# Patient Record
Sex: FEMALE | Race: BLACK OR AFRICAN AMERICAN | Employment: FULL TIME | URBAN - METROPOLITAN AREA
[De-identification: names, ages, dates, MRNs, and addresses within clinical notes are randomized per-mention and may not be internally consistent; named-entity substitution may affect disease eponyms.]

---

## 2017-01-31 ENCOUNTER — HOSPITAL ENCOUNTER (EMERGENCY)
Facility: CLINIC | Age: 27
Discharge: HOME | End: 2017-01-31
Attending: EMERGENCY MEDICINE | Admitting: EMERGENCY MEDICINE
Payer: COMMERCIAL

## 2017-01-31 ENCOUNTER — TELEPHONE (OUTPATIENT)
Dept: FAMILY MEDICINE | Facility: CLINIC | Age: 27
End: 2017-01-31

## 2017-01-31 ENCOUNTER — APPOINTMENT (OUTPATIENT)
Dept: CT IMAGING | Facility: CLINIC | Age: 27
End: 2017-01-31
Attending: EMERGENCY MEDICINE
Payer: COMMERCIAL

## 2017-01-31 VITALS
RESPIRATION RATE: 20 BRPM | TEMPERATURE: 98.6 F | OXYGEN SATURATION: 100 % | SYSTOLIC BLOOD PRESSURE: 131 MMHG | HEART RATE: 87 BPM | BODY MASS INDEX: 36.75 KG/M2 | DIASTOLIC BLOOD PRESSURE: 83 MMHG | WEIGHT: 201 LBS

## 2017-01-31 DIAGNOSIS — R10.84 ABDOMINAL PAIN, GENERALIZED: ICD-10-CM

## 2017-01-31 LAB
ALBUMIN SERPL-MCNC: 3.6 G/DL (ref 3.4–5)
ALBUMIN UR-MCNC: NEGATIVE MG/DL
ALP SERPL-CCNC: 91 U/L (ref 40–150)
ALT SERPL W P-5'-P-CCNC: 58 U/L (ref 0–50)
ANION GAP SERPL CALCULATED.3IONS-SCNC: 6 MMOL/L (ref 3–14)
APPEARANCE UR: CLEAR
AST SERPL W P-5'-P-CCNC: 20 U/L (ref 0–45)
BASOPHILS # BLD AUTO: 0 10E9/L (ref 0–0.2)
BASOPHILS NFR BLD AUTO: 0.3 %
BILIRUB SERPL-MCNC: 0.4 MG/DL (ref 0.2–1.3)
BILIRUB UR QL STRIP: NEGATIVE
BUN SERPL-MCNC: 9 MG/DL (ref 7–30)
CALCIUM SERPL-MCNC: 8.6 MG/DL (ref 8.5–10.1)
CHLORIDE SERPL-SCNC: 107 MMOL/L (ref 94–109)
CO2 SERPL-SCNC: 26 MMOL/L (ref 20–32)
COLOR UR AUTO: ABNORMAL
CREAT BLD-MCNC: 0.6 MG/DL (ref 0.52–1.04)
CREAT SERPL-MCNC: 0.66 MG/DL (ref 0.52–1.04)
DIFFERENTIAL METHOD BLD: NORMAL
EOSINOPHIL # BLD AUTO: 0.3 10E9/L (ref 0–0.7)
EOSINOPHIL NFR BLD AUTO: 2.8 %
ERYTHROCYTE [DISTWIDTH] IN BLOOD BY AUTOMATED COUNT: 13.1 % (ref 10–15)
GFR SERPL CREATININE-BSD FRML MDRD: >90 ML/MIN/1.7M2
GFR SERPL CREATININE-BSD FRML MDRD: ABNORMAL ML/MIN/1.7M2
GLUCOSE SERPL-MCNC: 80 MG/DL (ref 70–99)
GLUCOSE UR STRIP-MCNC: NEGATIVE MG/DL
HCG UR QL: NORMAL
HCT VFR BLD AUTO: 41.8 % (ref 35–47)
HGB BLD-MCNC: 14.3 G/DL (ref 11.7–15.7)
HGB UR QL STRIP: NEGATIVE
IMM GRANULOCYTES # BLD: 0 10E9/L (ref 0–0.4)
IMM GRANULOCYTES NFR BLD: 0.2 %
INTERNAL QC OK POCT: YES
KETONES UR STRIP-MCNC: NEGATIVE MG/DL
LEUKOCYTE ESTERASE UR QL STRIP: ABNORMAL
LIPASE SERPL-CCNC: 123 U/L (ref 73–393)
LYMPHOCYTES # BLD AUTO: 3.8 10E9/L (ref 0.8–5.3)
LYMPHOCYTES NFR BLD AUTO: 35.5 %
MCH RBC QN AUTO: 31.4 PG (ref 26.5–33)
MCHC RBC AUTO-ENTMCNC: 34.2 G/DL (ref 31.5–36.5)
MCV RBC AUTO: 92 FL (ref 78–100)
MONOCYTES # BLD AUTO: 0.4 10E9/L (ref 0–1.3)
MONOCYTES NFR BLD AUTO: 4.1 %
MUCOUS THREADS #/AREA URNS LPF: PRESENT /LPF
NEUTROPHILS # BLD AUTO: 6 10E9/L (ref 1.6–8.3)
NEUTROPHILS NFR BLD AUTO: 57.1 %
NITRATE UR QL: NEGATIVE
NRBC # BLD AUTO: 0 10*3/UL
NRBC BLD AUTO-RTO: 0 /100
PH UR STRIP: 6.5 PH (ref 5–7)
PLATELET # BLD AUTO: 253 10E9/L (ref 150–450)
POTASSIUM SERPL-SCNC: 3.6 MMOL/L (ref 3.4–5.3)
PROT SERPL-MCNC: 7.1 G/DL (ref 6.8–8.8)
RBC # BLD AUTO: 4.55 10E12/L (ref 3.8–5.2)
RBC #/AREA URNS AUTO: <1 /HPF (ref 0–2)
SODIUM SERPL-SCNC: 139 MMOL/L (ref 133–144)
SP GR UR STRIP: 1 (ref 1–1.03)
SQUAMOUS #/AREA URNS AUTO: 1 /HPF (ref 0–1)
URN SPEC COLLECT METH UR: ABNORMAL
UROBILINOGEN UR STRIP-MCNC: NORMAL MG/DL (ref 0–2)
WBC # BLD AUTO: 10.6 10E9/L (ref 4–11)
WBC #/AREA URNS AUTO: 1 /HPF (ref 0–2)

## 2017-01-31 PROCEDURE — 85025 COMPLETE CBC W/AUTO DIFF WBC: CPT | Performed by: EMERGENCY MEDICINE

## 2017-01-31 PROCEDURE — 99284 EMERGENCY DEPT VISIT MOD MDM: CPT | Mod: Z6 | Performed by: EMERGENCY MEDICINE

## 2017-01-31 PROCEDURE — 82565 ASSAY OF CREATININE: CPT

## 2017-01-31 PROCEDURE — 99285 EMERGENCY DEPT VISIT HI MDM: CPT | Mod: 25 | Performed by: EMERGENCY MEDICINE

## 2017-01-31 PROCEDURE — 25500064 ZZH RX 255 OP 636: Performed by: EMERGENCY MEDICINE

## 2017-01-31 PROCEDURE — 80053 COMPREHEN METABOLIC PANEL: CPT | Performed by: EMERGENCY MEDICINE

## 2017-01-31 PROCEDURE — 83690 ASSAY OF LIPASE: CPT | Performed by: EMERGENCY MEDICINE

## 2017-01-31 PROCEDURE — 25000125 ZZHC RX 250: Performed by: EMERGENCY MEDICINE

## 2017-01-31 PROCEDURE — 81001 URINALYSIS AUTO W/SCOPE: CPT | Performed by: EMERGENCY MEDICINE

## 2017-01-31 PROCEDURE — 81025 URINE PREGNANCY TEST: CPT | Performed by: EMERGENCY MEDICINE

## 2017-01-31 PROCEDURE — 74177 CT ABD & PELVIS W/CONTRAST: CPT

## 2017-01-31 RX ORDER — IOPAMIDOL 755 MG/ML
100 INJECTION, SOLUTION INTRAVASCULAR ONCE
Status: COMPLETED | OUTPATIENT
Start: 2017-01-31 | End: 2017-01-31

## 2017-01-31 RX ADMIN — IOPAMIDOL 100 ML: 755 INJECTION, SOLUTION INTRAVENOUS at 21:42

## 2017-01-31 RX ADMIN — SODIUM CHLORIDE 98 ML: 9 INJECTION, SOLUTION INTRAVENOUS at 21:42

## 2017-01-31 ASSESSMENT — ENCOUNTER SYMPTOMS
COLOR CHANGE: 0
ADENOPATHY: 0
AGITATION: 0
CONSTIPATION: 0
DIFFICULTY URINATING: 0
CHILLS: 0
NECK PAIN: 0
DIARRHEA: 0
FEVER: 0
BACK PAIN: 0
SHORTNESS OF BREATH: 0
ABDOMINAL PAIN: 1
NECK STIFFNESS: 0
BLOOD IN STOOL: 0
BRUISES/BLEEDS EASILY: 0
DYSURIA: 0
LIGHT-HEADEDNESS: 0
NAUSEA: 0
POLYDIPSIA: 0
VOMITING: 0

## 2017-01-31 NOTE — ED AVS SNAPSHOT
[facility], Emergency Department    [address]    [place] [address]    Phone:  [phone]    Fax:  [phone]                                       Michelle Joshua   MRN: [id number]    Department:  [facility], Emergency Department   Date of Visit:  1/31/2017           Patient Information     Date Of Birth          1990        Your diagnoses for this visit were:     Abdominal pain, generalized        You were seen by Cecilia Kenyon MD.        Discharge Instructions       Please make an appointment to follow up with Your Primary Care Provider in 2 days unless symptoms completely resolve.    *Abdominal Pain, Unknown Cause (Female)    The exact cause of your abdominal (stomach) pain is not certain. This does not mean that this is something to worry about, or the right tests were not done. Everyone likes to know the exact cause of the problem, but sometimes with abdominal pain, there is no clear-cut cause, and this could be a good thing. The good news is that your symptoms can be treated, and you will feel better.   Your condition does not seem serious now; however, sometimes the signs of a serious problem may take more time to appear. For this reason, it is important for you to watch for any new symptoms, problems, or worsening of your condition.  Over the next few days, the abdominal pain may come and go, or be continuous. Other common symptoms can include nausea and vomiting. Sometimes it can be difficult to tell if you feel nauseous, you may just feel bad and not associate that feeling with nausea. Constipation, diarrhea, and a fever may go along with the pain.  The pain may continue even if treated correctly over the following days. Depending on how things go, sometimes the cause can become clear and may require further or different treatment. Additional evaluations, medications, or tests may be needed.  Home care  Your health care provider may prescribe medications for pain, symptoms,  or an infection.  Follow the health care provider's instructions for taking these medications.  General care    Rest until your next exam. No strenuous activities.    Try to find positions that ease discomfort. A small pillow placed on the abdomen may help relieve pain.    Something warm on your abdomen (such as a heating pad) may help, but be careful not to burn yourself.  Diet    Do not force yourself to eat, especially if having cramps, vomiting, or diarrhea.    Water is important so you do not get dehydrated. Soup may also be good. Sports drinks may also help, especially if they are not too acidic. Make sure you don't drink sugary drinks as this can make things worse. Take liquids in small amounts. Do not guzzle them.    Caffeine sometimes makes the pain and cramping worse.    Avoid dairy products if you have vomiting or diarrhea.    Don't eat large amounts at a time. Wait a few minutes between bites.    Eat a diet low in fiber (called a low-residue diet). Foods allowed include refined breads, white rice, fruit and vegetable juices without pulp, tender meats. These foods will pass more easily through the intestine.    Avoid fried or fatty foods, dairy, alcohol and spicy foods until your symptoms go away.  Follow-up care  Follow up with your health care provider as instructed, or if your pain does not begin to improve in the next 24 hours.  When to seek medical care  Seek prompt medical care if any of the following occur:    Pain gets worse or moves to the right lower abdomen    New or worsening vomiting or diarrhea    Swelling of the abdomen    Unable to pass stool for more than three days    New fever over 101  F (38.3 C), or rising fever    Blood in vomit or bowel movements (dark red or black color)    Jaundice (yellow color of eyes and skin)    Weakness, dizziness    Chest, arm, back, neck or jaw pain    Unexpected vaginal bleeding or missed period  Call 911  Call emergency services if any of the following  occur:    Trouble breathing    Confusion    Fainting or loss of consciousness    Rapid heart rate    Seizure    1218-5689 [facility], [address] [facility], [place] [address]. All rights reserved. This information is not intended as a substitute for professional medical care. Always follow your healthcare professional's instructions.            Future Appointments        Provider Department Dept Phone Center    2/2/2017 1:20 PM Bimal Pearl PA-C [facility] [phone]       24 Hour Appointment Hotline       To make an appointment at any [facility], call [phone] ([phone]). If you don't have a family doctor or clinic, we will help you find one. [place] clinics are conveniently located to serve the needs of you and your family.             Review of your medicines      Our records show that you are taking the medicines listed below. If these are incorrect, please call your family doctor or clinic.        Dose / Directions Last dose taken    albuterol 108 (90 BASE) MCG/ACT Inhaler   Commonly known as:  PROAIR HFA/PROVENTIL HFA/VENTOLIN HFA   Dose:  2 puff   Quantity:  1 Inhaler        Inhale 2 puffs into the lungs every 6 hours as needed for shortness of breath / dyspnea   Refills:  5        azithromycin 250 MG tablet   Commonly known as:  ZITHROMAX   Quantity:  6 tablet        Two tablets first day, then one tablet daily for four days.   Refills:  0        cyclobenzaprine 5 MG tablet   Commonly known as:  FLEXERIL   Dose:  5 mg   Quantity:  42 tablet        Take 1 tablet (5 mg) by mouth 3 times daily as needed for muscle spasms   Refills:  1        docusate sodium 100 MG tablet   Commonly known as:  COLACE   Dose:  100 mg   Quantity:  60 tablet        Take 100 mg by mouth daily   Refills:  3        Flunisolide HFA 80 MCG/ACT Aers   Dose:  2 puff   Quantity:  1 Inhaler        Inhale 2 puffs into the lungs 2 times daily   Refills:  prn        guaiFENesin-codeine 100-10  MG/5ML Soln solution   Commonly known as:  ROBITUSSIN AC   Dose:  1 tsp.   Quantity:  120 mL        Take 5 mLs by mouth every 4 hours as needed for cough   Refills:  0        guaiFENesin-dextromethorphan 100-10 MG/5ML syrup   Commonly known as:  ROBITUSSIN DM   Dose:  5 mL   Quantity:  560 mL        Take 5 mLs by mouth every 4 hours as needed for cough   Refills:  0        loratadine 10 MG tablet   Commonly known as:  CLARITIN   Dose:  10 mg   Quantity:  30 tablet        Take 1 tablet (10 mg) by mouth daily   Refills:  3        order for DME   Quantity:  1 Device        Equipment being ordered: maternity support belt   Refills:  0        * oxyCODONE 5 MG IR tablet   Commonly known as:  ROXICODONE   Dose:  5-10 mg   Quantity:  10 tablet        Take 1-2 tablets (5-10 mg) by mouth every 4 hours as needed for moderate to severe pain   Refills:  0        * oxyCODONE 5 MG IR tablet   Commonly known as:  ROXICODONE   Dose:  5 mg   Quantity:  30 tablet        Take 1 tablet (5 mg) by mouth every 4 hours as needed for pain maximum 4 tablet(s) per day   Refills:  0        prenatal multivitamin  plus iron 27-0.8 MG Tabs per tablet   Dose:  1 tablet   Quantity:  100 tablet        Take 1 tablet by mouth daily   Refills:  3        promethazine 25 MG tablet   Commonly known as:  PHENERGAN   Dose:  25 mg   Quantity:  20 tablet        Take 1 tablet (25 mg) by mouth every 6 hours as needed for nausea   Refills:  1        * Notice:  This list has 2 medication(s) that are the same as other medications prescribed for you. Read the directions carefully, and ask your doctor or other care provider to review them with you.            Procedures and tests performed during your visit     CBC with platelets differential    CT Abdomen Pelvis w Contrast    Comprehensive metabolic panel    Creatinine POCT    ISTAT creatinine  POCT    Lipase    Peripheral IV catheter    UA with Microscopic    hCG qual urine POCT      Orders Needing Specimen  Collection     None      Pending Results     Date and Time Order Name Status Description    1/31/2017 2040 CT Abdomen Pelvis w Contrast Preliminary             Pending Culture Results     No orders found from 1/30/2017 to 2/1/2017.            Thank you for choosing [facility]       Thank you for choosing [facility] for your care. Our goal is always to provide you with excellent care. Hearing back from our patients is one way we can continue to improve our services. Please take a few minutes to complete the written survey that you may receive in the mail after you visit with us. Thank you!        [employer] Information     [employer] gives you secure access to your electronic health record. If you see a primary care provider, you can also send messages to your care team and make appointments. If you have questions, please call your primary care clinic.  If you do not have a primary care provider, please call [phone] and they will assist you.        Care EveryWhere ID     This is your Care EveryWhere ID. This could be used by other organizations to access your [facility] medical records  [id number]        After Visit Summary       This is your record. Keep this with you and show to your community pharmacist(s) and doctor(s) at your next visit.

## 2017-01-31 NOTE — ED AVS SNAPSHOT
[facility], [place], Emergency Department    [address]    [facility] [address]    Phone:  [phone]    Fax:  [phone]                                       Michelle Joshua   MRN: [id number]    Department:  [facility], Emergency Department   Date of Visit:  1/31/2017           After Visit Summary Signature Page     I have received my discharge instructions, and my questions have been answered. I have discussed any challenges I see with this plan with the nurse or doctor.    ..........................................................................................................................................  Patient/Patient Representative Signature      ..........................................................................................................................................  Patient Representative Print Name and Relationship to Patient    ..................................................               ................................................  Date                                            Time    ..........................................................................................................................................  Reviewed by Signature/Title    ...................................................              ..............................................  Date                                                            Time

## 2017-02-01 NOTE — TELEPHONE ENCOUNTER
Disc w/ pt-hard to know what it is so prob UC tonight best bet-call and cancel if she doesn't need the thurs appt

## 2017-02-01 NOTE — TELEPHONE ENCOUNTER
Reason for call:  Patient reporting a symptom    Symptom or request: Abd pain    Duration (how long have symptoms been present): since last night    Have you been treated for this before? Yes    Additional comments: upset because there was no nurse avail to take call right now.. Wondering if she needed to go to     Phone Number patient can be reached at:  Home number on file [phone] (home)    Best Time:  anytime    Can we leave a detailed message on this number:  YES    Call taken on 1/31/2017 at 6:01 PM by Savita Bonilla

## 2017-02-01 NOTE — ED NOTES
Pt says she has a lum\p above belly button and c/o very sharp pain.  Bowel movements have been normal.  Eating has been fine too.  No nausea or vomiting.

## 2017-02-01 NOTE — TELEPHONE ENCOUNTER
MP,  Patient called c/o abd pain.  Pain is to (R) side of abd right next to umbilicus.  Hurts to put pressure on area, lay on stomach, sit down (pants press on this area).  No recent diarrhea or constipation issues. No new foods or medications.  Nauseous; has not vomited.  Afebrile.  Has not tried OTC pain relievers.  Feels a small lump to this area.  Has appt with  Thursday; works until 4pm tomorrow can only come in after that.  Pt wanting to know if she should go to [facility] or if she can wait to see  Thursday.  Please advise.  Yolanda SANDOVAL RN

## 2017-02-01 NOTE — ED PROVIDER NOTES
History     Chief Complaint   Patient presents with     Abdominal Pain     suspects a hernia as feels a not right above umbilicus. can't lay on stomach, walking is painful.     HPI  Michelle Joshua is a 26 year old female complaining of 2 days of midabdominal pain. Pain is dull, occasionally radiating to the left side, mild to moderate, nothing makes it better or worse. No fevers/chills, nausea, vomiting, diarrhea, or previous abdominal surgeries. No chronic medical problems according to the patient. No dysuria. No other concerns or complaints.    This part of the document was transcribed by Sunny Reid, Medical Scribe.     I have reviewed the Medications, Allergies, Past Medical and Surgical History, and Social History in the Epic system.    Review of Systems   Constitutional: Negative for fever and chills.   HENT: Negative for congestion.    Eyes: Negative for visual disturbance.   Respiratory: Negative for shortness of breath.    Cardiovascular: Negative for chest pain.   Gastrointestinal: Positive for abdominal pain. Negative for nausea, vomiting, diarrhea, constipation and blood in stool.   Endocrine: Negative for polydipsia and polyuria.   Genitourinary: Negative for dysuria and difficulty urinating.   Musculoskeletal: Negative for back pain, neck pain and neck stiffness.   Skin: Negative for color change.   Neurological: Negative for light-headedness.   Hematological: Negative for adenopathy. Does not bruise/bleed easily.   Psychiatric/Behavioral: Negative for behavioral problems and agitation.       Physical Exam   BP: 155/70 mmHg  Pulse: 87  Temp: 98.2  F (36.8  C)  Resp: 16  Weight: 91.173 kg (201 lb)  SpO2: 100 %  Physical Exam   Constitutional: She is oriented to person, place, and time. She appears well-developed and well-nourished. No distress.   HENT:   Head: Normocephalic and atraumatic.   Mouth/Throat: Oropharynx is clear and moist. No oropharyngeal exudate.   Eyes: Conjunctivae and EOM are  normal. No scleral icterus.   Neck: Normal range of motion.   Cardiovascular: Normal rate, normal heart sounds and intact distal pulses.    Pulmonary/Chest: Effort normal and breath sounds normal. No respiratory distress. She has no wheezes. She has no rales.   Abdominal: Soft. Bowel sounds are normal. She exhibits no distension. There is tenderness. There is no rebound, no guarding, no CVA tenderness, no tenderness at McBurney's point and negative Joe's sign.   Midabdominal tenderness to palpation without any obvious mass.   Musculoskeletal: Normal range of motion. She exhibits no edema or tenderness.   Neurological: She is alert and oriented to person, place, and time. No cranial nerve deficit. She exhibits normal muscle tone. Coordination normal.   Skin: Skin is warm. No rash noted. She is not diaphoretic.   Psychiatric: She has a normal mood and affect. Her behavior is normal. Judgment and thought content normal.   Nursing note and vitals reviewed.      ED Course     Procedures             Critical Care time:  none               Labs Ordered and Resulted from Time of ED Arrival Up to the Time of Departure from the ED   COMPREHENSIVE METABOLIC PANEL - Abnormal; Notable for the following:     ALT 58 (*)     All other components within normal limits   ROUTINE UA WITH MICROSCOPIC - Abnormal; Notable for the following:     Leukocyte Esterase Urine Small (*)     Mucous Urine Present (*)     All other components within normal limits   CBC WITH PLATELETS DIFFERENTIAL   LIPASE   CREATININE POCT   PERIPHERAL IV CATHETER   ISTAT CREATININE NURSING POCT       Assessments & Plan (with Medical Decision Making)   26-year-old presenting with abdominal pain. Differential diagnosis: Acute pancreatitis, acute cholecystitis, acute appendicitis, unlikely SBO or volvulus, unlikely kidney stone, hernia.    After thorough history and physical exam the patient appears to be in no acute distress. I will obtain laboratory studies and a  CT abdomen/pelvis for further diagnostic evaluation. Patient agrees with the plan.    Patient's laboratory studies returned without any evidence of leukocytosis, WBC is normal at 10,600. There is no evidence of anemia, hemoglobin is normal at 14.3.  Electrolytes show no evidence of dehydration, creatinine is normal at 0.66.  ALT slightly elevated at 58, however, the rest of the LFTs are normal. Lipase is normal. UA shows no evidence of infection. Patient is not pregnant. I reviewed her CT abdomen/pelvis and I read the radiology report; there is no evidence of intra-abdominal infection, obstruction, or any etiology to explain her pain. On reexamination her abdomen, the abdomen is soft, nontender, nondistended. At this point I do not believe that she needs any further emergency department workup. I recommended follow up with her primary care physician for further evaluation. She agrees with this plan. She will return if her symptoms return.      This part of the document was transcribed by Sunny Reid Medical Scribe.     I have reviewed the nursing notes.    I have reviewed the findings, diagnosis, plan and need for follow up with the patient.    Discharge Medication List as of 1/31/2017 10:06 PM          Final diagnoses:   Abdominal pain, generalized     ISunny, am serving as a trained medical scribe to document services personally performed by Cecilia Kenyon MD, based on the provider's statements to me.   Cecilia MUÑOZ MD, was physically present and have reviewed and verified the accuracy of this note documented by Sunny Reid.  1/31/2017   [facility], [place], EMERGENCY DEPARTMENT      Cecilia Kenyon MD  01/31/17 6554

## 2017-02-01 NOTE — DISCHARGE INSTRUCTIONS
Please make an appointment to follow up with Your Primary Care Provider in 2 days unless symptoms completely resolve.    *Abdominal Pain, Unknown Cause (Female)    The exact cause of your abdominal (stomach) pain is not certain. This does not mean that this is something to worry about, or the right tests were not done. Everyone likes to know the exact cause of the problem, but sometimes with abdominal pain, there is no clear-cut cause, and this could be a good thing. The good news is that your symptoms can be treated, and you will feel better.   Your condition does not seem serious now; however, sometimes the signs of a serious problem may take more time to appear. For this reason, it is important for you to watch for any new symptoms, problems, or worsening of your condition.  Over the next few days, the abdominal pain may come and go, or be continuous. Other common symptoms can include nausea and vomiting. Sometimes it can be difficult to tell if you feel nauseous, you may just feel bad and not associate that feeling with nausea. Constipation, diarrhea, and a fever may go along with the pain.  The pain may continue even if treated correctly over the following days. Depending on how things go, sometimes the cause can become clear and may require further or different treatment. Additional evaluations, medications, or tests may be needed.  Home care  Your health care provider may prescribe medications for pain, symptoms, or an infection.  Follow the health care provider's instructions for taking these medications.  General care    Rest until your next exam. No strenuous activities.    Try to find positions that ease discomfort. A small pillow placed on the abdomen may help relieve pain.    Something warm on your abdomen (such as a heating pad) may help, but be careful not to burn yourself.  Diet    Do not force yourself to eat, especially if having cramps, vomiting, or diarrhea.    Water is important so you do not  get dehydrated. Soup may also be good. Sports drinks may also help, especially if they are not too acidic. Make sure you don't drink sugary drinks as this can make things worse. Take liquids in small amounts. Do not guzzle them.    Caffeine sometimes makes the pain and cramping worse.    Avoid dairy products if you have vomiting or diarrhea.    Don't eat large amounts at a time. Wait a few minutes between bites.    Eat a diet low in fiber (called a low-residue diet). Foods allowed include refined breads, white rice, fruit and vegetable juices without pulp, tender meats. These foods will pass more easily through the intestine.    Avoid fried or fatty foods, dairy, alcohol and spicy foods until your symptoms go away.  Follow-up care  Follow up with your health care provider as instructed, or if your pain does not begin to improve in the next 24 hours.  When to seek medical care  Seek prompt medical care if any of the following occur:    Pain gets worse or moves to the right lower abdomen    New or worsening vomiting or diarrhea    Swelling of the abdomen    Unable to pass stool for more than three days    New fever over 101  F (38.3 C), or rising fever    Blood in vomit or bowel movements (dark red or black color)    Jaundice (yellow color of eyes and skin)    Weakness, dizziness    Chest, arm, back, neck or jaw pain    Unexpected vaginal bleeding or missed period  Call 911  Call emergency services if any of the following occur:    Trouble breathing    Confusion    Fainting or loss of consciousness    Rapid heart rate    Seizure    1090-5811 [facility], [address] [place] [address], [place] [address]. All rights reserved. This information is not intended as a substitute for professional medical care. Always follow your healthcare professional's instructions.

## 2017-02-02 ENCOUNTER — OFFICE VISIT (OUTPATIENT)
Dept: FAMILY MEDICINE | Facility: CLINIC | Age: 27
End: 2017-02-02
Payer: COMMERCIAL

## 2017-02-02 VITALS
HEART RATE: 82 BPM | OXYGEN SATURATION: 100 % | BODY MASS INDEX: 36.95 KG/M2 | RESPIRATION RATE: 17 BRPM | TEMPERATURE: 98.4 F | HEIGHT: 62 IN | WEIGHT: 200.8 LBS | DIASTOLIC BLOOD PRESSURE: 76 MMHG | SYSTOLIC BLOOD PRESSURE: 126 MMHG

## 2017-02-02 DIAGNOSIS — J45.20 MILD INTERMITTENT ASTHMA WITHOUT COMPLICATION: Primary | ICD-10-CM

## 2017-02-02 DIAGNOSIS — R10.84 ABDOMINAL PAIN, GENERALIZED: ICD-10-CM

## 2017-02-02 PROCEDURE — 99213 OFFICE O/P EST LOW 20 MIN: CPT | Performed by: PHYSICIAN ASSISTANT

## 2017-02-02 RX ORDER — HYDROCODONE BITARTRATE AND ACETAMINOPHEN 5; 325 MG/1; MG/1
1 TABLET ORAL EVERY 8 HOURS PRN
Qty: 18 TABLET | Refills: 0 | Status: SHIPPED | OUTPATIENT
Start: 2017-02-02 | End: 2017-02-12

## 2017-02-02 RX ORDER — ALBUTEROL SULFATE 90 UG/1
2 AEROSOL, METERED RESPIRATORY (INHALATION) EVERY 6 HOURS PRN
Qty: 1 INHALER | Refills: 1 | Status: SHIPPED | OUTPATIENT
Start: 2017-02-02 | End: 2017-08-11

## 2017-02-02 NOTE — MR AVS SNAPSHOT
"              After Visit Summary   2/2/2017    Michelle Joshua    MRN: [id number]           Patient Information     Date Of Birth          1990        Visit Information        Provider Department      2/2/2017 1:20 PM Bimal Pearl PA-C [facility]        Today's Diagnoses     Mild intermittent asthma without complication    -  1     Abdominal pain, generalized            Follow-ups after your visit        Who to contact     If you have questions or need follow up information about today's clinic visit or your schedule please contact [facility] directly at [phone].  Normal or non-critical lab and imaging results will be communicated to you by Micro Inter[employer] Deviceshart, letter or phone within 4 business days after the clinic has received the results. If you do not hear from us within 7 days, please contact the clinic through [employer]hart or phone. If you have a critical or abnormal lab result, we will notify you by phone as soon as possible.  Submit refill requests through [employer] or call your pharmacy and they will forward the refill request to us. Please allow 3 business days for your refill to be completed.          Additional Information About Your Visit        MyChart Information     [employer] gives you secure access to your electronic health record. If you see a primary care provider, you can also send messages to your care team and make appointments. If you have questions, please call your primary care clinic.  If you do not have a primary care provider, please call [phone] and they will assist you.        Care EveryWhere ID     This is your Care EveryWhere ID. This could be used by other organizations to access your [place] medical records  [id number]        Your Vitals Were     Pulse Temperature Respirations    82 98.4  F (36.9  C) (Oral) 17    Height BMI (Body Mass Index) Pulse Oximetry    5' 2\" (1.575 m) 36.72 kg/m2 100%    Last Period Breastfeeding?       02/01/2016 " (Approximate) No        Blood Pressure from Last 3 Encounters:   02/02/17 126/76   01/31/17 131/83   11/17/16 112/73    Weight from Last 3 Encounters:   02/02/17 200 lb 12.8 oz (91.082 kg)   01/31/17 201 lb (91.173 kg)   11/17/16 203 lb (92.08 kg)              Today, you had the following     No orders found for display         Today's Medication Changes          These changes are accurate as of: 2/2/17  1:47 PM.  If you have any questions, ask your nurse or doctor.               Start taking these medicines.        Dose/Directions    albuterol 108 (90 BASE) MCG/ACT Inhaler   Commonly known as:  PROAIR HFA/PROVENTIL HFA/VENTOLIN HFA   Used for:  Mild intermittent asthma without complication   Started by:  Bimal Pearl PA-C        Dose:  2 puff   Inhale 2 puffs into the lungs every 6 hours as needed for shortness of breath / dyspnea or wheezing   Quantity:  1 Inhaler   Refills:  1       HYDROcodone-acetaminophen 5-325 MG per tablet   Commonly known as:  NORCO   Used for:  Abdominal pain, generalized   Started by:  Bimal Pearl PA-C        Dose:  1 tablet   Take 1 tablet by mouth every 8 hours as needed for pain maximum 3 tablet(s) per day   Quantity:  18 tablet   Refills:  0            Where to get your medicines      These medications were sent to [id number] - [place], [place] - [address] AT [facility] [address]  [address] [facility] [address]     Phone:  [phone]    - albuterol 108 (90 BASE) MCG/ACT Inhaler      Some of these will need a paper prescription and others can be bought over the counter.  Ask your nurse if you have questions.     Bring a paper prescription for each of these medications    - HYDROcodone-acetaminophen 5-325 MG per tablet             Primary Care Provider Office Phone # Fax #    Nam Prabhakar [phone]       [facility] [address] [address]        Thank you!      Thank you for choosing [facility]  for your care. Our goal is always to provide you with excellent care. Hearing back from our patients is one way we can continue to improve our services. Please take a few minutes to complete the written survey that you may receive in the mail after your visit with us. Thank you!             Your Updated Medication List - Protect others around you: Learn how to safely use, store and throw away your medicines at www.disposemymeds.org.          This list is accurate as of: 2/2/17  1:47 PM.  Always use your most recent med list.                   Brand Name Dispense Instructions for use    albuterol 108 (90 BASE) MCG/ACT Inhaler    PROAIR HFA/PROVENTIL HFA/VENTOLIN HFA    1 Inhaler    Inhale 2 puffs into the lungs every 6 hours as needed for shortness of breath / dyspnea or wheezing       HYDROcodone-acetaminophen 5-325 MG per tablet    NORCO    18 tablet    Take 1 tablet by mouth every 8 hours as needed for pain maximum 3 tablet(s) per day

## 2017-02-02 NOTE — NURSING NOTE
"Chief Complaint   Patient presents with     Hospital F/U     [facility]- 1/31/17- Abdominal Pain     /76 mmHg  Pulse 82  Temp(Src) 98.4  F (36.9  C) (Oral)  Resp 17  Ht 5' 2\" (1.575 m)  Wt 200 lb 12.8 oz (91.082 kg)  BMI 36.72 kg/m2  SpO2 100%  LMP 02/01/2016 (Approximate)  Breastfeeding? No Estimated body mass index is 36.72 kg/(m^2) as calculated from the following:    Height as of this encounter: 5' 2\" (1.575 m).    Weight as of this encounter: 200 lb 12.8 oz (91.082 kg).  bp completed using cuff size: large      left    Health Maintenance Due Pending Provider Review:  GAD7    Possibly completing today per provider review.    Slime Vinson MA  [facility]    "

## 2017-02-02 NOTE — PROGRESS NOTES
"  SUBJECTIVE:                                                    Michelle Joshua is a 26 year old female who presents to clinic today for the following health issues:      ED/UC Followup:    Facility:  [facility]  Date of visit: 1/31/17  Reason for visit: Abdominal Pain  Current Status: not improving     Patient had baby in November 2016.  Would like to restart her Depo today if possible, but she is still having a lot of pain.        Problem list and histories reviewed & adjusted, as indicated.  Additional history: she had the start of this when she was sick a few weeks back.  Thought was just due to coughing and muscle related.  Cough has gone away.  Pain does continue.  Pain only with movement.  Pain is directly over belly button and can radiate from there.  No bowel symptoms.  Food does not make any change.  Pain really only with movement.   No urinary symptoms.      She did get evaluated at ER 1/31/17 with normal CT scan and labs.      Problem list, Medication list, Allergies, and Medical/Social/Surgical histories reviewed in EPIC and updated as appropriate.    ROS:  Constitutional, HEENT, cardiovascular, pulmonary, gi and gu systems are negative, except as otherwise noted.    OBJECTIVE:                                                    /76 mmHg  Pulse 82  Temp(Src) 98.4  F (36.9  C) (Oral)  Resp 17  Ht 5' 2\" (1.575 m)  Wt 200 lb 12.8 oz (91.082 kg)  BMI 36.72 kg/m2  SpO2 100%  LMP 02/01/2016 (Approximate)  Breastfeeding? No  Body mass index is 36.72 kg/(m^2).  GENERAL: alert and no distress  EYES: Eyes grossly normal to inspection  RESP: lungs clear to auscultation - no rales, rhonchi or wheezes  CV: regular rate and rhythm, normal S1 S2, no S3 or S4, no murmur, click or rub, no peripheral edema and peripheral pulses strong  ABDOMEN: normal bowel sounds.  No masses.  Tender just right of umbilicus.  No hernia appreciated, but difficult exam due to body habitus and pain    Diagnostic " Test Results:  none      ASSESSMENT/PLAN:                                                            1. Mild intermittent asthma without complication    - albuterol (PROAIR HFA/PROVENTIL HFA/VENTOLIN HFA) 108 (90 BASE) MCG/ACT Inhaler; Inhale 2 puffs into the lungs every 6 hours as needed for shortness of breath / dyspnea or wheezing  Dispense: 1 Inhaler; Refill: 1    2. Abdominal pain, generalized  Story does fit with hernia, but none noted on CT scan.  Will treat pain over the weekend, and see how symptoms progress.  No exam finding consistent with acute abdomen.  - HYDROcodone-acetaminophen (NORCO) 5-325 MG per tablet; Take 1 tablet by mouth every 8 hours as needed for pain maximum 3 tablet(s) per day  Dispense: 18 tablet; Refill: 0        Bimal Pearl PA-C  [facility]

## 2017-02-03 ENCOUNTER — TELEPHONE (OUTPATIENT)
Dept: FAMILY MEDICINE | Facility: CLINIC | Age: 27
End: 2017-02-03

## 2017-02-03 NOTE — TELEPHONE ENCOUNTER
Reason for Call:  Request for results:    Name of test or procedure: CT Abdomen Pelvis w Contrast (Order #[id number]) on 1/31/2017 - Order Result History Report    Date of test of procedure: 1/30/17    Location of the test or procedure: [facility]    OK to leave the result message on voice mail or with a family member? YES    Phone number Patient can be reached at:  Cell number on file:    Telephone Information:   Mobile [phone]       Additional comments: Pt said she wants to speak with Dr. Prabhakar regarding results    Call taken on 2/3/2017 at 2:09 PM by Luisa Gonzalez

## 2017-02-06 NOTE — TELEPHONE ENCOUNTER
MP,  Per below message, pt requesting CT results from ER visit.  Please advise.  Yolanda SANDOVAL RN

## 2017-02-07 NOTE — TELEPHONE ENCOUNTER
With CT being very normal, I cannot fully explain her pain.  I would try OTC zantax twice a day for a few days to see if this helps the pain.      Emmanuel Pearl PA-C

## 2017-02-07 NOTE — TELEPHONE ENCOUNTER
"JS  Spoke with pt, let her know CT was normal  She was wondering what you would recommend her next steps to be.  Says she is still having pain and \"my stomach is in a knot\"  Thank you,  Jmeima Griggs RN    "

## 2017-02-08 ENCOUNTER — TELEPHONE (OUTPATIENT)
Dept: FAMILY MEDICINE | Facility: CLINIC | Age: 27
End: 2017-02-08

## 2017-02-08 NOTE — TELEPHONE ENCOUNTER
Spoke with pt, says she received her last depo on 11/9/16 IP, however chart says last was done on 11/11/16  Did schedule pt for today, at 4:15 pm, so she doesn't get off track.  Jemima Griggs, RN

## 2017-02-08 NOTE — TELEPHONE ENCOUNTER
Reason for Call:  Other call back    Detailed comments: Thew patient is due for her next Depo shot and she wants to know the amount time  She has between shots to still be covered by her BC  Pt Scheduled 2/14 which is 3 months and 5 days  Please call patient and let her know how much time she has in between shots to be safe      Phone Number Patient can be reached at: Home number on file [phone] (home)    Best Time: anytime    Can we leave a detailed message on this number? YES    Call taken on 2/8/2017 at 1:25 PM by Alexandra Chau

## 2017-02-14 ENCOUNTER — ALLIED HEALTH/NURSE VISIT (OUTPATIENT)
Dept: NURSING | Facility: CLINIC | Age: 27
End: 2017-02-14
Payer: COMMERCIAL

## 2017-02-14 DIAGNOSIS — Z30.42 DEPO-PROVERA CONTRACEPTIVE STATUS: Primary | ICD-10-CM

## 2017-02-14 DIAGNOSIS — Z30.013 ENCOUNTER FOR INITIAL PRESCRIPTION OF INJECTABLE CONTRACEPTIVE: ICD-10-CM

## 2017-02-14 LAB — BETA HCG QUAL IFA URINE: NEGATIVE

## 2017-02-14 PROCEDURE — 84703 CHORIONIC GONADOTROPIN ASSAY: CPT | Performed by: FAMILY MEDICINE

## 2017-02-14 PROCEDURE — 99207 ZZC NO CHARGE NURSE ONLY: CPT

## 2017-02-14 PROCEDURE — 96372 THER/PROPH/DIAG INJ SC/IM: CPT

## 2017-02-14 RX ORDER — MEDROXYPROGESTERONE ACETATE 150 MG/ML
150 INJECTION, SUSPENSION INTRAMUSCULAR
Qty: 1 ML | Refills: 1 | OUTPATIENT
Start: 2017-02-14 | End: 2020-10-15

## 2017-02-14 NOTE — MR AVS SNAPSHOT
After Visit Summary   2/14/2017    Michelle Joshua    MRN: [id number]           Patient Information     Date Of Birth          1990        Visit Information        Provider Department      2/14/2017 4:30 PM UP NURSE [facility]        Today's Diagnoses     Depo-Provera contraceptive status    -  1    Encounter for initial prescription of injectable contraceptive           Follow-ups after your visit        Who to contact     If you have questions or need follow up information about today's clinic visit or your schedule please contact [facility] directly at [phone].  Normal or non-critical lab and imaging results will be communicated to you by LinkMeGlobalhart, letter or phone within 4 business days after the clinic has received the results. If you do not hear from us within 7 days, please contact the clinic through Perfect Pizzat or phone. If you have a critical or abnormal lab result, we will notify you by phone as soon as possible.  Submit refill requests through [employer] or call your pharmacy and they will forward the refill request to us. Please allow 3 business days for your refill to be completed.          Additional Information About Your Visit        MyChart Information     [employer] gives you secure access to your electronic health record. If you see a primary care provider, you can also send messages to your care team and make appointments. If you have questions, please call your primary care clinic.  If you do not have a primary care provider, please call [phone] and they will assist you.        Care EveryWhere ID     This is your Care EveryWhere ID. This could be used by other organizations to access your [facility] medical records  [id number]         Blood Pressure from Last 3 Encounters:   02/02/17 126/76   01/31/17 131/83   11/17/16 112/73    Weight from Last 3 Encounters:   02/02/17 200 lb 12.8 oz (91.1 kg)   01/31/17 201 lb (91.2 kg)   11/17/16 203 lb (92.1 kg)               We Performed the Following     Beta HCG qual IFA urine          Today's Medication Changes          These changes are accurate as of: 2/14/17  5:03 PM.  If you have any questions, ask your nurse or doctor.               Start taking these medicines.        Dose/Directions    medroxyPROGESTERone 150 MG/ML injection   Commonly known as:  DEPO-PROVERA   Used for:  Depo-Provera contraceptive status, Encounter for initial prescription of injectable contraceptive        Dose:  150 mg   Inject 1 mL (150 mg) into the muscle every 3 months   Quantity:  1 mL   Refills:  1            Where to get your medicines      Some of these will need a paper prescription and others can be bought over the counter.  Ask your nurse if you have questions.     You don't need a prescription for these medications     medroxyPROGESTERone 150 MG/ML injection                Primary Care Provider Office Phone # Fax #    Nam Prabhakar [phone]       [facility] [address] [address]        Thank you!     Thank you for choosing [facility]  for your care. Our goal is always to provide you with excellent care. Hearing back from our patients is one way we can continue to improve our services. Please take a few minutes to complete the written survey that you may receive in the mail after your visit with us. Thank you!             Your Updated Medication List - Protect others around you: Learn how to safely use, store and throw away your medicines at www.disposemymeds.org.          This list is accurate as of: 2/14/17  5:03 PM.  Always use your most recent med list.                   Brand Name Dispense Instructions for use    albuterol 108 (90 BASE) MCG/ACT Inhaler    PROAIR HFA/PROVENTIL HFA/VENTOLIN HFA    1 Inhaler    Inhale 2 puffs into the lungs every 6 hours as needed for shortness of breath / dyspnea or wheezing       medroxyPROGESTERone 150 MG/ML injection     DEPO-PROVERA    1 mL    Inject 1 mL (150 mg) into the muscle every 3 months

## 2017-03-13 ENCOUNTER — TELEPHONE (OUTPATIENT)
Dept: FAMILY MEDICINE | Facility: CLINIC | Age: 27
End: 2017-03-13

## 2017-03-13 NOTE — TELEPHONE ENCOUNTER
Pt states she discussed with  how her arm has been bothering her while her son was in for his appointment. She states  told her to get in right way to see him. No availability with  this week. She is requesting to be fit in to see him ASAP please call Michelle to advise. OK to LM. Thank You.    Thi Stringer Scheduling

## 2017-03-14 ENCOUNTER — OFFICE VISIT (OUTPATIENT)
Dept: FAMILY MEDICINE | Facility: CLINIC | Age: 27
End: 2017-03-14
Payer: COMMERCIAL

## 2017-03-14 VITALS
SYSTOLIC BLOOD PRESSURE: 120 MMHG | HEART RATE: 91 BPM | OXYGEN SATURATION: 99 % | RESPIRATION RATE: 16 BRPM | WEIGHT: 206 LBS | TEMPERATURE: 98.5 F | BODY MASS INDEX: 37.91 KG/M2 | DIASTOLIC BLOOD PRESSURE: 80 MMHG | HEIGHT: 62 IN

## 2017-03-14 DIAGNOSIS — M75.42 IMPINGEMENT SYNDROME OF LEFT SHOULDER: Primary | ICD-10-CM

## 2017-03-14 PROCEDURE — 99213 OFFICE O/P EST LOW 20 MIN: CPT | Performed by: FAMILY MEDICINE

## 2017-03-14 NOTE — TELEPHONE ENCOUNTER
Spoke with patient.  Scheduled patient to see MP today at 5:30 (had cancellation).  Kiha Weber RN

## 2017-03-14 NOTE — MR AVS SNAPSHOT
After Visit Summary   3/14/2017    Michelle Joshua    MRN: [id number]           Patient Information     Date Of Birth          1990        Visit Information        Provider Department      3/14/2017 5:30 PM Nam Prabhakar MD [facility]        Today's Diagnoses     Impingement syndrome of left shoulder    -  1       Follow-ups after your visit        Additional Services     [facility] PT, HAND, AND CHIROPRACTIC REFERRAL       === This order will print in the [facility] Scheduling  Office ===    Physical therapy, hand therapy and chiropractic care are available through:    [facility]  [facility]    Call one easy number to schedule at any of the above locations:  [phone].    Your provider has referred you to Physical Therapy at [facility] or [facility]    Indication/Reason for Referral: Shoulder Pain  Onset of Illness:  chronic  Therapy Orders:  Evaluate and Treat  Special Programs:    Special Request:      Additional Comments for the therapist or chiropractor:      Please be aware that coverage of these services is subject to the terms and limitations of your health insurance plan.  Call member services at your health plan with any benefit or coverage questions.      Please bring the following to your appointment:    >>   Your personal calendar for scheduling future appointments  >>   Comfortable clothing                  Who to contact     If you have questions or need follow up information about today's clinic visit or your schedule please contact [facility] directly at [phone].  Normal or non-critical lab and imaging results will be communicated to you by MyChart, letter or phone within 4 business days after the clinic has received the results. If you do not hear from us within 7 days, please contact the clinic through MyChart or phone. If you have a critical or abnormal lab result, we will notify you by phone as  "soon as possible.  Submit refill requests through FanSnap or call your pharmacy and they will forward the refill request to us. Please allow 3 business days for your refill to be completed.          Additional Information About Your Visit        RigUphar[employer]osphere Information     [employer] gives you secure access to your electronic health record. If you see a primary care provider, you can also send messages to your care team and make appointments. If you have questions, please call your primary care clinic.  If you do not have a primary care provider, please call [phone] and they will assist you.        Care EveryWhere ID     This is your Care EveryWhere ID. This could be used by other organizations to access your [facility] medical records  [id number]        Your Vitals Were     Pulse Temperature Respirations Height Pulse Oximetry BMI (Body Mass Index)    91 98.5  F (36.9  C) (Oral) 16 5' 2\" (1.575 m) 99% 37.68 kg/m2       Blood Pressure from Last 3 Encounters:   03/14/17 120/80   02/02/17 126/76   01/31/17 131/83    Weight from Last 3 Encounters:   03/14/17 206 lb (93.4 kg)   02/02/17 200 lb 12.8 oz (91.1 kg)   01/31/17 201 lb (91.2 kg)              We Performed the Following     [facility] PT, HAND, AND CHIROPRACTIC REFERRAL        Primary Care Provider Office Phone # Fax #    Nam Prabhakar [phone]       [facility] [address] [address]        Thank you!     Thank you for choosing [facility]  for your care. Our goal is always to provide you with excellent care. Hearing back from our patients is one way we can continue to improve our services. Please take a few minutes to complete the written survey that you may receive in the mail after your visit with us. Thank you!             Your Updated Medication List - Protect others around you: Learn how to safely use, store and throw away your medicines at www.disposemymeds.org.          This list is accurate " as of: 3/14/17  7:04 PM.  Always use your most recent med list.                   Brand Name Dispense Instructions for use    albuterol 108 (90 BASE) MCG/ACT Inhaler    PROAIR HFA/PROVENTIL HFA/VENTOLIN HFA    1 Inhaler    Inhale 2 puffs into the lungs every 6 hours as needed for shortness of breath / dyspnea or wheezing       medroxyPROGESTERone 150 MG/ML injection    DEPO-PROVERA    1 mL    Inject 1 mL (150 mg) into the muscle every 3 months

## 2017-03-14 NOTE — LETTER
My Asthma Action Plan  Name: Michelle Joshua   YOB: 1990  Date: 3/14/2017   My doctor: Nam Prabhakar MD   My clinic: [facility]        My Control Medicine: { :959147}  My Rescue Medicine: { :550561}  {AAP include Oral Steroid:992191} My Asthma Severity: { :357284}  Avoid your asthma triggers: { :718571}        {Is patient a child or adult?:581235:::0}       GREEN ZONE     Good Control    I feel good    No cough or wheeze    Can work, sleep and play without asthma symptoms       Take your asthma control medicine every day.     1. If exercise triggers your asthma, take your rescue medication    15 minutes before exercise or sports, and    During exercise if you have asthma symptoms  2. Spacer to use with inhaler: If you have a spacer, make sure to use it with your inhaler             YELLOW ZONE     Getting Worse  I have ANY of these:    I do not feel good    Cough or wheeze    Chest feels tight    Wake up at night   1. Keep taking your Green Zone medications  2. Start taking your rescue medicine:    every 20 minutes for up to 1 hour. Then every 4 hours for 24-48 hours.  3. If you stay in the Yellow Zone for more than 12-24 hours, contact your doctor.  4. If you do not return to the Green Zone in 12-24 hours or you get worse, start taking your oral steroid medicine if prescribed by your provider.           RED ZONE     Medical Alert - Get Help  I have ANY of these:    I feel awful    Medicine is not helping    Breathing getting harder    Trouble walking or talking    Nose opens wide to breathe       1. Take your rescue medicine NOW  2. If your provider has prescribed an oral steroid medicine, start taking it NOW  3. Call your doctor NOW  4. If you are still in the Red Zone after 20 minutes and you have not reached your doctor:    Take your rescue medicine again and    Call 911 or go to the emergency room right away    See your regular doctor within 2 weeks of an Emergency Room or  Urgent Care visit for follow-up treatment.        Electronically signed by: Ravinder Alexis, March 14, 2017    Annual Reminders:  Meet with Asthma Educator,  Flu Shot in the Fall, consider Pneumonia Vaccination for patients with asthma (aged 19 and older).    Pharmacy:    [id number] - [place], [place] - [address] AT [address] & [facility] - [place]  [facility] [place], [place] - [address] AT [address] [place] [address]                    Asthma Triggers  How To Control Things That Make Your Asthma Worse    Triggers are things that make your asthma worse.  Look at the list below to help you find your triggers and what you can do about them.  You can help prevent asthma flare-ups by staying away from your triggers.      Trigger                                                          What you can do   Cigarette Smoke  Tobacco smoke can make asthma worse. Do not allow smoking in your home, car or around you.  Be sure no one smokes at a child s day care or school.  If you smoke, ask your health care provider for ways to help you quit.  Ask family members to quit too.  Ask your health care provider for a referral to Quit Plan to help you quit smoking, or call 6-899-783-PLAN.     Colds, Flu, Bronchitis  These are common triggers of asthma. Wash your hands often.  Don t touch your eyes, nose or mouth.  Get a flu shot every year.     Dust Mites  These are tiny bugs that live in cloth or carpet. They are too small to see. Wash sheets and blankets in hot water every week.   Encase pillows and mattress in dust mite proof covers.  Avoid having carpet if you can. If you have carpet, vacuum weekly.   Use a dust mask and HEPA vacuum.   Pollen and Outdoor Mold  Some people are allergic to trees, grass, or weed pollen, or molds. Try to keep your windows closed.  Limit time out doors when pollen count is high.   Ask you health care provider  about taking medicine during allergy season.     Animal Dander  Some people are allergic to skin flakes, urine or saliva from pets with fur or feathers. Keep pets with fur or feathers out of your home.    If you can t keep the pet outdoors, then keep the pet out of your bedroom.  Keep the bedroom door closed.  Keep pets off cloth furniture and away from stuffed toys.     Mice, Rats, and Cockroaches  Some people are allergic to the waste from these pests.   Cover food and garbage.  Clean up spills and food crumbs.  Store grease in the refrigerator.   Keep food out of the bedroom.   Indoor Mold  This can be a trigger if your home has high moisture. Fix leaking faucets, pipes, or other sources of water.   Clean moldy surfaces.  Dehumidify basement if it is damp and smelly.   Smoke, Strong Odors, and Sprays  These can reduce air quality. Stay away from strong odors and sprays, such as perfume, powder, hair spray, paints, smoke incense, paint, cleaning products, candles and new carpet.   Exercise or Sports  Some people with asthma have this trigger. Be active!  Ask your doctor about taking medicine before sports or exercise to prevent symptoms.    Warm up for 5-10 minutes before and after sports or exercise.     Other Triggers of Asthma  Cold air:  Cover your nose and mouth with a scarf.  Sometimes laughing or crying can be a trigger.  Some medicines and food can trigger asthma.

## 2017-03-14 NOTE — NURSING NOTE
"Chief Complaint   Patient presents with     Shoulder left     left arm pain since saturday     initial /80 (BP Location: Right arm, Cuff Size: Adult Large)  Pulse 91  Temp 98.5  F (36.9  C) (Oral)  Resp 16  Ht 5' 2\" (1.575 m)  Wt 206 lb (93.4 kg)  SpO2 99%  BMI 37.68 kg/m2 Estimated body mass index is 37.68 kg/(m^2) as calculated from the following:    Height as of this encounter: 5' 2\" (1.575 m).    Weight as of this encounter: 206 lb (93.4 kg).  BP completed using cuff size: large.   R arm      Health Maintenance that is potentially due pending provider review:  NONE    n/a    Ravinder Alexis ma  "

## 2017-03-15 ASSESSMENT — ASTHMA QUESTIONNAIRES: ACT_TOTALSCORE: 22
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
## 2017-03-23 ENCOUNTER — THERAPY VISIT (OUTPATIENT)
Dept: PHYSICAL THERAPY | Facility: CLINIC | Age: 27
End: 2017-03-23
Payer: COMMERCIAL

## 2017-03-23 DIAGNOSIS — M25.519 SHOULDER PAIN: Primary | ICD-10-CM

## 2017-03-23 PROCEDURE — 97161 PT EVAL LOW COMPLEX 20 MIN: CPT | Mod: GP | Performed by: PHYSICAL THERAPIST

## 2017-03-23 PROCEDURE — 97530 THERAPEUTIC ACTIVITIES: CPT | Mod: GP | Performed by: PHYSICAL THERAPIST

## 2017-03-23 PROCEDURE — 97110 THERAPEUTIC EXERCISES: CPT | Mod: GP | Performed by: PHYSICAL THERAPIST

## 2017-03-23 NOTE — MR AVS SNAPSHOT
After Visit Summary   3/23/2017    Michelle Joshua    MRN: [id number]           Patient Information     Date Of Birth          1990        Visit Information        Provider Department      3/23/2017 2:30 PM Bigg Scott PT [facility]        Today's Diagnoses     Shoulder pain    -  1       Follow-ups after your visit        Who to contact     If you have questions or need follow up information about today's clinic visit or your schedule please contact [facility] directly at [phone].  Normal or non-critical lab and imaging results will be communicated to you by Directworkshart, letter or phone within 4 business days after the clinic has received the results. If you do not hear from us within 7 days, please contact the clinic through SwingShott or phone. If you have a critical or abnormal lab result, we will notify you by phone as soon as possible.  Submit refill requests through Webvanta or call your pharmacy and they will forward the refill request to us. Please allow 3 business days for your refill to be completed.          Additional Information About Your Visit        MyChart Information     [employer] gives you secure access to your electronic health record. If you see a primary care provider, you can also send messages to your care team and make appointments. If you have questions, please call your primary care clinic.  If you do not have a primary care provider, please call [phone] and they will assist you.        Care EveryWhere ID     This is your Care EveryWhere ID. This could be used by other organizations to access your [facility] medical records  [id number]         Blood Pressure from Last 3 Encounters:   03/14/17 120/80   02/02/17 126/76   01/31/17 131/83    Weight from Last 3 Encounters:   03/14/17 93.4 kg (206 lb)   02/02/17 91.1 kg (200 lb 12.8 oz)   01/31/17 91.2 kg (201 lb)              We Performed  the Following     PT Eval, Low Complexity (30845)     Therapeutic Activities     Therapeutic Exercises        Primary Care Provider Office Phone # Fax #    Nam Prabhakar [phone]       [facility] [address] [facility]  [address]  [facility] [address]        Thank you!     Thank you for choosing [facility] [place]  for your care. Our goal is always to provide you with excellent care. Hearing back from our patients is one way we can continue to improve our services. Please take a few minutes to complete the written survey that you may receive in the mail after your visit with us. Thank you!             Your Updated Medication List - Protect others around you: Learn how to safely use, store and throw away your medicines at www.disposemymeds.org.          This list is accurate as of: 3/23/17  3:03 PM.  Always use your most recent med list.                   Brand Name Dispense Instructions for use    albuterol 108 (90 BASE) MCG/ACT Inhaler    PROAIR HFA/PROVENTIL HFA/VENTOLIN HFA    1 Inhaler    Inhale 2 puffs into the lungs every 6 hours as needed for shortness of breath / dyspnea or wheezing       medroxyPROGESTERone 150 MG/ML injection    DEPO-PROVERA    1 mL    Inject 1 mL (150 mg) into the muscle every 3 months
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
## 2017-04-10 ENCOUNTER — HOSPITAL ENCOUNTER (EMERGENCY)
Facility: CLINIC | Age: 27
Discharge: HOME | End: 2017-04-10
Attending: EMERGENCY MEDICINE | Admitting: EMERGENCY MEDICINE
Payer: OTHER MISCELLANEOUS

## 2017-04-10 ENCOUNTER — APPOINTMENT (OUTPATIENT)
Dept: GENERAL RADIOLOGY | Facility: CLINIC | Age: 27
End: 2017-04-10
Attending: EMERGENCY MEDICINE
Payer: OTHER MISCELLANEOUS

## 2017-04-10 VITALS
DIASTOLIC BLOOD PRESSURE: 101 MMHG | RESPIRATION RATE: 18 BRPM | SYSTOLIC BLOOD PRESSURE: 145 MMHG | TEMPERATURE: 99.2 F | HEART RATE: 99 BPM | OXYGEN SATURATION: 96 %
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
## 2017-04-10 DIAGNOSIS — S80.01XA CONTUSION OF RIGHT KNEE, INITIAL ENCOUNTER: ICD-10-CM

## 2017-04-10 PROCEDURE — 99283 EMERGENCY DEPT VISIT LOW MDM: CPT | Performed by: EMERGENCY MEDICINE

## 2017-04-10 PROCEDURE — 99284 EMERGENCY DEPT VISIT MOD MDM: CPT | Mod: Z6 | Performed by: EMERGENCY MEDICINE

## 2017-04-10 PROCEDURE — 25000132 ZZH RX MED GY IP 250 OP 250 PS 637: Performed by: EMERGENCY MEDICINE

## 2017-04-10 PROCEDURE — 73562 X-RAY EXAM OF KNEE 3: CPT | Mod: RT

## 2017-04-10 RX ORDER — OXYCODONE HYDROCHLORIDE 5 MG/1
5 TABLET ORAL ONCE
Status: COMPLETED | OUTPATIENT
Start: 2017-04-10 | End: 2017-04-10

## 2017-04-10 RX ORDER — NAPROXEN 500 MG/1
500 TABLET ORAL 2 TIMES DAILY WITH MEALS
Qty: 14 TABLET | Refills: 0 | Status: SHIPPED | OUTPATIENT
Start: 2017-04-10 | End: 2017-04-17

## 2017-04-10 RX ORDER — OXYCODONE HYDROCHLORIDE 5 MG/1
5 TABLET ORAL EVERY 6 HOURS PRN
Qty: 6 TABLET | Refills: 0 | Status: SHIPPED | OUTPATIENT
Start: 2017-04-10 | End: 2017-08-14

## 2017-04-10 RX ADMIN — OXYCODONE HYDROCHLORIDE 5 MG: 5 TABLET ORAL at 21:09

## 2017-04-10 ASSESSMENT — ENCOUNTER SYMPTOMS
HEADACHES: 0
ARTHRALGIAS: 1
BACK PAIN: 0
NECK PAIN: 0

## 2017-04-10 NOTE — LETTER
[facility], [place], EMERGENCY DEPARTMENT  [address] [address]  [phone]    April 10, 2017    Michelle Joshua  [address] [facility] [address]  [phone] (home)     : 1990      To Whom it may concern:    Michelle Joshua was seen in our Emergency Department today, April 10, 2017.  I expect her condition to improve over the next week.  She may return to work/school on 17 and will be on crutches until 2017.    Sincerely,        Giancarlo Cruz MD

## 2017-04-10 NOTE — ED AVS SNAPSHOT
[facility], [place], Emergency Department    [address]    [facility] [address]    Phone:  [phone]    Fax:  [phone]                                       Michelle Joshua   MRN: [id number]    Department:  [facility], Emergency Department   Date of Visit:  4/10/2017           After Visit Summary Signature Page     I have received my discharge instructions, and my questions have been answered. I have discussed any challenges I see with this plan with the nurse or doctor.    ..........................................................................................................................................  Patient/Patient Representative Signature      ..........................................................................................................................................  Patient Representative Print Name and Relationship to Patient    ..................................................               ................................................  Date                                            Time    ..........................................................................................................................................  Reviewed by Signature/Title    ...................................................              ..............................................  Date                                                            Time

## 2017-04-10 NOTE — ED AVS SNAPSHOT
[facility], Emergency Department    [address]    [facility] [place] [address]    Phone:  [phone]    Fax:  [phone]                                       Michelle Joshua   MRN: [id number]    Department:  [facility], Emergency Department   Date of Visit:  4/10/2017           Patient Information     Date Of Birth          1990        Your diagnoses for this visit were:     Contusion of right knee, initial encounter        You were seen by Giancarlo Cruz MD.        Discharge Instructions       Use crutches over the next 3 days and gradually increase weightbearing as tolerated.    Obtain a neoprene knee brace from a local pharmacy for support over the next week.    Please make an appointment to follow up with Your Primary Care Provider or our Sports Medicine Clinic (phone: [phone]) in 5-7 days unless symptoms completely resolve.    Discharge References/Attachments     BRUISES (CONTUSIONS) (ENGLISH)      24 Hour Appointment Hotline       To make an appointment at any [facility] clinic, call [phone] ([phone]). If you don't have a family doctor or clinic, we will help you find one. [facility] clinics are conveniently located to serve the needs of you and your family.          ED Discharge Orders     Crutches       Use gait belt during crutch training.                     Review of your medicines      START taking        Dose / Directions Last dose taken    naproxen 500 MG tablet   Commonly known as:  NAPROSYN   Dose:  500 mg   Quantity:  14 tablet        Take 1 tablet (500 mg) by mouth 2 times daily (with meals) for 7 days   Refills:  0        oxyCODONE 5 MG IR tablet   Commonly known as:  ROXICODONE   Dose:  5 mg   Quantity:  6 tablet        Take 1 tablet (5 mg) by mouth every 6 hours as needed for pain   Refills:  0          Our records show that you are taking the medicines listed below. If these are incorrect, please call your family doctor or clinic.        Dose /  Directions Last dose taken    albuterol 108 (90 BASE) MCG/ACT Inhaler   Commonly known as:  PROAIR HFA/PROVENTIL HFA/VENTOLIN HFA   Dose:  2 puff   Quantity:  1 Inhaler        Inhale 2 puffs into the lungs every 6 hours as needed for shortness of breath / dyspnea or wheezing   Refills:  1        medroxyPROGESTERone 150 MG/ML injection   Commonly known as:  DEPO-PROVERA   Dose:  150 mg   Quantity:  1 mL        Inject 1 mL (150 mg) into the muscle every 3 months   Refills:  1                Prescriptions were sent or printed at these locations (2 Prescriptions)                   Other Prescriptions                Printed at Department/Unit printer (2 of 2)         oxyCODONE (ROXICODONE) 5 MG IR tablet               naproxen (NAPROSYN) 500 MG tablet                Procedures and tests performed during your visit     Knee XR, 3 views, right      Orders Needing Specimen Collection     None      Pending Results     No orders found from 4/8/2017 to 4/11/2017.            Pending Culture Results     No orders found from 4/8/2017 to 4/11/2017.            Thank you for choosing [facility]       Thank you for choosing [facility] for your care. Our goal is always to provide you with excellent care. Hearing back from our patients is one way we can continue to improve our services. Please take a few minutes to complete the written survey that you may receive in the mail after you visit with us. Thank you!        [employer] Information     [employer] gives you secure access to your electronic health record. If you see a primary care provider, you can also send messages to your care team and make appointments. If you have questions, please call your primary care clinic.  If you do not have a primary care provider, please call [phone] and they will assist you.        Care EveryWhere ID     This is your Care EveryWhere ID. This could be used by other organizations to access your [facility] medical records  [id number]        After Visit  Summary       This is your record. Keep this with you and show to your community pharmacist(s) and doctor(s) at your next visit.

## 2017-04-11 NOTE — DISCHARGE INSTRUCTIONS
Use crutches over the next 3 days and gradually increase weightbearing as tolerated.    Obtain a neoprene knee brace from a local pharmacy for support over the next week.    Please make an appointment to follow up with Your Primary Care Provider or our Sports Medicine Clinic (phone: [phone]) in 5-7 days unless symptoms completely resolve.

## 2017-04-11 NOTE — ED PROVIDER NOTES
History     Chief Complaint   Patient presents with     Knee Pain     fell this morning at work on concrete and landed on the right knee     HPI  Michelle Joshua is a 26 year old female, who works in a bakery, who states that she slipped on a wet floor and landed directly onto her right knee, sliding across the floor on her knee. Patient denies any head, neck or back injury. Patient complains mostly of knee pain and states she has had difficulty ambulating since the incident. She presents to the ER with complaints of right knee pain.     I have reviewed the Medications, Allergies, Past Medical and Surgical History, and Social History in the [facility] system.    Past Medical History:   Diagnosis Date     Uncomplicated asthma      Unspecified otitis media     Otitis Media as a child       Past Surgical History:   Procedure Laterality Date     HC CREATE EARDRUM OPENING,GEN ANESTH      P.E. Tubes     HC REMOVE TONSILS/ADENOIDS,<12 Y/O      T & A <11y.o.       Family History   Problem Relation Age of Onset     Hypertension Mother        Social History   Substance Use Topics     Smoking status: Current Every Day Smoker     Packs/day: 0.25     Types: Cigarettes     Smokeless tobacco: Never Used      Comment: sometimes     Alcohol use No     Previous Medications    ALBUTEROL (PROAIR HFA/PROVENTIL HFA/VENTOLIN HFA) 108 (90 BASE) MCG/ACT INHALER    Inhale 2 puffs into the lungs every 6 hours as needed for shortness of breath / dyspnea or wheezing    MEDROXYPROGESTERONE (DEPO-PROVERA) 150 MG/ML INJECTION    Inject 1 mL (150 mg) into the muscle every 3 months          Allergies   Allergen Reactions     Bees      Penicillins      Sulfa Drugs        Review of Systems   Musculoskeletal: Positive for arthralgias (right knee) and gait problem. Negative for back pain and neck pain.   Neurological: Negative for headaches.   All other systems reviewed and are negative.      Physical Exam   BP: 135/72  Pulse: 101  Temp: 99.2  F (37.3   C)  Resp: 16  SpO2: 100 %  Physical Exam   Constitutional: She is oriented to person, place, and time.   Alert and conversant   HENT:   Head: Atraumatic.   Eyes: EOM are normal. Pupils are equal, round, and reactive to light.   Pulmonary/Chest: No respiratory distress.   Musculoskeletal:   Pelvic rock negative.  Extremities all intact and nontender except for the right lower extremity which does reveal tenderness over the anterior aspect of the right knee without significant effusion and without any instability.  There is no medial or lateral joint line tenderness and there is a negative Micheal's.  Distal CMS intact.  Lachman's negative   Neurological: She is alert and oriented to person, place, and time. No cranial nerve deficit.   Skin: No rash noted.   Psychiatric: She has a normal mood and affect.       ED Course     ED Course     Procedures        Results for orders placed or performed during the hospital encounter of 04/10/17   Knee XR, 3 views, right    Narrative    RIGHT KNEE THREE VIEWS   4/10/2017 9:18 PM     HISTORY: Trauma.    COMPARISON: None.    FINDINGS: Negative right knee. No fracture.      Impression    IMPRESSION: Negative.    BRENDAN HARDY MD       Assessments & Plan (with Medical Decision Making)     I have reviewed the nursing notes.    At this time the patient will be set up with crutches and will obtain a knee brace from her local pharmacy.  Work note will be given for the patient.    I have reviewed the findings, diagnosis, plan and need for follow up with the patient.    New Prescriptions    NAPROXEN (NAPROSYN) 500 MG TABLET    Take 1 tablet (500 mg) by mouth 2 times daily (with meals) for 7 days    OXYCODONE (ROXICODONE) 5 MG IR TABLET    Take 1 tablet (5 mg) by mouth every 6 hours as needed for pain       Final diagnoses:   Contusion of right knee, initial encounter     Use crutches over the next 3 days and gradually increase weightbearing as tolerated.    Obtain a neoprene knee  brace from a local pharmacy for support over the next week.    Please make an appointment to follow up with Your Primary Care Provider or our Sports Medicine Clinic (phone: [phone]) in 5-7 days unless symptoms completely resolve.    Routine discharge instructions were given for this diagnosis.    MD TONI Duvall Erin R Pattison, am serving as a trained medical scribe to document services personally performed by Giancarlo Cruz MD, based on the provider's statements to me.      IGiancarlo MD, was physically present and have reviewed and verified the accuracy of this note documented by Nena Nichole.       4/10/2017   [facility], [place], EMERGENCY DEPARTMENT     Giancarlo Cruz MD  04/10/17 [id number]

## 2017-05-02 ENCOUNTER — ALLIED HEALTH/NURSE VISIT (OUTPATIENT)
Dept: NURSING | Facility: CLINIC | Age: 27
End: 2017-05-02
Payer: COMMERCIAL

## 2017-05-02 PROCEDURE — 96372 THER/PROPH/DIAG INJ SC/IM: CPT

## 2017-05-02 PROCEDURE — 99207 ZZC NO CHARGE NURSE ONLY: CPT

## 2017-07-18 ENCOUNTER — ALLIED HEALTH/NURSE VISIT (OUTPATIENT)
Dept: NURSING | Facility: CLINIC | Age: 27
End: 2017-07-18
Payer: COMMERCIAL

## 2017-07-18 PROCEDURE — 96372 THER/PROPH/DIAG INJ SC/IM: CPT

## 2017-07-18 NOTE — NURSING NOTE
"Chief Complaint   Patient presents with     Allied Health Visit     Imm/Inj     Depo Shot Next one due 10/03/2017-10/17/2017      There were no vitals taken for this visit. Estimated body mass index is 37.68 kg/(m^2) as calculated from the following:    Height as of 3/14/17: 5' 2\" (1.575 m).    Weight as of 3/14/17: 206 lb (93.4 kg).  bp completed using cuff size: NA (Not Taken)       Health Maintenance addressed:  NONE    n/a    Nancy Rangel MA     "

## 2017-07-18 NOTE — MR AVS SNAPSHOT
After Visit Summary   7/18/2017    Michelle Joshua    MRN: [id number]           Patient Information     Date Of Birth          1990        Visit Information        Provider Department      7/18/2017 2:00 PM UP NURSE [facility] Nurse        Today's Diagnoses     Contraception    -  1       Follow-ups after your visit        Who to contact     If you have questions or need follow up information about today's clinic visit or your schedule please contact [facility]UR[facility] directly at [phone].  Normal or non-critical lab and imaging results will be communicated to you by MyChart, letter or phone within 4 business days after the clinic has received the results. If you do not hear from us within 7 days, please contact the clinic through HYGIEIAhart or phone. If you have a critical or abnormal lab result, we will notify you by phone as soon as possible.  Submit refill requests through Semantra or call your pharmacy and they will forward the refill request to us. Please allow 3 business days for your refill to be completed.          Additional Information About Your Visit        MyChart Information     Semantra gives you secure access to your electronic health record. If you see a primary care provider, you can also send messages to your care team and make appointments. If you have questions, please call your primary care clinic.  If you do not have a primary care provider, please call [phone] and they will assist you.        Care EveryWhere ID     This is your Care EveryWhere ID. This could be used by other organizations to access your [facility] medical records  [id number]         Blood Pressure from Last 3 Encounters:   04/10/17 (!) 145/101   03/14/17 120/80   02/02/17 126/76    Weight from Last 3 Encounters:   03/14/17 206 lb (93.4 kg)   02/02/17 200 lb 12.8 oz (91.1 kg)   01/31/17 201 lb (91.2 kg)              We Performed the Following     INJECTION INTRAMUSCULAR OR SUB-Q      Medroxyprogesterone inj  1mg   (Depo Provera J-Code)        Primary Care Provider Office Phone # Fax #    Aurora Youssef [phone]       [facility] [address]   [facility] [address]        Equal Access to Services     RENE JAMES : Hadii aad ku hadasho Soomaali, waaxda luqadaha, qaybta kaalmada adeegyada, waxay idiin hayaan adeblossom box lashiva . So [facility] [phone].    ATENCIÓN: Si habla español, tiene a balderas disposición servicios gratuitos de asistencia lingüística. Ainsleyame al [phone].    We comply with applicable federal civil rights laws and [place] laws. We do not discriminate on the basis of race, color, national origin, age, disability sex, sexual orientation or gender identity.            Thank you!     Thank you for choosing [facility] NURSE  for your care. Our goal is always to provide you with excellent care. Hearing back from our patients is one way we can continue to improve our services. Please take a few minutes to complete the written survey that you may receive in the mail after your visit with us. Thank you!             Your Updated Medication List - Protect others around you: Learn how to safely use, store and throw away your medicines at www.disposemymeds.org.          This list is accurate as of: 7/18/17  2:36 PM.  Always use your most recent med list.                   Brand Name Dispense Instructions for use Diagnosis    albuterol 108 (90 BASE) MCG/ACT Inhaler    PROAIR HFA/PROVENTIL HFA/VENTOLIN HFA    1 Inhaler    Inhale 2 puffs into the lungs every 6 hours as needed for shortness of breath / dyspnea or wheezing    Mild intermittent asthma without complication       medroxyPROGESTERone 150 MG/ML injection    DEPO-PROVERA    1 mL    Inject 1 mL (150 mg) into the muscle every 3 months    Depo-Provera contraceptive status, Encounter for initial prescription of injectable contraceptive       oxyCODONE 5 MG IR tablet    ROXICODONE    6 tablet     Take 1 tablet (5 mg) by mouth every 6 hours as needed for pain

## 2017-08-11 ENCOUNTER — TELEPHONE (OUTPATIENT)
Dept: FAMILY MEDICINE | Facility: CLINIC | Age: 27
End: 2017-08-11

## 2017-08-11 DIAGNOSIS — T63.441S BEE STING-INDUCED ANAPHYLAXIS, ACCIDENTAL OR UNINTENTIONAL, SEQUELA: ICD-10-CM

## 2017-08-11 DIAGNOSIS — T78.2XXS BEE STING-INDUCED ANAPHYLAXIS, ACCIDENTAL OR UNINTENTIONAL, SEQUELA: ICD-10-CM

## 2017-08-11 DIAGNOSIS — Z91.09 ENVIRONMENTAL ALLERGIES: ICD-10-CM

## 2017-08-11 DIAGNOSIS — J45.20 MILD INTERMITTENT ASTHMA WITHOUT COMPLICATION: ICD-10-CM

## 2017-08-11 RX ORDER — EPINEPHRINE 0.3 MG/.3ML
0.3 INJECTION SUBCUTANEOUS
COMMUNITY
Start: 2017-08-11 | End: 2018-06-08

## 2017-08-11 RX ORDER — LORATADINE 10 MG/1
10 TABLET ORAL DAILY
COMMUNITY
Start: 2017-08-11 | End: 2018-06-08

## 2017-08-11 RX ORDER — ALBUTEROL SULFATE 90 UG/1
2 AEROSOL, METERED RESPIRATORY (INHALATION) EVERY 6 HOURS PRN
Qty: 1 INHALER | Refills: 1 | Status: SHIPPED | OUTPATIENT
Start: 2017-08-11 | End: 2018-05-02

## 2017-08-11 RX ORDER — FLUTICASONE PROPIONATE 50 MCG
1-2 SPRAY, SUSPENSION (ML) NASAL DAILY
Qty: 1 BOTTLE | Refills: 1 | Status: SHIPPED | OUTPATIENT
Start: 2017-08-11 | End: 2018-06-08

## 2017-08-11 NOTE — TELEPHONE ENCOUNTER
"LS,  Patient calling regarding cough that started last night.  See below triage message if needed, advised OTC treatment, rest, etc.  Patient requesting refill of Albuterol and Flonase though  Flonase no longer on active med list.  Orders pended if you approve.  Please advise.  Yolanda SANDOVAL RN    Triage: pt doesn't need call unless issues        Last night had dry cough  Today cough is productive  Has headache to (R) side  Chest hurts at times d/t coughing  Doesn't know if has fever; doesn't \"feel\" warm  Runny nose, but congested also  Has not tried any OTC meds  Advised continuing to monitor since s/s only present 1 day  Rest over the weekend, push fluids, take Tylenol for fever/pain PRN.  Also get OTC cough med and take meds above if approved (Flonase and Albuterol)  Recommended saline nasal spray too  If worsens or continues, then appt  Pt agrees with plan.        "

## 2017-08-11 NOTE — TELEPHONE ENCOUNTER
Reason for call:  Patient reporting a symptom    Symptom or request: cough/burning  Burning in chest  Nose is stuffed/phlem  Headache on right  Everything started this morning, beside cough started last night  Pt wants to speak to triage before making an appointment    Duration (how long have symptoms been present): yesterday    Have you been treated for this before? No    Additional comments:     Phone Number patient can be reached at:  Home number on file [phone] (home)    Best Time:  anytime    Can we leave a detailed message on this number:  YES    Call taken on 8/11/2017 at 11:59 AM by Heike Alberts

## 2017-08-14 ENCOUNTER — OFFICE VISIT (OUTPATIENT)
Dept: FAMILY MEDICINE | Facility: CLINIC | Age: 27
End: 2017-08-14
Payer: COMMERCIAL

## 2017-08-14 VITALS
SYSTOLIC BLOOD PRESSURE: 130 MMHG | WEIGHT: 202 LBS | BODY MASS INDEX: 37.17 KG/M2 | RESPIRATION RATE: 16 BRPM | DIASTOLIC BLOOD PRESSURE: 78 MMHG | TEMPERATURE: 98.1 F | HEART RATE: 89 BPM | HEIGHT: 62 IN

## 2017-08-14 DIAGNOSIS — J06.9 VIRAL URI WITH COUGH: ICD-10-CM

## 2017-08-14 DIAGNOSIS — R07.0 THROAT PAIN: Primary | ICD-10-CM

## 2017-08-14 LAB
DEPRECATED S PYO AG THROAT QL EIA: NORMAL
MICRO REPORT STATUS: NORMAL
SPECIMEN SOURCE: NORMAL

## 2017-08-14 PROCEDURE — 87081 CULTURE SCREEN ONLY: CPT | Performed by: FAMILY MEDICINE

## 2017-08-14 PROCEDURE — 99213 OFFICE O/P EST LOW 20 MIN: CPT | Performed by: FAMILY MEDICINE

## 2017-08-14 PROCEDURE — 87880 STREP A ASSAY W/OPTIC: CPT | Performed by: FAMILY MEDICINE

## 2017-08-14 RX ORDER — FEXOFENADINE HCL AND PSEUDOEPHEDRINE HCL 180; 240 MG/1; MG/1
1 TABLET, EXTENDED RELEASE ORAL DAILY
Qty: 14 TABLET | Refills: 0 | Status: SHIPPED | OUTPATIENT
Start: 2017-08-14 | End: 2021-09-07 | Stop reason: ALTCHOICE

## 2017-08-14 RX ORDER — ALBUTEROL SULFATE 90 UG/1
2 AEROSOL, METERED RESPIRATORY (INHALATION) EVERY 6 HOURS PRN
Qty: 1 INHALER | Refills: 1 | Status: SHIPPED | OUTPATIENT
Start: 2017-08-14 | End: 2019-04-12

## 2017-08-14 NOTE — MR AVS SNAPSHOT
After Visit Summary   8/14/2017    Michelle Joshua    MRN: [id number]           Patient Information     Date Of Birth          1990        Visit Information        Provider Department      8/14/2017 2:45 PM Constanza Vargas MD [facility]        Today's Diagnoses     Throat pain    -  1    Viral URI with cough          Care Instructions    Stuffy nose:  Nasal saline OTC every 2-4 hours to clean out sinuses  Humidifier at bedside at night and steam face before bed  Benadryl for stuffy nose at night  Vicks can help too  Avoid Afrin - only use for 3 nights if needed    Cough:  Moist steam before bed and in morning  Mucinex can help cough    Nasal/sinus pressure:  Dayquil or sudafed during the day  Benadryl at night.  Mucinex might also really help  Lots of steam and moist air to avoid dryness           Follow-ups after your visit        Who to contact     If you have questions or need follow up information about today's clinic visit or your schedule please contact [facility] directly at [phone].  Normal or non-critical lab and imaging results will be communicated to you by Reds10hart, letter or phone within 4 business days after the clinic has received the results. If you do not hear from us within 7 days, please contact the clinic through Circat or phone. If you have a critical or abnormal lab result, we will notify you by phone as soon as possible.  Submit refill requests through Medigus or call your pharmacy and they will forward the refill request to us. Please allow 3 business days for your refill to be completed.          Additional Information About Your Visit        MyChart Information     Medigus gives you secure access to your electronic health record. If you see a primary care provider, you can also send messages to your care team and make appointments. If you have questions, please call your primary care clinic.  If you do not have a primary care  "provider, please call [phone] and they will assist you.        Care EveryWhere ID     This is your Care EveryWhere ID. This could be used by other organizations to access your [facility] medical records  [id number]        Your Vitals Were     Pulse Temperature Respirations Height BMI (Body Mass Index)       89 98.1  F (36.7  C) (Tympanic) 16 5' 2\" (1.575 m) 36.95 kg/m2        Blood Pressure from Last 3 Encounters:   08/14/17 130/78   04/10/17 (!) 145/101   03/14/17 120/80    Weight from Last 3 Encounters:   08/14/17 202 lb (91.6 kg)   03/14/17 206 lb (93.4 kg)   02/02/17 200 lb 12.8 oz (91.1 kg)              We Performed the Following     Rapid strep screen          Today's Medication Changes          These changes are accurate as of: 8/14/17  3:55 PM.  If you have any questions, ask your nurse or doctor.               Start taking these medicines.        Dose/Directions    fexofenadine-pseudoePHEDrine 180-240 MG per 24 hr tablet   Commonly known as:  ALLEGRA-D 24   Used for:  Viral URI with cough   Started by:  Constanza Vargas MD        Dose:  1 tablet   Take 1 tablet by mouth daily   Quantity:  14 tablet   Refills:  0         These medicines have changed or have updated prescriptions.        Dose/Directions    * albuterol 108 (90 BASE) MCG/ACT Inhaler   Commonly known as:  PROAIR HFA/PROVENTIL HFA/VENTOLIN HFA   This may have changed:  Another medication with the same name was added. Make sure you understand how and when to take each.   Used for:  Mild intermittent asthma without complication   Changed by:  Aurora Youssef MD        Dose:  2 puff   Inhale 2 puffs into the lungs every 6 hours as needed for shortness of breath / dyspnea or wheezing   Quantity:  1 Inhaler   Refills:  1       * albuterol 108 (90 BASE) MCG/ACT Inhaler   Commonly known as:  PROAIR HFA/PROVENTIL HFA/VENTOLIN HFA   This may have changed:  You were already taking a medication with the same name, and this prescription was added. " Make sure you understand how and when to take each.   Used for:  Viral URI with cough   Changed by:  Constanza Vargas MD        Dose:  2 puff   Inhale 2 puffs into the lungs every 6 hours as needed for shortness of breath / dyspnea or wheezing   Quantity:  1 Inhaler   Refills:  1       * Notice:  This list has 2 medication(s) that are the same as other medications prescribed for you. Read the directions carefully, and ask your doctor or other care provider to review them with you.         Where to get your medicines      These medications were sent to [facility] [address] [facility] [address] [place] [address] AVE [place] AT [facility] [address] [facility]  [address], [facility] [address]     Phone:  [phone]     albuterol 108 (90 BASE) MCG/ACT Inhaler         Some of these will need a paper prescription and others can be bought over the counter.  Ask your nurse if you have questions.     Bring a paper prescription for each of these medications     fexofenadine-pseudoePHEDrine 180-240 MG per 24 hr tablet                Primary Care Provider Office Phone # Fax #    Aurora Youssef [phone] [address] [facility]   [facility] [address]        Equal Access to Services     RENE JAMES AH: Hadii mary carranzao Sokev, waaxda luqadaha, qaybta kaalmada adeegyada, gladis john. So [facility] [phone].    ATENCIÓN: Si habla español, tiene a balderas disposición servicios gratuitos de asistencia lingüística. Llame al [phone].    We comply with applicable federal civil rights laws and [place] laws. We do not discriminate on the basis of race, color, national origin, age, disability sex, sexual orientation or gender identity.            Thank you!     Thank you for choosing [facility]  for your care. Our goal is always to provide you with excellent care. Hearing back from our patients is one way we can continue to improve our services. Please take a  few minutes to complete the written survey that you may receive in the mail after your visit with us. Thank you!             Your Updated Medication List - Protect others around you: Learn how to safely use, store and throw away your medicines at www.disposemymeds.org.          This list is accurate as of: 8/14/17  3:55 PM.  Always use your most recent med list.                   Brand Name Dispense Instructions for use Diagnosis    * albuterol 108 (90 BASE) MCG/ACT Inhaler    PROAIR HFA/PROVENTIL HFA/VENTOLIN HFA    1 Inhaler    Inhale 2 puffs into the lungs every 6 hours as needed for shortness of breath / dyspnea or wheezing    Mild intermittent asthma without complication       * albuterol 108 (90 BASE) MCG/ACT Inhaler    PROAIR HFA/PROVENTIL HFA/VENTOLIN HFA    1 Inhaler    Inhale 2 puffs into the lungs every 6 hours as needed for shortness of breath / dyspnea or wheezing    Viral URI with cough       CLARITIN 10 MG tablet   Generic drug:  loratadine      Take 1 tablet (10 mg) by mouth daily        EPINEPHrine 0.3 MG/0.3ML injection 2-pack    EPIPEN/ADRENACLICK/or ANY BX GENERIC EQUIV     Inject 0.3 mLs (0.3 mg) into the muscle once as needed for anaphylaxis    Bee sting-induced anaphylaxis, accidental or unintentional, sequela       fexofenadine-pseudoePHEDrine 180-240 MG per 24 hr tablet    ALLEGRA-D 24    14 tablet    Take 1 tablet by mouth daily    Viral URI with cough       fluticasone 50 MCG/ACT spray    FLONASE    1 Bottle    Spray 1-2 sprays into both nostrils daily    Environmental allergies       medroxyPROGESTERone 150 MG/ML injection    DEPO-PROVERA    1 mL    Inject 1 mL (150 mg) into the muscle every 3 months    Depo-Provera contraceptive status, Encounter for initial prescription of injectable contraceptive       * Notice:  This list has 2 medication(s) that are the same as other medications prescribed for you. Read the directions carefully, and ask your doctor or other care provider to review  them with you.

## 2017-08-14 NOTE — PATIENT INSTRUCTIONS
Stuffy nose:  Nasal saline OTC every 2-4 hours to clean out sinuses  Humidifier at bedside at night and steam face before bed  Benadryl for stuffy nose at night  Vicks can help too  Avoid Afrin - only use for 3 nights if needed    Cough:  Moist steam before bed and in morning  Mucinex can help cough    Nasal/sinus pressure:  Dayquil or sudafed during the day  Benadryl at night.  Mucinex might also really help  Lots of steam and moist air to avoid dryness

## 2017-08-14 NOTE — PROGRESS NOTES
SUBJECTIVE:                                                    Michelle Joshua is a 26 year old female who presents to clinic today for the following health issues:      RESPIRATORY SYMPTOMS- hx of Asthma so sx worse.      Duration: 3 days    Description  rhinorrhea, sore throat, facial pain/pressure, cough and having back pain due to coughing    Severity: moderate-severe    Accompanying signs and symptoms: None    History (predisposing factors):  none    Precipitating or alleviating factors: None    Therapies tried and outcome:  NyQuil- with little to no relief. (and her inhalers)    3 days of symptoms see above    Needs albuterol  Has asthma- gets worse with illness          Problem list and histories reviewed & adjusted, as indicated.  Additional history: as documented    Patient Active Problem List   Diagnosis     Allergic rhinitis     Other acne     CARDIOVASCULAR SCREENING; LDL GOAL LESS THAN 160     Nicotine addiction     Bee sting-induced anaphylaxis     Bunion of left foot     Mild intermittent asthma     Obesity     Hip pain, chronic     Myalgia and myositis     Supervision of other normal pregnancy, antepartum     Need for Tdap vaccination     Encounter for triage in pregnant patient     Group B Streptococcus carrier, +RV culture, currently pregnant     Pregnancy      (spontaneous vaginal delivery)     Contraception     Shoulder pain     Past Surgical History:   Procedure Laterality Date     HC CREATE EARDRUM OPENING,GEN ANESTH      P.E. Tubes     HC REMOVE TONSILS/ADENOIDS,<10 Y/O      T & A <11y.o.       Social History   Substance Use Topics     Smoking status: Current Every Day Smoker     Packs/day: 0.25     Types: Cigarettes     Smokeless tobacco: Never Used      Comment: sometimes     Alcohol use No     Family History   Problem Relation Age of Onset     Hypertension Mother          Current Outpatient Prescriptions   Medication Sig Dispense Refill     fluticasone (FLONASE) 50 MCG/ACT spray  "Spray 1-2 sprays into both nostrils daily 1 Bottle 1     loratadine (CLARITIN) 10 MG tablet Take 1 tablet (10 mg) by mouth daily       EPINEPHrine (EPIPEN/ADRENACLICK/OR ANY BX GENERIC EQUIV) 0.3 MG/0.3ML injection 2-pack Inject 0.3 mLs (0.3 mg) into the muscle once as needed for anaphylaxis       albuterol (PROAIR HFA/PROVENTIL HFA/VENTOLIN HFA) 108 (90 BASE) MCG/ACT Inhaler Inhale 2 puffs into the lungs every 6 hours as needed for shortness of breath / dyspnea or wheezing 1 Inhaler 1     medroxyPROGESTERone (DEPO-PROVERA) 150 MG/ML injection Inject 1 mL (150 mg) into the muscle every 3 months 1 mL 1     [DISCONTINUED] fluticasone (FLOVENT DISKUS) 100 MCG/BLIST AEPB Inhale 1 puff (100 mcg) into the lungs 2 times daily 1 each 3     BP Readings from Last 3 Encounters:   08/14/17 130/78   04/10/17 (!) 145/101   03/14/17 120/80    Wt Readings from Last 3 Encounters:   08/14/17 202 lb (91.6 kg)   03/14/17 206 lb (93.4 kg)   02/02/17 200 lb 12.8 oz (91.1 kg)                  Labs reviewed in EPIC        Reviewed and updated as needed this visit by clinical staffTobacco  Allergies  Meds  Med Hx  Surg Hx  Fam Hx  Soc Hx      Reviewed and updated as needed this visit by Provider         ROS:  Constitutional, HEENT, cardiovascular, pulmonary, gi and gu systems are negative, except as otherwise noted.      OBJECTIVE:                                                    /78  Pulse 89  Temp 98.1  F (36.7  C) (Tympanic)  Resp 16  Ht 5' 2\" (1.575 m)  Wt 202 lb (91.6 kg)  BMI 36.95 kg/m2  Body mass index is 36.95 kg/(m^2).  GENERAL APPEARANCE: mild distress and fatigued  EYES: Eyes grossly normal to inspection  HENT: rhinorrhea clear, oral mucous membranes moist and tonsillar erythema  NECK: no adenopathy, no asymmetry, masses, or scars and thyroid normal to palpation  RESP: lungs clear to auscultation - no rales, rhonchi or wheezes  CV: regular rates and rhythm, normal S1 S2, no S3 or S4 and no murmur, click or " rub    Diagnostic test results:  Diagnostic Test Results:  Results for orders placed or performed in visit on 08/14/17 (from the past 24 hour(s))   Rapid strep screen   Result Value Ref Range    Specimen Description Throat     Rapid Strep A Screen       NEGATIVE: No Group A streptococcal antigen detected by immunoassay, await   culture report.      Micro Report Status FINAL 08/14/2017           ASSESSMENT/PLAN:                                                    1. Throat pain  Viral URI  Reviewed viral upper restaurant infection, supportive cares as noted below and reviewed in after visit summary  - Rapid strep screen      Encouraged to call this is no better  Follow up with Provider - as needed     Constanza Vargas MD  [facility]

## 2017-08-14 NOTE — LETTER
[facility]  [address]  [address]  [place], [place] [address]  [phone]    August 14, 2017    RE:  Michelle Joshua                                                                                                                                                       [address]  [facility] [address]            To whom it may concern:    Michelle Joshua is under my professional care for    Throat pain  Viral URI with cough.   She  may return to work with the following: No restrictions on or about 3-5 days once feeling better.          Sincerely,        Constanza Vargas MD      Clinic hours:  Monday 7:30 AM - 5:00 PM    Tuesday  7:00 AM - 7:00 PM    Wednesday  7:00 AM - 5:00 PM    Thursday  7:30 AM -  7:00 PM    Friday   7:30 AM -  5:00 PM

## 2017-08-15 ENCOUNTER — TELEPHONE (OUTPATIENT)
Dept: FAMILY MEDICINE | Facility: CLINIC | Age: 27
End: 2017-08-15

## 2017-08-15 NOTE — TELEPHONE ENCOUNTER
She would need to be seen , since she has asthma , I would need to check her breathing and oxygen saturation, I am not sure if this is bronchitis vs her asthma exacerbation.  Cam you let her know ?  Thanks

## 2017-08-15 NOTE — TELEPHONE ENCOUNTER
Patient informed  Scheduled appt; if feeling better by then, will callback to cancel appt    Next 5 appointments (look out 90 days)     Aug 18, 2017 12:00 PM CDT   SHORT with Aurora Youssef MD   [facility] ([facility])    [address]  [facility] [address]   [phone]                Yolanda SANDOVAL RN

## 2017-08-15 NOTE — TELEPHONE ENCOUNTER
Reason for call:  Patient reporting a symptom    Symptom or request: bad congestion for 5 days, was seen yesterday but  Not feeling any  better    Duration (how long have symptoms been present): 5 days    Have you been treated for this before? Yes    Additional comments: would like to possibly try steroids     Phone Number patient can be reached at:  Home number on file [phone] (home)    Best Time:  anytime    Can we leave a detailed message on this number:  YES    Call taken on 8/15/2017 at 10:14 AM by Janeth Josue

## 2017-08-15 NOTE — TELEPHONE ENCOUNTER
LS,  Patient seen by SN yesterday (8/14) for viral URI  Patient states she isn't any better today  Has productive cough and sore throat still  Requesting something further for symptom treatment.  Also said her insurance does not cover Allegra-D, was unable to pick this up at pharmacy  Pharmacy pended.  Please advise.  Yolanda SANDOVAL RN

## 2017-08-16 LAB
BACTERIA SPEC CULT: NORMAL
SPECIMEN SOURCE: NORMAL

## 2017-08-25 ENCOUNTER — TELEPHONE (OUTPATIENT)
Dept: FAMILY MEDICINE | Facility: CLINIC | Age: 27
End: 2017-08-25

## 2017-08-25 NOTE — TELEPHONE ENCOUNTER
Patient states he ears have been plugged since Tuesday.  Hard to hear at times.  Denies pain  Needs late day appointment. Works until 4  Scheduled patient to see DIEGO on Monday 8/28 at 4:40  Kiah Weber RN

## 2017-08-25 NOTE — TELEPHONE ENCOUNTER
Reason for call:  Patient reporting a symptom    Symptom or request: having problems hearing in both ears    Duration (how long have symptoms been present): tuesday    Have you been treated for this before? No    Additional comments:     Phone Number patient can be reached at:  Home number on file [phone] (home)    Best Time:  any    Can we leave a detailed message on this number:  YES    Call taken on 8/25/2017 at 12:52 PM by Letitia Cronin

## 2017-08-28 ENCOUNTER — OFFICE VISIT (OUTPATIENT)
Dept: FAMILY MEDICINE | Facility: CLINIC | Age: 27
End: 2017-08-28
Payer: COMMERCIAL

## 2017-08-28 VITALS
WEIGHT: 204 LBS | SYSTOLIC BLOOD PRESSURE: 127 MMHG | DIASTOLIC BLOOD PRESSURE: 77 MMHG | TEMPERATURE: 98.5 F | HEART RATE: 98 BPM | BODY MASS INDEX: 37.31 KG/M2

## 2017-08-28 DIAGNOSIS — H69.93 DYSFUNCTION OF EUSTACHIAN TUBE, BILATERAL: Primary | ICD-10-CM

## 2017-08-28 DIAGNOSIS — J30.2 CHRONIC SEASONAL ALLERGIC RHINITIS, UNSPECIFIED TRIGGER: ICD-10-CM

## 2017-08-28 PROCEDURE — 99213 OFFICE O/P EST LOW 20 MIN: CPT | Performed by: PHYSICIAN ASSISTANT

## 2017-08-28 RX ORDER — METHYLPREDNISOLONE 4 MG
TABLET, DOSE PACK ORAL
Qty: 21 TABLET | Refills: 0 | Status: SHIPPED | OUTPATIENT
Start: 2017-08-28 | End: 2018-06-12

## 2017-08-28 ASSESSMENT — PATIENT HEALTH QUESTIONNAIRE - PHQ9: SUM OF ALL RESPONSES TO PHQ QUESTIONS 1-9: 0

## 2017-08-28 NOTE — PROGRESS NOTES
"  SUBJECTIVE:   Michelle Joshua is a 26 year old female who presents to clinic today for the following health issues:      Acute Illness   Acute illness concerns: ears plugged   Onset: ongoing     Fever: no    Chills/Sweats: YES    Headache (location?): YES    Sinus Pressure:YES    Conjunctivitis:  YES- itchy    Ear Pain: YES- \"pinchy\"     Rhinorrhea: YES    Congestion: YES    Sore Throat: no      Cough: YES-productive of clear sputum, productive of yellow sputum, productive of green sputum    Wheeze: YES    Decreased Appetite: YES    Nausea: no    Vomiting: no    Diarrhea:  no    Dysuria/Freq.: no    Fatigue/Achiness: YES    Sick/Strep Exposure: no     Therapies Tried and outcome: nyquil and nasal spray             Problem list and histories reviewed & adjusted, as indicated.  Additional history: seemed to start with cold like symptoms, but now her ears have been more plugged with slight pain.  Has not really done much for treatment.      She does have history seasonal allergies and those do seemed to be flared up.  Denies any fevers, chills.    BP Readings from Last 3 Encounters:   08/28/17 127/77   08/14/17 130/78   04/10/17 (!) 145/101    Wt Readings from Last 3 Encounters:   08/28/17 204 lb (92.5 kg)   08/14/17 202 lb (91.6 kg)   03/14/17 206 lb (93.4 kg)                      Reviewed and updated as needed this visit by clinical staffTobacco       Reviewed and updated as needed this visit by Provider         ROS:  Constitutional, HEENT, cardiovascular, pulmonary, gi and gu systems are negative, except as otherwise noted.      OBJECTIVE:   /77  Pulse 98  Temp 98.5  F (36.9  C) (Oral)  Wt 204 lb (92.5 kg)  BMI 37.31 kg/m2  Body mass index is 37.31 kg/(m^2).  GENERAL: alert and no distress  EYES: Eyes grossly normal to inspection  HENT: normal cephalic/atraumatic, both ears: clear effusion, nasal mucosa edematous , oropharynx clear and oral mucous membranes moist  NECK: no adenopathy, no asymmetry, " masses, or scars and thyroid normal to palpation  RESP: lungs clear to auscultation - no rales, rhonchi or wheezes  CV: regular rate and rhythm, normal S1 S2, no S3 or S4, no murmur, click or rub, no peripheral edema and peripheral pulses strong  PSYCH: mentation appears normal, affect normal/bright    Diagnostic Test Results:  none     ASSESSMENT/PLAN:         BP Screening:   Last 3 BP Readings:    BP Readings from Last 3 Encounters:   08/28/17 127/77   08/14/17 130/78   04/10/17 (!) 145/101       The following was recommended to the patient:  Re-screen BP within a year and recommended lifestyle modifications      1. Dysfunction of eustachian tube, bilateral    - methylPREDNISolone (MEDROL DOSEPAK) 4 MG tablet; Follow package instructions  Dispense: 21 tablet; Refill: 0    2. Chronic seasonal allergic rhinitis, unspecified trigger    - methylPREDNISolone (MEDROL DOSEPAK) 4 MG tablet; Follow package instructions  Dispense: 21 tablet; Refill: 0    Follow up if symptoms persist or worsen     Bimal Pearl PA-C  [facility]

## 2017-08-28 NOTE — MR AVS SNAPSHOT
After Visit Summary   8/28/2017    Michelle Joshua    MRN: [id number]           Patient Information     Date Of Birth          1990        Visit Information        Provider Department      8/28/2017 4:40 PM Bimal Pearl PA-C [facility]        Today's Diagnoses     Dysfunction of eustachian tube, bilateral    -  1    Chronic seasonal allergic rhinitis, unspecified trigger           Follow-ups after your visit        Who to contact     If you have questions or need follow up information about today's clinic visit or your schedule please contact [facility] directly at [phone].  Normal or non-critical lab and imaging results will be communicated to you by MyChart, letter or phone within 4 business days after the clinic has received the results. If you do not hear from us within 7 days, please contact the clinic through [employer] or phone. If you have a critical or abnormal lab result, we will notify you by phone as soon as possible.  Submit refill requests through [facility] or call your pharmacy and they will forward the refill request to us. Please allow 3 business days for your refill to be completed.          Additional Information About Your Visit        MyChart Information     [facility] gives you secure access to your electronic health record. If you see a primary care provider, you can also send messages to your care team and make appointments. If you have questions, please call your primary care clinic.  If you do not have a primary care provider, please call [phone] and they will assist you.        Care EveryWhere ID     This is your Care EveryWhere ID. This could be used by other organizations to access your [place] medical records  [id number]        Your Vitals Were     Pulse Temperature BMI (Body Mass Index)             98 98.5  F (36.9  C) (Oral) 37.31 kg/m2          Blood Pressure from Last 3 Encounters:   08/28/17 127/77   08/14/17 130/78    04/10/17 (!) 145/101    Weight from Last 3 Encounters:   08/28/17 204 lb (92.5 kg)   08/14/17 202 lb (91.6 kg)   03/14/17 206 lb (93.4 kg)              Today, you had the following     No orders found for display         Today's Medication Changes          These changes are accurate as of: 8/28/17  5:09 PM.  If you have any questions, ask your nurse or doctor.               Start taking these medicines.        Dose/Directions    methylPREDNISolone 4 MG tablet   Commonly known as:  MEDROL DOSEPAK   Used for:  Dysfunction of eustachian tube, bilateral, Chronic seasonal allergic rhinitis, unspecified trigger   Started by:  Bimal Pearl PA-C        Follow package instructions   Quantity:  21 tablet   Refills:  0            Where to get your medicines      These medications were sent to [facility] [address] [facility] [address] AT [facility] OF [address]  [address], [facility] [address]     Phone:  [phone]     methylPREDNISolone 4 MG tablet                Primary Care Provider Office Phone # Fax #    Aurora Youssef [phone] [address]   [facility] [address]        Equal Access to Services     RENE JAMES : Hadii mary kelsey hadasho Soomaali, waaxda luqadaha, qaybta kaalmada adeegyada, gladis john. So [facility] [phone].    ATENCIÓN: Si habla español, tiene a balderas disposición servicios gratuitos de asistencia lingüística. Llame al [phone].    We comply with applicable federal civil rights laws and [place] laws. We do not discriminate on the basis of race, color, national origin, age, disability sex, sexual orientation or gender identity.            Thank you!     Thank you for choosing [facility]  for your care. Our goal is always to provide you with excellent care. Hearing back from our patients is one way we can continue to improve our services. Please take a few minutes to complete  the written survey that you may receive in the mail after your visit with us. Thank you!             Your Updated Medication List - Protect others around you: Learn how to safely use, store and throw away your medicines at www.disposemymeds.org.          This list is accurate as of: 8/28/17  5:09 PM.  Always use your most recent med list.                   Brand Name Dispense Instructions for use Diagnosis    * albuterol 108 (90 BASE) MCG/ACT Inhaler    PROAIR HFA/PROVENTIL HFA/VENTOLIN HFA    1 Inhaler    Inhale 2 puffs into the lungs every 6 hours as needed for shortness of breath / dyspnea or wheezing    Mild intermittent asthma without complication       * albuterol 108 (90 BASE) MCG/ACT Inhaler    PROAIR HFA/PROVENTIL HFA/VENTOLIN HFA    1 Inhaler    Inhale 2 puffs into the lungs every 6 hours as needed for shortness of breath / dyspnea or wheezing    Viral URI with cough       CLARITIN 10 MG tablet   Generic drug:  loratadine      Take 1 tablet (10 mg) by mouth daily        EPINEPHrine 0.3 MG/0.3ML injection 2-pack    EPIPEN/ADRENACLICK/or ANY BX GENERIC EQUIV     Inject 0.3 mLs (0.3 mg) into the muscle once as needed for anaphylaxis    Bee sting-induced anaphylaxis, accidental or unintentional, sequela       fexofenadine-pseudoePHEDrine 180-240 MG per 24 hr tablet    ALLEGRA-D 24    14 tablet    Take 1 tablet by mouth daily    Viral URI with cough       fluticasone 50 MCG/ACT spray    FLONASE    1 Bottle    Spray 1-2 sprays into both nostrils daily    Environmental allergies       medroxyPROGESTERone 150 MG/ML injection    DEPO-PROVERA    1 mL    Inject 1 mL (150 mg) into the muscle every 3 months    Depo-Provera contraceptive status, Encounter for initial prescription of injectable contraceptive       methylPREDNISolone 4 MG tablet    MEDROL DOSEPAK    21 tablet    Follow package instructions    Dysfunction of eustachian tube, bilateral, Chronic seasonal allergic rhinitis, unspecified trigger       * Notice:   This list has 2 medication(s) that are the same as other medications prescribed for you. Read the directions carefully, and ask your doctor or other care provider to review them with you.

## 2017-08-29 ASSESSMENT — ASTHMA QUESTIONNAIRES: ACT_TOTALSCORE: 11

## 2017-10-20 ENCOUNTER — ALLIED HEALTH/NURSE VISIT (OUTPATIENT)
Dept: NURSING | Facility: CLINIC | Age: 27
End: 2017-10-20
Payer: COMMERCIAL

## 2017-10-20 DIAGNOSIS — Z30.9 CONTRACEPTIVE MANAGEMENT: Primary | ICD-10-CM

## 2017-10-20 LAB — BETA HCG QUAL IFA URINE: NEGATIVE

## 2017-10-20 PROCEDURE — 99207 ZZC NO CHARGE NURSE ONLY: CPT

## 2017-10-20 PROCEDURE — 96372 THER/PROPH/DIAG INJ SC/IM: CPT

## 2017-10-20 PROCEDURE — 84703 CHORIONIC GONADOTROPIN ASSAY: CPT | Performed by: FAMILY MEDICINE

## 2017-10-20 NOTE — MR AVS SNAPSHOT
After Visit Summary   10/20/2017    Michelle Joshua    MRN: [id number]           Patient Information     Date Of Birth          1990        Visit Information        Provider Department      10/20/2017 3:45 PM UP NURSE [facility]        Today's Diagnoses     Contraceptive management    -  1       Follow-ups after your visit        Who to contact     If you have questions or need follow up information about today's clinic visit or your schedule please contact [facility] directly at [phone].  Normal or non-critical lab and imaging results will be communicated to you by [employer], letter or phone within 4 business days after the clinic has received the results. If you do not hear from us within 7 days, please contact the clinic through [employer] or phone. If you have a critical or abnormal lab result, we will notify you by phone as soon as possible.  Submit refill requests through [employer] or call your pharmacy and they will forward the refill request to us. Please allow 3 business days for your refill to be completed.          Additional Information About Your Visit        MyChart Information     [employer] gives you secure access to your electronic health record. If you see a primary care provider, you can also send messages to your care team and make appointments. If you have questions, please call your primary care clinic.  If you do not have a primary care provider, please call [phone] and they will assist you.        Care EveryWhere ID     This is your Care EveryWhere ID. This could be used by other organizations to access your [facility] medical records  [id number]         Blood Pressure from Last 3 Encounters:   08/28/17 127/77   08/14/17 130/78   04/10/17 (!) 145/101    Weight from Last 3 Encounters:   08/28/17 204 lb (92.5 kg)   08/14/17 202 lb (91.6 kg)   03/14/17 206 lb (93.4 kg)              We Performed the Following     Beta HCG qual IFA urine - [facility] and [facility]  Sergio        Primary Care Provider Office Phone # Fax #    Aurora Youssef [phone] [address] [address]        Equal Access to Services     RENE JAMES : Haddori mary kelsey tereo Sokeriali, waaxda luqadaha, qaybta kaalmada adeegyada, gladis box laChanceaisha john. So [facility] [phone].    ATENCIÓN: Si habla español, tiene a balderas disposición servicios gratuitos de asistencia lingüística. Llame al [phone].    We comply with applicable federal civil rights laws and [place] laws. We do not discriminate on the basis of race, color, national origin, age, disability, sex, sexual orientation, or gender identity.            Thank you!     Thank you for choosing [facility]URSE  for your care. Our goal is always to provide you with excellent care. Hearing back from our patients is one way we can continue to improve our services. Please take a few minutes to complete the written survey that you may receive in the mail after your visit with us. Thank you!             Your Updated Medication List - Protect others around you: Learn how to safely use, store and throw away your medicines at www.disposemymeds.org.          This list is accurate as of: 10/20/17 11:59 PM.  Always use your most recent med list.                   Brand Name Dispense Instructions for use Diagnosis    * albuterol 108 (90 BASE) MCG/ACT Inhaler    PROAIR HFA/PROVENTIL HFA/VENTOLIN HFA    1 Inhaler    Inhale 2 puffs into the lungs every 6 hours as needed for shortness of breath / dyspnea or wheezing    Mild intermittent asthma without complication       * albuterol 108 (90 BASE) MCG/ACT Inhaler    PROAIR HFA/PROVENTIL HFA/VENTOLIN HFA    1 Inhaler    Inhale 2 puffs into the lungs every 6 hours as needed for shortness of breath / dyspnea or wheezing    Viral URI with cough       CLARITIN 10 MG tablet   Generic drug:  loratadine      Take 1 tablet (10 mg) by mouth daily        EPINEPHrine  0.3 MG/0.3ML injection 2-pack    EPIPEN/ADRENACLICK/or ANY BX GENERIC EQUIV     Inject 0.3 mLs (0.3 mg) into the muscle once as needed for anaphylaxis    Bee sting-induced anaphylaxis, accidental or unintentional, sequela       fexofenadine-pseudoePHEDrine 180-240 MG per 24 hr tablet    ALLEGRA-D 24    14 tablet    Take 1 tablet by mouth daily    Viral URI with cough       fluticasone 50 MCG/ACT spray    FLONASE    1 Bottle    Spray 1-2 sprays into both nostrils daily    Environmental allergies       medroxyPROGESTERone 150 MG/ML injection    DEPO-PROVERA    1 mL    Inject 1 mL (150 mg) into the muscle every 3 months    Depo-Provera contraceptive status, Encounter for initial prescription of injectable contraceptive       methylPREDNISolone 4 MG tablet    MEDROL DOSEPAK    21 tablet    Follow package instructions    Dysfunction of Eustachian tube, bilateral, Chronic seasonal allergic rhinitis, unspecified trigger       * Notice:  This list has 2 medication(s) that are the same as other medications prescribed for you. Read the directions carefully, and ask your doctor or other care provider to review them with you.

## 2018-01-15 ENCOUNTER — ALLIED HEALTH/NURSE VISIT (OUTPATIENT)
Dept: NURSING | Facility: CLINIC | Age: 28
End: 2018-01-15
Payer: COMMERCIAL

## 2018-01-15 PROCEDURE — 96372 THER/PROPH/DIAG INJ SC/IM: CPT

## 2018-01-15 NOTE — NURSING NOTE
"Chief Complaint   Patient presents with     Allied Health Visit     Imm/Inj     Depo injection      There were no vitals taken for this visit. Estimated body mass index is 37.31 kg/(m^2) as calculated from the following:    Height as of 8/14/17: 5' 2\" (1.575 m).    Weight as of 8/28/17: 204 lb (92.5 kg).  bp completed using cuff size: NA (Not Taken)       Health Maintenance addressed:  NONE    n/a    Nancy Rangel MA     "

## 2018-01-15 NOTE — PROGRESS NOTES
The following medication was given:     MEDICATION: Depo Provera 150mg  ROUTE: IM  SITE: Deltoid - Left  DOSE: 1 ML/ 150MG  LOT #: [id number]  :  [employer]   EXPIRATION DATE:  04/2019  NDC#: 3317-7332-29  Next one due 04/02/2018-04/16/2018   Card was given to pt    BP: Data Unavailable    LAST PAP/EXAM:   Lab Results   Component Value Date    PAP NIL 09/08/2015     URINE HCG:not indicated

## 2018-01-15 NOTE — MR AVS SNAPSHOT
After Visit Summary   1/15/2018    Michelle Joshua    MRN: [id number]           Patient Information     Date Of Birth          1990        Visit Information        Provider Department      1/15/2018 1:30 PM UP NURSE [facility]        Today's Diagnoses     Contraception    -  1       Follow-ups after your visit        Who to contact     If you have questions or need follow up information about today's clinic visit or your schedule please contact [facility] directly at [phone].  Normal or non-critical lab and imaging results will be communicated to you by MyChart, letter or phone within 4 business days after the clinic has received the results. If you do not hear from us within 7 days, please contact the clinic through [employer] or phone. If you have a critical or abnormal lab result, we will notify you by phone as soon as possible.  Submit refill requests through [employer] or call your pharmacy and they will forward the refill request to us. Please allow 3 business days for your refill to be completed.          Additional Information About Your Visit        MyChart Information     [employer] gives you secure access to your electronic health record. If you see a primary care provider, you can also send messages to your care team and make appointments. If you have questions, please call your primary care clinic.  If you do not have a primary care provider, please call [phone] and they will assist you.        Care EveryWhere ID     This is your Care EveryWhere ID. This could be used by other organizations to access your [facility] medical records  [id number]         Blood Pressure from Last 3 Encounters:   08/28/17 127/77   08/14/17 130/78   04/10/17 (!) 145/101    Weight from Last 3 Encounters:   08/28/17 204 lb (92.5 kg)   08/14/17 202 lb (91.6 kg)   03/14/17 206 lb (93.4 kg)              We Performed the Following     INJECTION INTRAMUSCULAR OR SUB-Q      Medroxyprogesterone inj  1mg   (Depo Provera J-Code)        Primary Care Provider Office Phone # Fax #    Aurora Youssef [phone]       [facility] [address]        Equal Access to Services     Sharp Mesa VistaMARGARITA : Hadii aad ku hadasho Soomaali, waaxda luqadaha, qaybta kaalmada adeegyada, waxay idiin hayaan adeblossom box lashiva . So [facility] [phone].    ATENCIÓN: Si habla español, tiene a balderas disposición servicios gratuitos de asistencia lingüística. Ainsleyvadim al [phone].    We comply with applicable federal civil rights laws and [place] laws. We do not discriminate on the basis of race, color, national origin, age, disability, sex, sexual orientation, or gender identity.            Thank you!     Thank you for choosing [facility] NURSE  for your care. Our goal is always to provide you with excellent care. Hearing back from our patients is one way we can continue to improve our services. Please take a few minutes to complete the written survey that you may receive in the mail after your visit with us. Thank you!             Your Updated Medication List - Protect others around you: Learn how to safely use, store and throw away your medicines at www.disposemymeds.org.          This list is accurate as of: 1/15/18  1:58 PM.  Always use your most recent med list.                   Brand Name Dispense Instructions for use Diagnosis    * albuterol 108 (90 BASE) MCG/ACT Inhaler    PROAIR HFA/PROVENTIL HFA/VENTOLIN HFA    1 Inhaler    Inhale 2 puffs into the lungs every 6 hours as needed for shortness of breath / dyspnea or wheezing    Mild intermittent asthma without complication       * albuterol 108 (90 BASE) MCG/ACT Inhaler    PROAIR HFA/PROVENTIL HFA/VENTOLIN HFA    1 Inhaler    Inhale 2 puffs into the lungs every 6 hours as needed for shortness of breath / dyspnea or wheezing    Viral URI with cough       CLARITIN 10 MG tablet   Generic drug:  loratadine      Take 1  tablet (10 mg) by mouth daily        EPINEPHrine 0.3 MG/0.3ML injection 2-pack    EPIPEN/ADRENACLICK/or ANY BX GENERIC EQUIV     Inject 0.3 mLs (0.3 mg) into the muscle once as needed for anaphylaxis    Bee sting-induced anaphylaxis, accidental or unintentional, sequela       fexofenadine-pseudoePHEDrine 180-240 MG per 24 hr tablet    ALLEGRA-D 24    14 tablet    Take 1 tablet by mouth daily    Viral URI with cough       fluticasone 50 MCG/ACT spray    FLONASE    1 Bottle    Spray 1-2 sprays into both nostrils daily    Environmental allergies       medroxyPROGESTERone 150 MG/ML injection    DEPO-PROVERA    1 mL    Inject 1 mL (150 mg) into the muscle every 3 months    Depo-Provera contraceptive status, Encounter for initial prescription of injectable contraceptive       methylPREDNISolone 4 MG tablet    MEDROL DOSEPAK    21 tablet    Follow package instructions    Dysfunction of Eustachian tube, bilateral, Chronic seasonal allergic rhinitis, unspecified trigger       * Notice:  This list has 2 medication(s) that are the same as other medications prescribed for you. Read the directions carefully, and ask your doctor or other care provider to review them with you.

## 2018-04-18 ENCOUNTER — ALLIED HEALTH/NURSE VISIT (OUTPATIENT)
Dept: NURSING | Facility: CLINIC | Age: 28
End: 2018-04-18
Payer: COMMERCIAL

## 2018-04-18 LAB — BETA HCG QUAL IFA URINE: NEGATIVE

## 2018-04-18 PROCEDURE — 96372 THER/PROPH/DIAG INJ SC/IM: CPT

## 2018-04-18 PROCEDURE — 84703 CHORIONIC GONADOTROPIN ASSAY: CPT | Performed by: FAMILY MEDICINE

## 2018-04-18 PROCEDURE — 99207 ZZC NO CHARGE NURSE ONLY: CPT

## 2018-05-02 DIAGNOSIS — J45.20 MILD INTERMITTENT ASTHMA WITHOUT COMPLICATION: ICD-10-CM

## 2018-05-02 NOTE — TELEPHONE ENCOUNTER
Last seen for Asthma 2/2/17.  Needs an OV  Chart states LS is PCP but LS has never seen patient.  Seen 8/2017 for acute illness only    VM left asking patient to call and schedule appointment for Asthma follow up.  Kiah Weber RN

## 2018-05-02 NOTE — TELEPHONE ENCOUNTER
Reason for Call:  Medication or medication refill:    Do you use a [facility]?  Name of the pharmacy and phone number for the current request:  [facility] [id number] [facility] [address] AT [address] [place]    Name of the medication requested: albuterol (PROAIR HFA/PROVENTIL HFA/VENTOLIN HFA), EPINEPHrine (EPIPEN/ADRENACLICK/OR ANY BX GENERIC EQUIV), fluticasone (FLONASE), loratadine (CLARITIN)    Other request: patient said she she had trouble requesting through pharmacy as some were from Dr Prabhakar.     Can we leave a detailed message on this number? YES    Phone number patient can be reached at: Home number on file [phone] (home)    Best Time: any    Call taken on 5/2/2018 at 2:28 PM by Genie Norman

## 2018-05-07 DIAGNOSIS — J45.20 MILD INTERMITTENT ASTHMA WITHOUT COMPLICATION: ICD-10-CM

## 2018-05-08 RX ORDER — ALBUTEROL SULFATE 90 UG/1
AEROSOL, METERED RESPIRATORY (INHALATION)
Start: 2018-05-08

## 2018-05-08 RX ORDER — ALBUTEROL SULFATE 90 UG/1
2 AEROSOL, METERED RESPIRATORY (INHALATION) EVERY 6 HOURS PRN
Qty: 1 INHALER | Refills: 0 | Status: SHIPPED | OUTPATIENT
Start: 2018-05-08 | End: 2018-11-14

## 2018-05-08 NOTE — TELEPHONE ENCOUNTER
"Duplicate  See refill TE from 5/2/2018  Yolanda SANDOVAL RN    Requested Prescriptions   Pending Prescriptions Disp Refills     VENTOLIN  (90 Base) MCG/ACT Inhaler [Pharmacy Med Name: VENTOLIN HFA INH W/DOS CTR 200PUFFS] 18 g 0     Sig: INHALE 2 PUFFS INTO THE LUNGS EVERY 6 HOURS AS NEEDED FOR SHORTNESS OF BREATH OR DIFFICULT BREATHING OR WHEEZING    Asthma Maintenance Inhalers - Anticholinergics Failed    5/7/2018  4:01 PM       Failed - Asthma control assessment score within normal limits in last 6 months    Please review ACT score.          Failed - Recent (6 mo) or future (30 days) visit within the authorizing provider's specialty    Patient had office visit in the last 6 months or has a visit in the next 30 days with authorizing provider or within the authorizing provider's specialty.  See \"Patient Info\" tab in inbasket, or \"Choose Columns\" in Meds & Orders section of the refill encounter.           Passed - Patient is age 12 years or older            "

## 2018-05-08 NOTE — TELEPHONE ENCOUNTER
LS,   Left VM #2 for patient  See below messages  No response from patient to our outreach  Please advise on further refill  Thanks,  Yolanda SANDOVAL RN

## 2018-06-04 DIAGNOSIS — Z91.09 ENVIRONMENTAL ALLERGIES: ICD-10-CM

## 2018-06-04 DIAGNOSIS — T78.2XXS BEE STING-INDUCED ANAPHYLAXIS, ACCIDENTAL OR UNINTENTIONAL, SEQUELA: ICD-10-CM

## 2018-06-04 DIAGNOSIS — T63.441S BEE STING-INDUCED ANAPHYLAXIS, ACCIDENTAL OR UNINTENTIONAL, SEQUELA: ICD-10-CM

## 2018-06-04 NOTE — TELEPHONE ENCOUNTER
"Requested Prescriptions   Pending Prescriptions Disp Refills     fluticasone (FLONASE) 50 MCG/ACT spray [Pharmacy Med Name: FLUTICASONE 50MCG NASAL SP (120) RX] 16 mL 0     Sig: SHAKE LIQUID AND USE 1 TO 2 SPRAYS IN EACH NOSTRIL DAILY    Inhaled Steroids Protocol Failed    6/4/2018  9:00 AM       Failed - Asthma control assessment score within normal limits in last 6 months    Please review ACT score.          Failed - Recent (6 mo) or future (30 days) visit within the authorizing provider's specialty    Patient had office visit in the last 6 months or has a visit in the next 30 days with authorizing provider or within the authorizing provider's specialty.  See \"Patient Info\" tab in inbasket, or \"Choose Columns\" in Meds & Orders section of the refill encounter.           Passed - Patient is age 12 or older        "

## 2018-06-04 NOTE — TELEPHONE ENCOUNTER
Looks like former MP patient even though PCP listed as LS  Patient has not seen LS since 9/24/2013  Sent MyChart to pt advising appt with new PCP  Yolanda SANDOVAL RN

## 2018-06-08 RX ORDER — EPINEPHRINE 0.3 MG/.3ML
0.3 INJECTION SUBCUTANEOUS
Qty: 0.6 ML | Refills: 0 | Status: SHIPPED | OUTPATIENT
Start: 2018-06-08 | End: 2018-11-14

## 2018-06-08 RX ORDER — FLUTICASONE PROPIONATE 50 MCG
SPRAY, SUSPENSION (ML) NASAL
Start: 2018-06-08

## 2018-06-08 RX ORDER — LORATADINE 10 MG/1
10 TABLET ORAL DAILY
Qty: 30 TABLET | Refills: 0 | Status: SHIPPED | OUTPATIENT
Start: 2018-06-08 | End: 2018-06-12

## 2018-06-08 RX ORDER — FLUTICASONE PROPIONATE 50 MCG
1-2 SPRAY, SUSPENSION (ML) NASAL DAILY
Qty: 1 BOTTLE | Refills: 0 | Status: SHIPPED | OUTPATIENT
Start: 2018-06-08 | End: 2018-11-14

## 2018-06-08 NOTE — TELEPHONE ENCOUNTER
"JS,   Please advise in LS' absence  Called pt and advised OV  She's upset we won't refill her \"life saving allergy medication\"  Told her we only received request for Flonase - can buy OTC  States she also requested her EpiPen and Claritin    Said LS is her PCP and she sees her all the time  Last OV years ago   States she sees LS because her kids all see LS    Next 5 appointments (look out 90 days)     Jun 12, 2018 12:30 PM CDT   Office Visit with Aurora Youssef MD   [facility]    [address] [facility] [address] [phone]                Is out of her medication in the meantime  Absolutely needs her EpiPen today she said  Pended 30 day refills  EpiPen historical - last one from  in 2015 per med history    Please authorize if appropriate.  Thanks,  Yolanda SANDOVAL, RN    Triage: patient wants callback when Rx's sent          "

## 2018-06-08 NOTE — TELEPHONE ENCOUNTER
VM left asking patient to call clinic.  Needs to establish care with new PCP.  Pharmacy informed  Kiah Weber RN

## 2018-06-12 ENCOUNTER — APPOINTMENT (OUTPATIENT)
Dept: GENERAL RADIOLOGY | Facility: CLINIC | Age: 28
End: 2018-06-12
Attending: EMERGENCY MEDICINE
Payer: COMMERCIAL

## 2018-06-12 ENCOUNTER — OFFICE VISIT (OUTPATIENT)
Dept: FAMILY MEDICINE | Facility: CLINIC | Age: 28
End: 2018-06-12
Payer: COMMERCIAL

## 2018-06-12 ENCOUNTER — HOSPITAL ENCOUNTER (EMERGENCY)
Facility: CLINIC | Age: 28
Discharge: HOME | End: 2018-06-12
Attending: EMERGENCY MEDICINE | Admitting: EMERGENCY MEDICINE
Payer: COMMERCIAL

## 2018-06-12 VITALS
BODY MASS INDEX: 35.15 KG/M2 | TEMPERATURE: 98.8 F | DIASTOLIC BLOOD PRESSURE: 89 MMHG | HEIGHT: 62 IN | WEIGHT: 191 LBS | OXYGEN SATURATION: 98 % | SYSTOLIC BLOOD PRESSURE: 138 MMHG | HEART RATE: 90 BPM

## 2018-06-12 VITALS
TEMPERATURE: 98.7 F | SYSTOLIC BLOOD PRESSURE: 138 MMHG | OXYGEN SATURATION: 99 % | RESPIRATION RATE: 16 BRPM | DIASTOLIC BLOOD PRESSURE: 76 MMHG | HEART RATE: 88 BPM

## 2018-06-12 DIAGNOSIS — S99.911D INJURY OF RIGHT ANKLE, SUBSEQUENT ENCOUNTER: ICD-10-CM

## 2018-06-12 DIAGNOSIS — S93.401D SPRAIN OF RIGHT ANKLE, UNSPECIFIED LIGAMENT, SUBSEQUENT ENCOUNTER: Primary | ICD-10-CM

## 2018-06-12 DIAGNOSIS — S93.401A SPRAIN OF RIGHT ANKLE, UNSPECIFIED LIGAMENT, INITIAL ENCOUNTER: ICD-10-CM

## 2018-06-12 PROCEDURE — 29515 APPLICATION SHORT LEG SPLINT: CPT | Mod: RT | Performed by: EMERGENCY MEDICINE

## 2018-06-12 PROCEDURE — 73610 X-RAY EXAM OF ANKLE: CPT | Mod: RT

## 2018-06-12 PROCEDURE — 25000132 ZZH RX MED GY IP 250 OP 250 PS 637: Performed by: EMERGENCY MEDICINE

## 2018-06-12 PROCEDURE — 99284 EMERGENCY DEPT VISIT MOD MDM: CPT | Performed by: EMERGENCY MEDICINE

## 2018-06-12 PROCEDURE — 99214 OFFICE O/P EST MOD 30 MIN: CPT | Performed by: FAMILY MEDICINE

## 2018-06-12 PROCEDURE — 99283 EMERGENCY DEPT VISIT LOW MDM: CPT | Mod: Z6 | Performed by: EMERGENCY MEDICINE

## 2018-06-12 RX ORDER — NAPROXEN 500 MG/1
500 TABLET ORAL 2 TIMES DAILY WITH MEALS
Qty: 60 TABLET | Refills: 0 | Status: SHIPPED | OUTPATIENT
Start: 2018-06-12 | End: 2020-10-15

## 2018-06-12 RX ORDER — HYDROCODONE BITARTRATE AND ACETAMINOPHEN 5; 325 MG/1; MG/1
2 TABLET ORAL ONCE
Status: COMPLETED | OUTPATIENT
Start: 2018-06-12 | End: 2018-06-12

## 2018-06-12 RX ADMIN — HYDROCODONE BITARTRATE AND ACETAMINOPHEN 2 TABLET: 5; 325 TABLET ORAL at 07:46

## 2018-06-12 ASSESSMENT — ENCOUNTER SYMPTOMS
SHORTNESS OF BREATH: 0
VOMITING: 0
ABDOMINAL PAIN: 0
FEVER: 0
COUGH: 0

## 2018-06-12 ASSESSMENT — ANXIETY QUESTIONNAIRES
2. NOT BEING ABLE TO STOP OR CONTROL WORRYING: NOT AT ALL
GAD7 TOTAL SCORE: 1
IF YOU CHECKED OFF ANY PROBLEMS ON THIS QUESTIONNAIRE, HOW DIFFICULT HAVE THESE PROBLEMS MADE IT FOR YOU TO DO YOUR WORK, TAKE CARE OF THINGS AT HOME, OR GET ALONG WITH OTHER PEOPLE: NOT DIFFICULT AT ALL
5. BEING SO RESTLESS THAT IT IS HARD TO SIT STILL: NOT AT ALL
6. BECOMING EASILY ANNOYED OR IRRITABLE: NOT AT ALL
1. FEELING NERVOUS, ANXIOUS, OR ON EDGE: NOT AT ALL
7. FEELING AFRAID AS IF SOMETHING AWFUL MIGHT HAPPEN: NOT AT ALL
3. WORRYING TOO MUCH ABOUT DIFFERENT THINGS: SEVERAL DAYS

## 2018-06-12 ASSESSMENT — PATIENT HEALTH QUESTIONNAIRE - PHQ9: 5. POOR APPETITE OR OVEREATING: NOT AT ALL

## 2018-06-12 NOTE — ED AVS SNAPSHOT
[facility], [place], Emergency Department    [address]    [facility] [address]    Phone:  [phone]    Fax:  [phone]                                       Michelle Joshua   MRN: [id number]    Department:  [facility], Emergency Department   Date of Visit:  6/12/2018           After Visit Summary Signature Page     I have received my discharge instructions, and my questions have been answered. I have discussed any challenges I see with this plan with the nurse or doctor.    ..........................................................................................................................................  Patient/Patient Representative Signature      ..........................................................................................................................................  Patient Representative Print Name and Relationship to Patient    ..................................................               ................................................  Date                                            Time    ..........................................................................................................................................  Reviewed by Signature/Title    ...................................................              ..............................................  Date                                                            Time

## 2018-06-12 NOTE — PROGRESS NOTES
SUBJECTIVE:   Michelle Joshua is a 27 year old female who presents to clinic today for the following health issues:      ED/UC Followup:    Facility:  [facility] ED  Date of visit:   Reason for visit: ankle injury - RT ankle/foot swollen and bruised   Current Status: sprained RT ankle according to x-ray done in ED - ankle is still swollen and bruised, unable to bear weight on foot             Problem list and histories reviewed & adjusted, as indicated.  Additional history: as documented    Patient Active Problem List   Diagnosis     Allergic rhinitis     Other acne     CARDIOVASCULAR SCREENING; LDL GOAL LESS THAN 160     Nicotine addiction     Bee sting-induced anaphylaxis     Bunion of left foot     Mild intermittent asthma     Obesity     Hip pain, chronic     Myalgia and myositis     Supervision of other normal pregnancy, antepartum     Need for Tdap vaccination     Encounter for triage in pregnant patient     Group B Streptococcus carrier, +RV culture, currently pregnant     Pregnancy      (spontaneous vaginal delivery)     Contraception     Shoulder pain     Past Surgical History:   Procedure Laterality Date     HC CREATE EARDRUM OPENING,GEN ANESTH      P.E. Tubes     HC REMOVE TONSILS/ADENOIDS,<13 Y/O      T & A <11y.o.       Social History   Substance Use Topics     Smoking status: Current Every Day Smoker     Packs/day: 0.50     Types: Cigarettes     Smokeless tobacco: Never Used      Comment: sometimes     Alcohol use No     Family History   Problem Relation Age of Onset     Hypertension Mother          Current Outpatient Prescriptions   Medication Sig Dispense Refill     albuterol (PROAIR HFA/PROVENTIL HFA/VENTOLIN HFA) 108 (90 Base) MCG/ACT Inhaler Inhale 2 puffs into the lungs every 6 hours as needed for shortness of breath / dyspnea or wheezing 1 Inhaler 0     EPINEPHrine (EPIPEN/ADRENACLICK/OR ANY BX GENERIC EQUIV) 0.3 MG/0.3ML injection 2-pack Inject 0.3 mLs (0.3 mg) into the  muscle once as needed for anaphylaxis 0.6 mL 0     fexofenadine-pseudoePHEDrine (ALLEGRA-D 24) 180-240 MG per 24 hr tablet Take 1 tablet by mouth daily 14 tablet 0     medroxyPROGESTERone (DEPO-PROVERA) 150 MG/ML injection Inject 1 mL (150 mg) into the muscle every 3 months 1 mL 1     naproxen (NAPROSYN) 500 MG tablet Take 1 tablet (500 mg) by mouth 2 times daily (with meals) 60 tablet 0     albuterol (PROAIR HFA/PROVENTIL HFA/VENTOLIN HFA) 108 (90 BASE) MCG/ACT Inhaler Inhale 2 puffs into the lungs every 6 hours as needed for shortness of breath / dyspnea or wheezing 1 Inhaler 1     fluticasone (FLONASE) 50 MCG/ACT spray Spray 1-2 sprays into both nostrils daily 1 Bottle 0     [DISCONTINUED] fluticasone (FLOVENT DISKUS) 100 MCG/BLIST AEPB Inhale 1 puff (100 mcg) into the lungs 2 times daily 1 each 3     Allergies   Allergen Reactions     Amoxicillin Hives     Bees      Penicillins      Sulfa Drugs      Recent Labs   Lab Test  01/31/17 2042 01/31/17 2031  11/15/16   1123  11/10/14   1045   05/18/10   1250   ALT   --   58*  20   --    --   <6   CR   --   0.66  0.72   --    < >  0.50*   GFRESTIMATED  >90  >90  Non African American GFR Calc    >90  Non  GFR Calc     --    < >  >90   GFRESTBLACK  >90  >90  African American GFR Calc    >90   GFR Calc     --    < >  >90   POTASSIUM   --   3.6  4.1   --    < >  3.5   TSH   --    --    --   0.76   --    --     < > = values in this interval not displayed.      BP Readings from Last 3 Encounters:   06/12/18 138/89   06/12/18 138/76   08/28/17 127/77    Wt Readings from Last 3 Encounters:   06/12/18 191 lb (86.6 kg)   08/28/17 204 lb (92.5 kg)   08/14/17 202 lb (91.6 kg)                  Labs reviewed in EPIC    Reviewed and updated as needed this visit by clinical staff       Reviewed and updated as needed this visit by Provider         ROS:  Constitutional, HEENT, cardiovascular, pulmonary, GI, , musculoskeletal, neuro, skin, endocrine  "and psych systems are negative, except as otherwise noted.    OBJECTIVE:     /89  Pulse 90  Temp 98.8  F (37.1  C) (Oral)  Ht 5' 2\" (1.575 m)  Wt 191 lb (86.6 kg)  SpO2 98%  BMI 34.93 kg/m2  Body mass index is 34.93 kg/(m^2).  GENERAL: healthy, alert and no distress  EYES: Eyes grossly normal to inspection, PERRL and conjunctivae and sclerae normal  NECK: no adenopathy, no asymmetry, masses, or scars and thyroid normal to palpation  MS: no gross musculoskeletal defects noted, EXCEPT there is tenderness with even slight palpation over the lateral right foot , under the malleolus , no erythema but edema and pt has pain with weight bearing , not able to do the dorsiflexion without significant amount of pain .    Diagnostic Test Results:  none     ASSESSMENT/PLAN:             1. Sprain of right ankle, unspecified ligament, subsequent encounter  Since she just got out of the ER and her X ray was done four hrs ago , I do not think that repeating the X ray would be of any use at this time. Discussed ice , using the crutches , and the gel ankle brace they have provided her , I have given her a referral for Ortho as below. She will use the Naproxen 500mg twice a day with food, elevate the leg, ice it a few x a day total of 15 min at a time.    2. Injury of right ankle, subsequent encounter  As above -RTC if no improving or worsening.    - ORTHOPEDICS ADULT REFERRAL    Pt is aware  and comfortable with the current plan.      Aurora Youssef MD  [facility]  "

## 2018-06-12 NOTE — ED PROVIDER NOTES
History     Chief Complaint   Patient presents with     Ankle Pain     slipped on a wet floor last josé miguel, right ankle painful on the lateral aspect     HPI  Michelle Joshua is a 27 year old female who presents for right ankle pain.  Patient reports that she slipped on a wet floor while barefoot last night.  She believes that she inverted her right ankle.  Patient denies any other injury such as head injury, neck or back injury, or other injury or pain.  She denies knee pain.  Pain is over the lateral aspect of the right ankle.  She has been unable to walk since this happened last night.  This happened almost 12 hours ago.  She reports some paresthesias in the toes of the right foot but denies numbness or weakness.  Patient otherwise denies any associated dizziness, chest pain, shortness of breath prior to the fall.  She has not tried any pain medicine at home.    I have reviewed the Medications, Allergies, Past Medical and Surgical History, and Social History in the [facility] system.  Past Medical History:   Diagnosis Date     Uncomplicated asthma      Unspecified otitis media     Otitis Media as a child       Past Surgical History:   Procedure Laterality Date     HC CREATE EARDRUM OPENING,GEN ANESTH      P.E. Tubes     HC REMOVE TONSILS/ADENOIDS,<10 Y/O      T & A <11y.o.       Family History   Problem Relation Age of Onset     Hypertension Mother        Social History   Substance Use Topics     Smoking status: Current Every Day Smoker     Packs/day: 0.50     Types: Cigarettes     Smokeless tobacco: Never Used      Comment: sometimes     Alcohol use No         Review of Systems   Constitutional: Negative for fever.   Respiratory: Negative for cough and shortness of breath.    Cardiovascular: Negative for chest pain.   Gastrointestinal: Negative for abdominal pain and vomiting.   Musculoskeletal:        See HPI   All other systems reviewed and are negative.      Physical Exam   BP: 149/85  Pulse: 103  Temp: 98.7  F  (37.1  C)  Resp: 16  Weight:  (unable pt can't stand)  SpO2: 99 %      Physical Exam    GEN:  Alert, well developed, no acute distress  HEENT:  Atraumatic, mucous membranes are moist.   Vascuular: Normal dorsalis pedis pulse in the right foot  Back exam: No C-spine, T-spine, L-spine tenderness, no CVA tenderness  Musculoskeletal: There is swelling over the lateral aspect of the right ankle.  There is tenderness over the distal fibula and lateral malleolus, range of motion at the right ankle is limited by pain.  No ecchymosis.  There is no decreased range of motion at the right knee and no tenderness over the proximal fibula.  There is no tenderness over the proximal fifth metatarsal.  No tenderness otherwise in the foot.  Neuro:  Alert and oriented X3, Follows commands, normal sensation in the foot and toes of the right foot.  Normal strength in the toes of the right foot.  Skin:  Warm, dry   ED Course     ED Course     Procedures             Critical Care time:  none     Patient was given [place] for pain in the ED.   X-ray of the right ankle was reviewed by me and results are shown here:  Results for orders placed or performed during the hospital encounter of 06/12/18   Ankle XR, G/E 3 views, right    Narrative    XR ANKLE RT G/E 3 VW 6/12/2018 8:03 AM     HISTORY: trauma, pain is over lateral malleolus;     COMPARISON: 11/10/2014      Impression    IMPRESSION: No evidence of fracture. The ankle mortise is congruent.    ANTONIO CAPELLAN MD          Labs Ordered and Resulted from Time of ED Arrival Up to the Time of Departure from the ED - No data to display         Assessments & Plan (with Medical Decision Making)   Patient presents with right ankle pain due to slip and fall at home.  X-rays negative for fracture.  She denies other injury.  Exam is consistent with ankle sprain.  There are no neurovascular deficits.  She was given a gel splint and was advised to use crutches until she is able to bear weight without  pain.  Patient's mother has a pair of crutches and brought them to the emergency department, so the patient will use those until she is able to bear weight.  She was given a work note and was advised to follow-up with her primary care provider in 7-10 days.  Patient works as a  at [employer].      I have reviewed the nursing notes.    I have reviewed the findings, diagnosis, plan and need for follow up with the patient.    New Prescriptions    NAPROXEN (NAPROSYN) 500 MG TABLET    Take 1 tablet (500 mg) by mouth 2 times daily (with meals)       Final diagnoses:   Sprain of right ankle, unspecified ligament, initial encounter       6/12/2018   [facility], [place], EMERGENCY DEPARTMENT     Mar Mirza MD  06/12/18 0816

## 2018-06-12 NOTE — LETTER
June 12, 2018      To Whom It May Concern:      Michelle Joshua was seen in our Emergency Department today, 06/12/18.  I expect her condition to improve over the next 2-3 days.  She may return to work/school when improved.    Sincerely,          Mar Mirza MD

## 2018-06-12 NOTE — ED AVS SNAPSHOT
[facility], Emergency Department    [address]    [place] [address]    Phone:  [phone]    Fax:  [phone]                                       Michelle Joshua   MRN: [id number]    Department:  [facility], Emergency Department   Date of Visit:  6/12/2018           Patient Information     Date Of Birth          1990        Your diagnoses for this visit were:     Sprain of right ankle, unspecified ligament, initial encounter        You were seen by Mar Mirza MD.        Discharge Instructions         Treating Ankle Sprains  Treatment will depend on how bad your sprain is. For a severe sprain, healing may take 3 months or more.  Right after your injury: Use R.I.C.E.    Rest: At first, keep weight off the ankle as much as you can. You may be given crutches to help you walk without putting weight on the ankle.    Ice: Put an ice pack on the ankle for 20 minutes. Remove the pack and wait at least 30 minutes. Repeat for up to 3 days. This helps reduce swelling.    Compression: To reduce swelling and keep the joint stable, you may need to wrap the ankle with an elastic bandage. For more severe sprains, you may need an ankle brace, a boot, or a cast.    Elevation: To reduce swelling, keep your ankle raised above your heart when you sit or lie down.  Medicine  Your healthcare provider may suggest oral nonsteroidal anti-inflammatory medicine (NSAIDs), such as ibuprofen. This relieves the pain and helps reduce swelling. Be sure to take your medicine as directed.  Exercises    After about 2 to 3 weeks, you may be given exercises to strengthen the ligaments and muscles in the ankle. Doing these exercises will help prevent another ankle sprain. Exercises may include standing on your toes and then on your heels and doing ankle curls.  Ankle curls    Sit on the edge of a sturdy table or lie on your back.    Pull your toes toward you. Then point them away from you. Repeat for 2 to 3  minutes.   Date Last Reviewed: 1/1/2018 2000-2017 [employer]. [address] [place] [address], [place] [address]. All rights reserved. This information is not intended as a substitute for professional medical care. Always follow your healthcare professional's instructions.          Understanding Ankle Sprain    The ankle is the joint where the leg and foot meet. Bones are held in place by connective tissue called ligaments. When ankle ligaments are stretched to the point of pain and injury, it is called an ankle sprain. A sprain can tear the ligaments. These tears can be very small but still cause pain. Ankle sprains can be mild or severe.  What causes an ankle sprain?  A sprain may occur when you twist your ankle or bend it too far. This can happen when you stumble or fall. Things that can make an ankle sprain more likely include:    Having had an ankle sprain before    Playing sports that involve running and jumping. Or playing contact sports such as football or hockey.    Wearing shoes that don t support your feet and ankles well    Having ankles with poor strength and flexibility  Symptoms of an ankle sprain  Symptoms may include:    Pain or soreness in the ankle    Swelling    Redness or bruising    Not being able to walk or put weight on the affected foot    Reduced range of motion in the ankle    A popping or tearing feeling at the time the sprain occurs    An abnormal or dislocated look to the ankle    Instability or too much range of motion in the ankle  Treatment for an ankle sprain  Treatment focuses on reducing pain and swelling, and avoiding further injury. Treatments may include:    Resting the ankle. Avoid putting weight on it. This may mean using crutches until the sprain heals.    Prescription or over-the-counter pain medicines. These help reduce swelling and pain.    Cold packs. These help reduce pain and swelling.    Raising your ankle above your heart. This helps reduce  swelling.    Wrapping the ankle with an elastic bandage or ankle brace. This helps reduce swelling and gives some support to the ankle. In rare cases, you may need a cast or boot.    Stretching and other exercises. These improve flexibility and strength.    Heat packs. These may be recommended before doing ankle exercises.  Possible complications of an ankle sprain  An ankle that has been weakened by a sprain can be more likely to have repeated sprains afterward. Doing exercises to strengthen your ankle and improve balance can reduce your risk for repeated sprains. Other possible complications are long-term (chronic) pain or an ankle that remains unstable.  When to call your healthcare provider  Call your healthcare provider right away if you have any of these:    Fever of 100.4 F (38 C) or higher, or as directed    Pain, numbness, discoloration, or coldness in the foot or toes    Pain that gets worse    Symptoms that don t get better, or get worse    New symptoms   Date Last Reviewed: 3/10/2016    1981-4176 The [employer]. [address] [place] [address]. All rights reserved. This information is not intended as a substitute for professional medical care. Always follow your healthcare professional's instructions.      Please make an appointment to follow up with Your Primary Care Provider in 7-10 days for continued care.      Your next 10 appointments already scheduled     Jun 12, 2018 12:30 PM CDT   PHYSICAL with Aurora Youssef MD   [facility] ([facility])    [address] [place] [address] [phone]              24 Hour Appointment Hotline       To make an appointment at any [facility] call [phone] ([phone]). If you don't have a family doctor or clinic, we will help you find one. [facility] are conveniently located to serve the needs of you and your family.          ED Discharge Orders     AFO DOUB OCTAVIANO THOMPSON CALF                     Review of your medicines      START taking        Dose / Directions Last dose taken    naproxen 500 MG tablet   Commonly known as:  NAPROSYN   Dose:  500 mg   Quantity:  60 tablet        Take 1 tablet (500 mg) by mouth 2 times daily (with meals)   Refills:  0          Our records show that you are taking the medicines listed below. If these are incorrect, please call your family doctor or clinic.        Dose / Directions Last dose taken    * albuterol 108 (90 Base) MCG/ACT Inhaler   Commonly known as:  PROAIR HFA/PROVENTIL HFA/VENTOLIN HFA   Dose:  2 puff   Quantity:  1 Inhaler        Inhale 2 puffs into the lungs every 6 hours as needed for shortness of breath / dyspnea or wheezing   Refills:  1        * albuterol 108 (90 Base) MCG/ACT Inhaler   Commonly known as:  PROAIR HFA/PROVENTIL HFA/VENTOLIN HFA   Dose:  2 puff   Quantity:  1 Inhaler        Inhale 2 puffs into the lungs every 6 hours as needed for shortness of breath / dyspnea or wheezing   Refills:  0        EPINEPHrine 0.3 MG/0.3ML injection 2-pack   Commonly known as:  EPIPEN/ADRENACLICK/or ANY BX GENERIC EQUIV   Dose:  0.3 mg   Quantity:  0.6 mL        Inject 0.3 mLs (0.3 mg) into the muscle once as needed for anaphylaxis   Refills:  0        fexofenadine-pseudoePHEDrine 180-240 MG per 24 hr tablet   Commonly known as:  ALLEGRA-D 24   Dose:  1 tablet   Quantity:  14 tablet        Take 1 tablet by mouth daily   Refills:  0        fluticasone 50 MCG/ACT spray   Commonly known as:  FLONASE   Dose:  1-2 spray   Quantity:  1 Bottle        Spray 1-2 sprays into both nostrils daily   Refills:  0        medroxyPROGESTERone 150 MG/ML injection   Commonly known as:  DEPO-PROVERA   Dose:  150 mg   Quantity:  1 mL        Inject 1 mL (150 mg) into the muscle every 3 months   Refills:  1        * Notice:  This list has 2 medication(s) that are the same as other medications prescribed for you. Read the directions carefully, and ask your doctor or other  care provider to review them with you.            Prescriptions were sent or printed at these locations (1 Prescription)                   Other Prescriptions                Printed at Department/Unit printer (1 of 1)         naproxen (NAPROSYN) 500 MG tablet                Procedures and tests performed during your visit     Ankle XR, G/E 3 views, right      Orders Needing Specimen Collection     None      Pending Results     No orders found from 6/10/2018 to 6/13/2018.            Pending Culture Results     No orders found from 6/10/2018 to 6/13/2018.            Pending Results Instructions     If you had any lab results that were not finalized at the time of your Discharge, you can call the ED Lab Result RN at [phone]. You will be contacted by this team for any positive Lab results or changes in treatment. The nurses are available 7 days a week from 10A to 6:30P.  You can leave a message 24 hours per day and they will return your call.        Thank you for choosing [facility]       Thank you for choosing [facility] for your care. Our goal is always to provide you with excellent care. Hearing back from our patients is one way we can continue to improve our services. Please take a few minutes to complete the written survey that you may receive in the mail after you visit with us. Thank you!        [facility] Information     [employer] gives you secure access to your electronic health record. If you see a primary care provider, you can also send messages to your care team and make appointments. If you have questions, please call your primary care clinic.  If you do not have a primary care provider, please call [phone] and they will assist you.        Care EveryWhere ID     This is your Care EveryWhere ID. This could be used by other organizations to access your [facility] medical records  [id number]        Equal Access to Services     RENE JAMES : randy Norton qaybta kaalmada  gladis kenney ah. So [facility] [phone].    ATENCIÓN: Si habla español, tiene a balderas disposición servicios gratuitos de asistencia lingüística. Llame al [phone].    We comply with applicable federal civil rights laws and [place] laws. We do not discriminate on the basis of race, color, national origin, age, disability, sex, sexual orientation, or gender identity.            After Visit Summary       This is your record. Keep this with you and show to your community pharmacist(s) and doctor(s) at your next visit.

## 2018-06-12 NOTE — PROGRESS NOTES
"  SUBJECTIVE:   Michelle Joshua is a 27 year old female who presents to clinic today for the following health issues:      Musculoskeletal problem/pain      Duration: ***    Description  Location: ***    Intensity:  {mild,moderate,severe:715450}    Accompanying signs and symptoms: {OTHER MS SYMPTOMS:702526::\"none\"}    History  Previous similar problem: { :462601}  Previous evaluation:  {PREVIOUS ms evaluation:104916::\"none\"}    Precipitating or alleviating factors:  Trauma or overuse: { :912247}  Aggravating factors include: {AGGRAVATING MS FACTORS CHRONIC PROB:340197::\"none\"}    Therapies tried and outcome: {MS RELIEF ITEMS:754898::\"nothing\"}      {additional problems for provider to add:096994}    Problem list and histories reviewed & adjusted, as indicated.  Additional history: {NONE - AS DOCUMENTED:816554::\"as documented\"}    {HIST REVIEW/ LINKS 2:277406}    Reviewed and updated as needed this visit by clinical staff       Reviewed and updated as needed this visit by Provider         {PROVIDER CHARTING PREFERENCE:614804}  "

## 2018-06-12 NOTE — MR AVS SNAPSHOT
After Visit Summary   6/12/2018    Michelle Joshua    MRN: [id number]           Patient Information     Date Of Birth          1990        Visit Information        Provider Department      6/12/2018 12:30 PM Aurora Youssef MD [facility]        Today's Diagnoses     Sprain of right ankle, unspecified ligament, subsequent encounter    -  1       Follow-ups after your visit        Additional Services     ORTHOPEDICS ADULT REFERRAL       Your provider has referred you to: [facility] [place] [phone]   [email or web address]    Please be aware that coverage of these services is subject to the terms and limitations of your health insurance plan.  Call member services at your health plan with any benefit or coverage questions.      Please bring the following to your appointment:    >>   Any x-rays, CTs or MRIs which have been performed.  Contact the facility where they were done to arrange for  prior to your scheduled appointment.    >>   List of current medications   >>   This referral request   >>   Any documents/labs given to you for this referral                  Who to contact     If you have questions or need follow up information about today's clinic visit or your schedule please contact [facility] directly at [phone].  Normal or non-critical lab and imaging results will be communicated to you by MyChart, letter or phone within 4 business days after the clinic has received the results. If you do not hear from us within 7 days, please contact the clinic through MyChart or phone. If you have a critical or abnormal lab result, we will notify you by phone as soon as possible.  Submit refill requests through [employer] or call your pharmacy and they will forward the refill request to us. Please allow 3 business days for your refill to be completed.          Additional Information About Your Visit        MyChart Information      "[email or web address] gives you secure access to your electronic health record. If you see a primary care provider, you can also send messages to your care team and make appointments. If you have questions, please call your primary care clinic.  If you do not have a primary care provider, please call [phone] and they will assist you.        Care EveryWhere ID     This is your Care EveryWhere ID. This could be used by other organizations to access your [facility] medical records  [id number]        Your Vitals Were     Pulse Temperature Height Pulse Oximetry BMI (Body Mass Index)       90 98.8  F (37.1  C) (Oral) 5' 2\" (1.575 m) 98% 34.93 kg/m2        Blood Pressure from Last 3 Encounters:   06/12/18 138/89   06/12/18 138/76   08/28/17 127/77    Weight from Last 3 Encounters:   06/12/18 191 lb (86.6 kg)   08/28/17 204 lb (92.5 kg)   08/14/17 202 lb (91.6 kg)              We Performed the Following     ORTHOPEDICS ADULT REFERRAL        Primary Care Provider Office Phone # Fax #    Aurora Youssef [phone]       [address] [address]        Equal Access to Services     RENE JAMES : Hadii aad ku hadasho Soomaali, waaxda luqadaha, qaybta kaalmada adeegyada, waxay idiin hayurbanon bro gao . So [facility] [phone].    ATENCIÓN: Si habla español, tiene a balderas disposición servicios gratuitos de asistencia lingüística. Llame al [phone].    We comply with applicable federal civil rights laws and [place] laws. We do not discriminate on the basis of race, color, national origin, age, disability, sex, sexual orientation, or gender identity.            Thank you!     Thank you for choosing [facility]  for your care. Our goal is always to provide you with excellent care. Hearing back from our patients is one way we can continue to improve our services. Please take a few minutes to complete the written survey that you may receive in the mail after your visit with us. Thank " you!             Your Updated Medication List - Protect others around you: Learn how to safely use, store and throw away your medicines at www.disposemymeds.org.          This list is accurate as of 6/12/18  1:06 PM.  Always use your most recent med list.                   Brand Name Dispense Instructions for use Diagnosis    * albuterol 108 (90 Base) MCG/ACT Inhaler    PROAIR HFA/PROVENTIL HFA/VENTOLIN HFA    1 Inhaler    Inhale 2 puffs into the lungs every 6 hours as needed for shortness of breath / dyspnea or wheezing    Viral URI with cough       * albuterol 108 (90 Base) MCG/ACT Inhaler    PROAIR HFA/PROVENTIL HFA/VENTOLIN HFA    1 Inhaler    Inhale 2 puffs into the lungs every 6 hours as needed for shortness of breath / dyspnea or wheezing    Mild intermittent asthma without complication       EPINEPHrine 0.3 MG/0.3ML injection 2-pack    EPIPEN/ADRENACLICK/or ANY BX GENERIC EQUIV    0.6 mL    Inject 0.3 mLs (0.3 mg) into the muscle once as needed for anaphylaxis    Bee sting-induced anaphylaxis, accidental or unintentional, sequela       fexofenadine-pseudoePHEDrine 180-240 MG per 24 hr tablet    ALLEGRA-D 24    14 tablet    Take 1 tablet by mouth daily    Viral URI with cough       fluticasone 50 MCG/ACT spray    FLONASE    1 Bottle    Spray 1-2 sprays into both nostrils daily    Environmental allergies       medroxyPROGESTERone 150 MG/ML injection    DEPO-PROVERA    1 mL    Inject 1 mL (150 mg) into the muscle every 3 months    Depo-Provera contraceptive status, Encounter for initial prescription of injectable contraceptive       naproxen 500 MG tablet    NAPROSYN    60 tablet    Take 1 tablet (500 mg) by mouth 2 times daily (with meals)        * Notice:  This list has 2 medication(s) that are the same as other medications prescribed for you. Read the directions carefully, and ask your doctor or other care provider to review them with you.

## 2018-06-12 NOTE — NURSING NOTE
"Chief Complaint   Patient presents with     ER F/U     /89  Pulse 90  Temp 98.8  F (37.1  C) (Oral)  Ht 5' 2\" (1.575 m)  Wt 191 lb (86.6 kg)  SpO2 98%  BMI 34.93 kg/m2 Estimated body mass index is 34.93 kg/(m^2) as calculated from the following:    Height as of this encounter: 5' 2\" (1.575 m).    Weight as of this encounter: 191 lb (86.6 kg).  Medication Reconciliation: complete      Health Maintenance that is potentially due pending provider review:  Pap Smear, GAD7 and ACT    Pt will schedule physical appt in September and have pap smear done at that time.  Completing forms today.     HOLLY Lin  "

## 2018-06-12 NOTE — DISCHARGE INSTRUCTIONS
Treating Ankle Sprains  Treatment will depend on how bad your sprain is. For a severe sprain, healing may take 3 months or more.  Right after your injury: Use R.I.C.E.    Rest: At first, keep weight off the ankle as much as you can. You may be given crutches to help you walk without putting weight on the ankle.    Ice: Put an ice pack on the ankle for 20 minutes. Remove the pack and wait at least 30 minutes. Repeat for up to 3 days. This helps reduce swelling.    Compression: To reduce swelling and keep the joint stable, you may need to wrap the ankle with an elastic bandage. For more severe sprains, you may need an ankle brace, a boot, or a cast.    Elevation: To reduce swelling, keep your ankle raised above your heart when you sit or lie down.  Medicine  Your healthcare provider may suggest oral nonsteroidal anti-inflammatory medicine (NSAIDs), such as ibuprofen. This relieves the pain and helps reduce swelling. Be sure to take your medicine as directed.  Exercises    After about 2 to 3 weeks, you may be given exercises to strengthen the ligaments and muscles in the ankle. Doing these exercises will help prevent another ankle sprain. Exercises may include standing on your toes and then on your heels and doing ankle curls.  Ankle curls    Sit on the edge of a sturdy table or lie on your back.    Pull your toes toward you. Then point them away from you. Repeat for 2 to 3 minutes.   Date Last Reviewed: 1/1/2018 2000-2017 [employer]. [address] [place] [address]. All rights reserved. This information is not intended as a substitute for professional medical care. Always follow your healthcare professional's instructions.          Understanding Ankle Sprain    The ankle is the joint where the leg and foot meet. Bones are held in place by connective tissue called ligaments. When ankle ligaments are stretched to the point of pain and injury, it is called an ankle sprain. A sprain can tear  the ligaments. These tears can be very small but still cause pain. Ankle sprains can be mild or severe.  What causes an ankle sprain?  A sprain may occur when you twist your ankle or bend it too far. This can happen when you stumble or fall. Things that can make an ankle sprain more likely include:    Having had an ankle sprain before    Playing sports that involve running and jumping. Or playing contact sports such as football or hockey.    Wearing shoes that don t support your feet and ankles well    Having ankles with poor strength and flexibility  Symptoms of an ankle sprain  Symptoms may include:    Pain or soreness in the ankle    Swelling    Redness or bruising    Not being able to walk or put weight on the affected foot    Reduced range of motion in the ankle    A popping or tearing feeling at the time the sprain occurs    An abnormal or dislocated look to the ankle    Instability or too much range of motion in the ankle  Treatment for an ankle sprain  Treatment focuses on reducing pain and swelling, and avoiding further injury. Treatments may include:    Resting the ankle. Avoid putting weight on it. This may mean using crutches until the sprain heals.    Prescription or over-the-counter pain medicines. These help reduce swelling and pain.    Cold packs. These help reduce pain and swelling.    Raising your ankle above your heart. This helps reduce swelling.    Wrapping the ankle with an elastic bandage or ankle brace. This helps reduce swelling and gives some support to the ankle. In rare cases, you may need a cast or boot.    Stretching and other exercises. These improve flexibility and strength.    Heat packs. These may be recommended before doing ankle exercises.  Possible complications of an ankle sprain  An ankle that has been weakened by a sprain can be more likely to have repeated sprains afterward. Doing exercises to strengthen your ankle and improve balance can reduce your risk for repeated  sprains. Other possible complications are long-term (chronic) pain or an ankle that remains unstable.  When to call your healthcare provider  Call your healthcare provider right away if you have any of these:    Fever of 100.4 F (38 C) or higher, or as directed    Pain, numbness, discoloration, or coldness in the foot or toes    Pain that gets worse    Symptoms that don t get better, or get worse    New symptoms   Date Last Reviewed: 3/10/2016    4656-1624 The [employer]. [address] [place] [address]. All rights reserved. This information is not intended as a substitute for professional medical care. Always follow your healthcare professional's instructions.      Please make an appointment to follow up with Your Primary Care Provider in 7-10 days for continued care.

## 2018-06-13 ASSESSMENT — ASTHMA QUESTIONNAIRES: ACT_TOTALSCORE: 22

## 2018-06-13 ASSESSMENT — PATIENT HEALTH QUESTIONNAIRE - PHQ9: SUM OF ALL RESPONSES TO PHQ QUESTIONS 1-9: 0

## 2018-06-13 ASSESSMENT — ANXIETY QUESTIONNAIRES: GAD7 TOTAL SCORE: 1

## 2018-06-15 ENCOUNTER — TELEPHONE (OUTPATIENT)
Dept: FAMILY MEDICINE | Facility: CLINIC | Age: 28
End: 2018-06-15

## 2018-06-15 NOTE — TELEPHONE ENCOUNTER
Received form(s) from [employer].  Placed form(s) in/on LS's desk.  Forms need to be filled out and signed and faxed to number listed on form..    Call pt to verify form was sent: No  Copy needs to be sent for scanning after completion: Yes    HOLLY Lin

## 2018-06-19 ENCOUNTER — OFFICE VISIT (OUTPATIENT)
Dept: FAMILY MEDICINE | Facility: CLINIC | Age: 28
End: 2018-06-19
Payer: COMMERCIAL

## 2018-06-19 VITALS
DIASTOLIC BLOOD PRESSURE: 88 MMHG | HEIGHT: 62 IN | BODY MASS INDEX: 33.68 KG/M2 | HEART RATE: 111 BPM | RESPIRATION RATE: 12 BRPM | SYSTOLIC BLOOD PRESSURE: 149 MMHG | WEIGHT: 183 LBS | TEMPERATURE: 98.7 F | OXYGEN SATURATION: 97 %

## 2018-06-19 DIAGNOSIS — S93.491A SPRAIN OF ANTERIOR TALOFIBULAR LIGAMENT OF RIGHT ANKLE, INITIAL ENCOUNTER: Primary | ICD-10-CM

## 2018-06-19 PROCEDURE — 99214 OFFICE O/P EST MOD 30 MIN: CPT | Performed by: FAMILY MEDICINE

## 2018-06-19 NOTE — TELEPHONE ENCOUNTER
Reason for Call:  Other call back    Detailed comments: pt calling to determine if she needs to come in for her appt which is at 2p today. States she shouldnt have to come in if forms were faxed already. Wanted to speak with nurse in regards to this    Phone Number Patient can be reached at: Home number on file [phone] (home)    Best Time: anytime    Can we leave a detailed message on this number? YES    Call taken on 6/19/2018 at 12:37 PM by Savita Bonilla

## 2018-06-19 NOTE — MR AVS SNAPSHOT
After Visit Summary   6/19/2018    Michelle Joshua    MRN: [id number]           Patient Information     Date Of Birth          1990        Visit Information        Provider Department      6/19/2018 2:00 PM Aurora Youssef MD [facility]        Today's Diagnoses     Sprain of anterior talofibular ligament of right ankle, initial encounter    -  1       Follow-ups after your visit        Your next 10 appointments already scheduled     Jul 03, 2018  2:10 PM CDT   GOPAL Extremity with Pippa Alvarez PT   [facility]    [address] [place] [id number]   [phone]            Jul 10, 2018  1:10 PM CDT   GOPAL Extremity with Bigg Scott PT   [facility]    [address] [place] [id number] [phone]            Jul 17, 2018  2:30 PM CDT   GOPAL Extremity with Bigg Scott PT   [facility]    [address] [place] [id number] [phone]              Who to contact     If you have questions or need follow up information about today's clinic visit or your schedule please contact [facility] directly at [phone].  Normal or non-critical lab and imaging results will be communicated to you by MyChart, letter or phone within 4 business days after the clinic has received the results. If you do not hear from us within 7 days, please contact the clinic through [employer] or phone. If you have a critical or abnormal lab result, we will notify you by phone as soon as possible.  Submit refill requests through [employer] or call your pharmacy and they will forward the refill request to us. Please allow 3 business days for your refill to be completed.          Additional Information About Your Visit        MyChart Information     [employer] gives you  "secure access to your electronic health record. If you see a primary care provider, you can also send messages to your care team and make appointments. If you have questions, please call your primary care clinic.  If you do not have a primary care provider, please call [phone] and they will assist you.        Care EveryWhere ID     This is your Care EveryWhere ID. This could be used by other organizations to access your [place] medical records  [id number]        Your Vitals Were     Pulse Temperature Respirations Height Pulse Oximetry Breastfeeding?    111 98.7  F (37.1  C) (Oral) 12 5' 2\" (1.575 m) 97% No    BMI (Body Mass Index)                   33.47 kg/m2            Blood Pressure from Last 3 Encounters:   06/21/18 140/88   06/19/18 149/88   06/12/18 138/89    Weight from Last 3 Encounters:   06/21/18 183 lb (83 kg)   06/19/18 183 lb (83 kg)   06/12/18 191 lb (86.6 kg)              Today, you had the following     No orders found for display         Today's Medication Changes          These changes are accurate as of 6/19/18 11:59 PM.  If you have any questions, ask your nurse or doctor.               Start taking these medicines.        Dose/Directions    order for DME   Used for:  Sprain of anterior talofibular ligament of right ankle, initial encounter   Started by:  Aurora Youssef MD        Equipment being ordered:   Quantity:  1 Device   Refills:  0            Where to get your medicines      Some of these will need a paper prescription and others can be bought over the counter.  Ask your nurse if you have questions.     Bring a paper prescription for each of these medications     order for DME                Primary Care Provider Office Phone # Fax #    Aurora Youssef [phone] [address] [address]        Equal Access to Services     RENE JAMES AH: randy Norton, gladis kaufman " aramistoñolee gill'aan ah. So [facility] [phone].    ATENCIÓN: Si yoly katz, tiene a balderas disposición servicios gratuitos de asistencia lingüística. Naveen al [phone].    We comply with applicable federal civil rights laws and [place] laws. We do not discriminate on the basis of race, color, national origin, age, disability, sex, sexual orientation, or gender identity.            Thank you!     Thank you for choosing [facility]  for your care. Our goal is always to provide you with excellent care. Hearing back from our patients is one way we can continue to improve our services. Please take a few minutes to complete the written survey that you may receive in the mail after your visit with us. Thank you!             Your Updated Medication List - Protect others around you: Learn how to safely use, store and throw away your medicines at www.disposemymeds.org.          This list is accurate as of 6/19/18 11:59 PM.  Always use your most recent med list.                   Brand Name Dispense Instructions for use Diagnosis    * albuterol 108 (90 Base) MCG/ACT Inhaler    PROAIR HFA/PROVENTIL HFA/VENTOLIN HFA    1 Inhaler    Inhale 2 puffs into the lungs every 6 hours as needed for shortness of breath / dyspnea or wheezing    Viral URI with cough       * albuterol 108 (90 Base) MCG/ACT Inhaler    PROAIR HFA/PROVENTIL HFA/VENTOLIN HFA    1 Inhaler    Inhale 2 puffs into the lungs every 6 hours as needed for shortness of breath / dyspnea or wheezing    Mild intermittent asthma without complication       EPINEPHrine 0.3 MG/0.3ML injection 2-pack    EPIPEN/ADRENACLICK/or ANY BX GENERIC EQUIV    0.6 mL    Inject 0.3 mLs (0.3 mg) into the muscle once as needed for anaphylaxis    Bee sting-induced anaphylaxis, accidental or unintentional, sequela       fexofenadine-pseudoePHEDrine 180-240 MG per 24 hr tablet    ALLEGRA-D 24    14 tablet    Take 1 tablet by mouth daily    Viral URI with cough       fluticasone 50 MCG/ACT  spray    FLONASE    1 Bottle    Spray 1-2 sprays into both nostrils daily    Environmental allergies       medroxyPROGESTERone 150 MG/ML injection    DEPO-PROVERA    1 mL    Inject 1 mL (150 mg) into the muscle every 3 months    Depo-Provera contraceptive status, Encounter for initial prescription of injectable contraceptive       naproxen 500 MG tablet    NAPROSYN    60 tablet    Take 1 tablet (500 mg) by mouth 2 times daily (with meals)        order for DME     1 Device    Equipment being ordered:    Sprain of anterior talofibular ligament of right ankle, initial encounter       * Notice:  This list has 2 medication(s) that are the same as other medications prescribed for you. Read the directions carefully, and ask your doctor or other care provider to review them with you.

## 2018-06-19 NOTE — PROGRESS NOTES
SUBJECTIVE:   Michelle Joshua is a 27 year old female who presents to clinic today for the following health issues:      Patient is in today to complete her ANSHU and follow up on right foot.      Seems better now, she is off the crutches and she is in a walking boot but still gets swollen at the end of the day and hurts when she walks or stands for a prolong period of time , she works at [employer] and is on her feet all day long  So she has been off work and needs me to fill out her FMLA forms for work.    Problem list and histories reviewed & adjusted, as indicated.  Additional history: as documented    Patient Active Problem List   Diagnosis     Allergic rhinitis     Other acne     CARDIOVASCULAR SCREENING; LDL GOAL LESS THAN 160     Nicotine addiction     Bee sting-induced anaphylaxis     Bunion of left foot     Mild intermittent asthma     Obesity     Hip pain, chronic     Myalgia and myositis     Supervision of other normal pregnancy, antepartum     Need for Tdap vaccination     Encounter for triage in pregnant patient     Group B Streptococcus carrier, +RV culture, currently pregnant     Pregnancy      (spontaneous vaginal delivery)     Contraception     Shoulder pain     Pain in joint involving ankle and foot, right     Past Surgical History:   Procedure Laterality Date     HC CREATE EARDRUM OPENING,GEN ANESTH      P.E. Tubes     HC REMOVE TONSILS/ADENOIDS,<12 Y/O      T & A <11y.o.       Social History   Substance Use Topics     Smoking status: Current Every Day Smoker     Packs/day: 0.50     Types: Cigarettes     Smokeless tobacco: Never Used      Comment: sometimes     Alcohol use No     Family History   Problem Relation Age of Onset     Hypertension Mother          Current Outpatient Prescriptions   Medication Sig Dispense Refill     albuterol (PROAIR HFA/PROVENTIL HFA/VENTOLIN HFA) 108 (90 Base) MCG/ACT Inhaler Inhale 2 puffs into the lungs every 6 hours as needed for shortness of breath / dyspnea or  wheezing 1 Inhaler 0     albuterol (PROAIR HFA/PROVENTIL HFA/VENTOLIN HFA) 108 (90 BASE) MCG/ACT Inhaler Inhale 2 puffs into the lungs every 6 hours as needed for shortness of breath / dyspnea or wheezing 1 Inhaler 1     EPINEPHrine (EPIPEN/ADRENACLICK/OR ANY BX GENERIC EQUIV) 0.3 MG/0.3ML injection 2-pack Inject 0.3 mLs (0.3 mg) into the muscle once as needed for anaphylaxis 0.6 mL 0     fexofenadine-pseudoePHEDrine (ALLEGRA-D 24) 180-240 MG per 24 hr tablet Take 1 tablet by mouth daily 14 tablet 0     fluticasone (FLONASE) 50 MCG/ACT spray Spray 1-2 sprays into both nostrils daily 1 Bottle 0     medroxyPROGESTERone (DEPO-PROVERA) 150 MG/ML injection Inject 1 mL (150 mg) into the muscle every 3 months 1 mL 1     naproxen (NAPROSYN) 500 MG tablet Take 1 tablet (500 mg) by mouth 2 times daily (with meals) 60 tablet 0     dexamethasone (DECADRON) 4 MG/ML injection To be used topically by therapist during PT sessions. 30 mL 0     order for DME Equipment being ordered: size 7 trilok brace 1 Device 0     [DISCONTINUED] fluticasone (FLOVENT DISKUS) 100 MCG/BLIST AEPB Inhale 1 puff (100 mcg) into the lungs 2 times daily 1 each 3     Allergies   Allergen Reactions     Amoxicillin Hives     Bees      Penicillins      Sulfa Drugs      Recent Labs   Lab Test  01/31/17 2042  01/31/17 2031  11/15/16   1123  11/10/14   1045   05/18/10   1250   ALT   --   58*  20   --    --   <6   CR   --   0.66  0.72   --    < >  0.50*   GFRESTIMATED  >90  >90  Non African American GFR Calc    >90  Non  GFR Calc     --    < >  >90   GFRESTBLACK  >90  >90  African American GFR Calc    >90   GFR Calc     --    < >  >90   POTASSIUM   --   3.6  4.1   --    < >  3.5   TSH   --    --    --   0.76   --    --     < > = values in this interval not displayed.      BP Readings from Last 3 Encounters:   06/21/18 140/88   06/19/18 149/88   06/12/18 138/89    Wt Readings from Last 3 Encounters:   06/21/18 183 lb (83 kg)  "  06/19/18 183 lb (83 kg)   06/12/18 191 lb (86.6 kg)                  Labs reviewed in EPIC    Reviewed and updated as needed this visit by clinical staff  Tobacco  Allergies  Meds       Reviewed and updated as needed this visit by Provider         ROS:  Constitutional, HEENT, cardiovascular, pulmonary, GI, , musculoskeletal, neuro, skin, endocrine and psych systems are negative, except as otherwise noted.    OBJECTIVE:     /88 (BP Location: Left arm, Patient Position: Sitting, Cuff Size: Adult Regular)  Pulse 111  Temp 98.7  F (37.1  C) (Oral)  Resp 12  Ht 5' 2\" (1.575 m)  Wt 183 lb (83 kg)  SpO2 97%  Breastfeeding? No  BMI 33.47 kg/m2  Body mass index is 33.47 kg/(m^2).  GENERAL: healthy, alert and no distress  EYES: Eyes grossly normal to inspection, PERRL and conjunctivae and sclerae normal  NECK: no adenopathy, no asymmetry, masses, or scars and thyroid normal to palpation  MS: no gross musculoskeletal defects noted, no edema    Diagnostic Test Results:  none     ASSESSMENT/PLAN:             1. Sprain of anterior talofibular ligament of right ankle, initial encounter  Still in quite a bit of pain with walking and I have filled out her forms for FMLA but want her to see a specialist , she did not make an appointment with ortho as I advised , now I have referred her to podiatry and she will be seeing Dr Kate in a couple of days . Meanwhile discussed elevated leg, rest, NSAIDS< ice etc .      RTC if no improving or worsening.  Pt is aware  and comfortable with the current plan.      Aurora Youssef MD  [facility]    "

## 2018-06-20 NOTE — TELEPHONE ENCOUNTER
Left message for patient to let her know that her 4:00pm  appt for tomorrow is not with Dr. Youssef, it is with Podiatry here today at [place] at 2:00pm.      FAHAD Sebastianjeromy VÁSQUEZ Lead  [facility]

## 2018-06-21 ENCOUNTER — TELEPHONE (OUTPATIENT)
Dept: PODIATRY | Facility: CLINIC | Age: 28
End: 2018-06-21

## 2018-06-21 ENCOUNTER — OFFICE VISIT (OUTPATIENT)
Dept: PODIATRY | Facility: CLINIC | Age: 28
End: 2018-06-21
Attending: FAMILY MEDICINE
Payer: COMMERCIAL

## 2018-06-21 VITALS
WEIGHT: 183 LBS | BODY MASS INDEX: 33.68 KG/M2 | DIASTOLIC BLOOD PRESSURE: 88 MMHG | SYSTOLIC BLOOD PRESSURE: 140 MMHG | HEIGHT: 62 IN

## 2018-06-21 DIAGNOSIS — M79.661 RIGHT CALF PAIN: Primary | ICD-10-CM

## 2018-06-21 DIAGNOSIS — S93.491A SPRAIN OF ANTERIOR TALOFIBULAR LIGAMENT OF RIGHT ANKLE, INITIAL ENCOUNTER: Primary | ICD-10-CM

## 2018-06-21 PROCEDURE — 99243 OFF/OP CNSLTJ NEW/EST LOW 30: CPT | Performed by: PODIATRIST

## 2018-06-21 RX ORDER — DEXAMETHASONE SODIUM PHOSPHATE 4 MG/ML
INJECTION, SOLUTION INTRA-ARTICULAR; INTRALESIONAL; INTRAMUSCULAR; INTRAVENOUS; SOFT TISSUE
Qty: 30 ML | Refills: 0 | OUTPATIENT
Start: 2018-06-21 | End: 2020-10-15

## 2018-06-21 NOTE — PATIENT INSTRUCTIONS
Thank you for choosing [facility]!    DR. ALTMAN'S CLINIC LOCATIONS:   MONDAY - [place] TUESDAY - [place]   [address] [facility]  [address]   [place] [address] [place] [address]   [phone]       THURSDAY AM - [place] THURSDAY PM - [place]   [address]   [place] [address] [place] [address]   [phone]       FRIDAY AM - [place] SET UP SURGERY: [phone] [address] APPOINTMENTS: [phone]   [place] [address] BILLING QUESTIONS: [phone] FAX NUMBER: [phone]     Follow Up:  After PT    ANKLE SPRAIN  An ankle sprain is an injury to one or more ligaments in the ankle, usually on the outside of the ankle. Ligaments are bands of tissue   like rubber bands   that connect one bone to another and bind the joints together. In the ankle joint, ligaments provide stability by limiting side-to-side movement.  Some ankle sprains are much worse than others. The severity of an ankle sprain depends on whether the ligament is stretched, partially torn, or completely torn, as well as on the number of ligaments involved. Ankle sprains are not the same as strains, which affect muscles rather than ligaments.  CAUSES  Sprained ankles often result from a fall, a sudden twist, or a blow that forces the ankle joint out of its normal position. Ankle sprains commonly occur while participating in sports, wearing inappropriate shoes, or walking or running on an uneven surface.  Sometimes ankle sprains occur because of a person is born with weak ankles. Previous ankle or foot injuries can also weaken the ankle and lead to sprains.  SYMPTOMS  The symptoms of ankle sprains may include: pain or soreness, swelling, bruising, difficulty walking, and stiffness in the joint.  These symptoms may vary in intensity, depending on the severity of the sprain. Sometimes pain and swelling are  absent in people with previous ankle sprains. Instead, they may simply feel the ankle is wobbly and unsteady when they walk. Even if there is no pain or swelling with a sprained ankle, treatment is crucial. Any ankle sprain   whether it s your first or your fifth   requires prompt medical attention.  DIAGNOSIS  In evaluating your injury, the foot and ankle surgeon will obtain a thorough history of your symptoms and examine your foot. X-rays or other advanced imaging studies may be ordered to help determine the severity of the injury.  MEDICAL TREATMENT  There are four key reasons why an ankle sprain should be promptly evaluated and treated by a foot and ankle surgeon:  An untreated ankle sprain may lead to chronic ankle instability, a condition marked by persistent discomfort and a  giving way  of the ankle. Weakness in the leg may also develop.   A more severe ankle injury may have occurred along with the sprain. This might include a serious bone fracture that, if left untreated, could lead to troubling complications.   An ankle sprain may be accompanied by a foot injury that causes discomfort but has gone unnoticed thus far.   Rehabilitation of a sprained ankle needs to begin right away. If rehabilitation is delayed, the injury may be less likely to heal properly.   NON-SURGICAL TREATMENT  When you have an ankle sprain, rehabilitation is crucial and it starts the moment your treatment begins. Your foot and ankle surgeon may recommend one or more of the following treatment options:  Rest. Stay off the injured ankle. Walking may cause further injury.   Ice. Apply an ice pack to the injured area, placing a thin towel between the ice and the skin. Use ice for 20 minutes and then wait at least 40 minutes before icing again.   Compression. An elastic wrap may be recommended to control swelling.   Elevation. The ankle should be raised slightly above the level of your heart to reduce swelling.   Early physical therapy.  Your doctor will start you on a rehabilitation program as soon as possible to promote healing and increase your range of motion. This includes doing prescribed exercises.   Medications. Nonsteroidal anti-inflammatory drugs (NSAIDs), such as ibuprofen, may be recommended to reduce pain and inflammation. In some cases, prescription pain medications are needed to provide adequate relief.   SURGICAL TREATMENT  In more severe cases, surgery may be required to adequately treat an ankle sprain. Surgery often involves repairing the damaged ligament or ligaments. The foot and ankle surgeon will select the surgical procedure best suited for your case based on the type and severity of your injury as well as your activity level.  After surgery, rehabilitation is extremely important. Completing your rehabilitation program is crucial to a successful outcome. Be sure to continue to see your foot and ankle surgeon during this period to ensure that your ankle heals properly and function is restored.    PRICE THERAPY  Many aches and pains throughout the foot and ankle can be helped with many simple treatments. This is usually described as PRICE Therapy.      P - Protection - often times, inflammation/pain in the lower extremity is not able to improve simply because the areas involved are never allowed to rest. Every step we take can bother the problematic area. Protecting those areas is an important step in the healing process. This may involve a walking cast boot, a special insert/orthotic device, an ankle brace, or simply avoiding barefoot walking.    R - Rest - in addition to protecting the foot/ankle, resting is an important, but often times difficult, treatment option. Getting off your feet when they bother you, and specifically avoiding activities that cause pain/discomfort, are very beneficial to prevent, and treat, foot/ankle pain.      I - Ice - icing regularly can help to decrease inflammation and swelling in the foot, thus  decreasing pain. Using an ice pack or a bag of frozen veggies works very well. Ice for 20 minutes multiple times per day as needed.  Do not place the ice directly on the skin as this can cause tissue damage.    C - Compression - using a compression wrap or an ACE wrap can help to decrease swelling, which can help to decrease pain. Wearing the wraps is generally not needed at night, but they should be worn on a regular basis when you are going to be on your feet for prolonged periods as gravity tends to pull fluids down to your feet/ankles.    E - Elevation - elevating your lower extremities multiple times daily for 15-20 minutes can help to decrease swelling, which works well in decreasing pain levels.    NSAID/Tylenol - Anti-inflammatories like Aleve or ibuprofen, and/or a pain medication, such as Tylenol, can help to improve pain levels and get the issue resolved sooner rather than later. Anyone with liver issues should be careful with Tylenol, and anyone with high blood pressure or heart, stomach or kidney issues should be careful with anti-inflammatories. Please ask if you have questions about these medications, including dosage.      Body Mass Index (BMI)  Many things can cause foot and ankle problems. Foot structure, activity level, foot mechanics and injuries are common causes of pain. One very important issue that often goes unmentioned, is body weight. Extra weight can cause increased stress on muscles, ligaments, bones and tendons. Sometimes just a few extra pounds is all it takes to put one over her/his threshold. Without reducing that stress, it can be difficult to alleviate pain. Some people are uncomfortable addressing this issue, but we feel it is important for you to think about it. As Foot &  Ankle specialists, our job is addressing the lower extremity problem and possible causes. Regarding extra body weight, we encourage patients to discuss diet and weight management plans with their primary care  doctors. It is this team approach that gives you the best opportunity for pain relief and getting you back on your feet.

## 2018-06-21 NOTE — PROGRESS NOTES
"Foot & Ankle Surgery  June 21, 2018    CC: \"ankle injury\"    I was asked to see Michelle Joshua regarding the chief complaint by:  Dr. SHEEBA Gutierrez    HPI:  Pt is a 27 year old female who presents with above complaint.  Slipped in her kitchen 10 days ago.  She was seen in the ER 9 days ago.  xrays were neg for fracture.  She's been WBAT in the CAM.  She feels some tightness in her calf.  Describes a deep ache, throbbing, tingling pain.  She was diagnosed with an ankle sprain and made WBAT in the boot in the ER.  Dr Youssef had concerns that it may be more than a sprain and advised follow up.  Patient notes bruising to the anterior ankle.  Pain increases with increased time on her feet.    ROS:   Pos for CC.  The patient denies current nausea, vomiting, chills, fevers, belly pain, calf pain, chest pain or SOB.  Complete remainder of ROS is otherwise neg.    VITALS:    Vitals:    06/21/18 1558   BP: 140/88   Weight: 183 lb (83 kg)   Height: 5' 2\" (1.575 m)       PMH:    Past Medical History:   Diagnosis Date     Uncomplicated asthma      Unspecified otitis media     Otitis Media as a child       SXHX:    Past Surgical History:   Procedure Laterality Date     HC CREATE EARDRUM OPENING,GEN ANESTH      P.E. Tubes     HC REMOVE TONSILS/ADENOIDS,<10 Y/O      T & A <11y.o.        MEDS:    Current Outpatient Prescriptions   Medication     albuterol (PROAIR HFA/PROVENTIL HFA/VENTOLIN HFA) 108 (90 Base) MCG/ACT Inhaler     albuterol (PROAIR HFA/PROVENTIL HFA/VENTOLIN HFA) 108 (90 BASE) MCG/ACT Inhaler     EPINEPHrine (EPIPEN/ADRENACLICK/OR ANY BX GENERIC EQUIV) 0.3 MG/0.3ML injection 2-pack     fexofenadine-pseudoePHEDrine (ALLEGRA-D 24) 180-240 MG per 24 hr tablet     fluticasone (FLONASE) 50 MCG/ACT spray     medroxyPROGESTERone (DEPO-PROVERA) 150 MG/ML injection     naproxen (NAPROSYN) 500 MG tablet     [DISCONTINUED] fluticasone (FLOVENT DISKUS) 100 MCG/BLIST AEPB     No current facility-administered medications for this " visit.        ALL:     Allergies   Allergen Reactions     Amoxicillin Hives     Bees      Penicillins      Sulfa Drugs        FMH:    Family History   Problem Relation Age of Onset     Hypertension Mother        SocHx:    Social History     Social History     Marital status: Single     Spouse name: N/A     Number of children: 0     Years of education: 10     Occupational History     student      Social History Main Topics     Smoking status: Current Every Day Smoker     Packs/day: 0.50     Types: Cigarettes     Smokeless tobacco: Never Used      Comment: sometimes     Alcohol use No     Drug use: No     Sexual activity: Yes     Partners: Male     Other Topics Concern     Parent/Sibling W/ Cabg, Mi Or Angioplasty Before 65f 55m? No     Social History Narrative                       EXAMINATION:  Gen:   No apparent distress  Neuro:   A&Ox3, no deficits  Psych:    Answering questions appropriately for age and situation with normal affect  Head:    NCAT  Eye:    Visual scanning without deficit  Ear:    Response to auditory stimuli wnl  Lung:    Non-labored breathing on RA noted  Abd:    NTND per patient report  Lymph:    Mild edema anterolateral R ankle, eccymosis anterior ankle noted.    Vasc:    Pulses palpable, CFT minimally delayed  Neuro:    Light touch sensation intact to all sensory nerve distributions.  She notes paresthesias to anterior ankle and dorsal foot with inversion of the foot as well as with tib-fib compression  Derm:    Neg for nodules, lesions or ulcerations  MSK:    R ankle - knee-to-ankle causes paresthesias anterior ankle/dorsal foot, and some tenderness along the course of the fibula, but minimal appreciable syndesmotic pain noted.  Tender ATFL < CFL.  No deltoid pain.  No anterior calcaneus or 5th met base pain.  Anterior drawer and talar tilt demonstrate CFL > ATFL pain and guarding.    Calf:    Neg for redness, swelling or tenderness      Imaging:  xrays R ankle 6/12/18 - IMPRESSION: No  evidence of fracture. The ankle mortise is congruent.    Assessment:  27 year old female with inversion R ankle sprain      Plan:  Discussed etiologies, anatomy and options  1.  Inversion R ankle sprain, involving ATFL and CFL  -personally reviewed imaging with patient  -transition from CAM to trilok ankle brace as able; brace dispensed today  -RICE/NSAID prn  -tensogrip dispensed for edema control  -[facility] PT referral for functional rehab  -MRI if limited improvement.    Follow up:  After PT or sooner with acute issues      Patient's medical history was reviewed today    Body mass index is 33.47 kg/(m^2).  Weight management plan: Patient was referred to their PCP to discuss a diet and exercise plan.        Hayden Bolton DPM   Podiatric Foot & Ankle Surgeon  [facility]  [phone]

## 2018-06-21 NOTE — LETTER
"    6/21/2018         RE: Michelle Joshua  [address] [facility] [address]        Dear Colleague,    Thank you for referring your patient, Michelle Joshua, to the [facility]. Please see a copy of my visit note below.    Foot & Ankle Surgery  June 21, 2018    CC: \"ankle injury\"    I was asked to see Michelle Joshua regarding the chief complaint by:  Dr. SHEEBA Gutierrez    HPI:  Pt is a 27 year old female who presents with above complaint.  Slipped in her kitchen 10 days ago.  She was seen in the ER 9 days ago.  xrays were neg for fracture.  She's been WBAT in the CAM.  She feels some tightness in her calf.  Describes a deep ache, throbbing, tingling pain.  She was diagnosed with an ankle sprain and made WBAT in the boot in the ER.  Dr Youssef had concerns that it may be more than a sprain and advised follow up.  Patient notes bruising to the anterior ankle.  Pain increases with increased time on her feet.    ROS:   Pos for CC.  The patient denies current nausea, vomiting, chills, fevers, belly pain, calf pain, chest pain or SOB.  Complete remainder of ROS is otherwise neg.    VITALS:    Vitals:    06/21/18 1558   BP: 140/88   Weight: 183 lb (83 kg)   Height: 5' 2\" (1.575 m)       PMH:    Past Medical History:   Diagnosis Date     Uncomplicated asthma      Unspecified otitis media     Otitis Media as a child       SXHX:    Past Surgical History:   Procedure Laterality Date     HC CREATE EARDRUM OPENING,GEN ANESTH      P.E. Tubes     HC REMOVE TONSILS/ADENOIDS,<10 Y/O      T & A <11y.o.        MEDS:    Current Outpatient Prescriptions   Medication     albuterol (PROAIR HFA/PROVENTIL HFA/VENTOLIN HFA) 108 (90 Base) MCG/ACT Inhaler     albuterol (PROAIR HFA/PROVENTIL HFA/VENTOLIN HFA) 108 (90 BASE) MCG/ACT Inhaler     EPINEPHrine (EPIPEN/ADRENACLICK/OR ANY BX GENERIC EQUIV) 0.3 MG/0.3ML injection 2-pack     fexofenadine-pseudoePHEDrine (ALLEGRA-D 24) 180-240 MG per 24 hr tablet     fluticasone " (FLONASE) 50 MCG/ACT spray     medroxyPROGESTERone (DEPO-PROVERA) 150 MG/ML injection     naproxen (NAPROSYN) 500 MG tablet     [DISCONTINUED] fluticasone (FLOVENT DISKUS) 100 MCG/BLIST AEPB     No current facility-administered medications for this visit.        ALL:     Allergies   Allergen Reactions     Amoxicillin Hives     Bees      Penicillins      Sulfa Drugs        FMH:    Family History   Problem Relation Age of Onset     Hypertension Mother        SocHx:    Social History     Social History     Marital status: Single     Spouse name: N/A     Number of children: 0     Years of education: 10     Occupational History     student      Social History Main Topics     Smoking status: Current Every Day Smoker     Packs/day: 0.50     Types: Cigarettes     Smokeless tobacco: Never Used      Comment: sometimes     Alcohol use No     Drug use: No     Sexual activity: Yes     Partners: Male     Other Topics Concern     Parent/Sibling W/ Cabg, Mi Or Angioplasty Before 65f 55m? No     Social History Narrative                       EXAMINATION:  Gen:   No apparent distress  Neuro:   A&Ox3, no deficits  Psych:    Answering questions appropriately for age and situation with normal affect  Head:    NCAT  Eye:    Visual scanning without deficit  Ear:    Response to auditory stimuli wnl  Lung:    Non-labored breathing on RA noted  Abd:    NTND per patient report  Lymph:    Mild edema anterolateral R ankle, eccymosis anterior ankle noted.    Vasc:    Pulses palpable, CFT minimally delayed  Neuro:    Light touch sensation intact to all sensory nerve distributions.  She notes paresthesias to anterior ankle and dorsal foot with inversion of the foot as well as with tib-fib compression  Derm:    Neg for nodules, lesions or ulcerations  MSK:    R ankle - knee-to-ankle causes paresthesias anterior ankle/dorsal foot, and some tenderness along the course of the fibula, but minimal appreciable syndesmotic pain noted.  Tender ATFL < CFL.   No deltoid pain.  No anterior calcaneus or 5th met base pain.  Anterior drawer and talar tilt demonstrate CFL > ATFL pain and guarding.    Calf:    Neg for redness, swelling or tenderness      Imaging:  xrays R ankle 6/12/18 - IMPRESSION: No evidence of fracture. The ankle mortise is congruent.    Assessment:  27 year old female with inversion R ankle sprain      Plan:  Discussed etiologies, anatomy and options  1.  Inversion R ankle sprain, involving ATFL and CFL  -personally reviewed imaging with patient  -transition from CAM to trilok ankle brace as able; brace dispensed today  -RICE/NSAID prn  -tensogrip dispensed for edema control  -[facility] PT referral for functional rehab  -MRI if limited improvement.    Follow up:  After PT or sooner with acute issues      Patient's medical history was reviewed today    Body mass index is 33.47 kg/(m^2).  Weight management plan: Patient was referred to their PCP to discuss a diet and exercise plan.        Hayden Bolton DPM   Podiatric Foot & Ankle Surgeon  [facility]  [phone]      Again, thank you for allowing me to participate in the care of your patient.        Sincerely,        Hayden Bolton DPM, GUERO

## 2018-06-21 NOTE — TELEPHONE ENCOUNTER
Reason for Call:  Other call back    Detailed comments: Patient is needing a note sent over for ANSHU in regards to the appointment she was seen for today. Patient needs notes/plan from today's visit included as well. Fax number to send to is [phone]. Please include patient's case number [id number]. Please contact patient if you have any other questions. She would like this faxed over at your earliest convenience please. Thank you.    Phone Number Patient can be reached at: Home number on file [phone] (home)    Best Time: Anytime.    Can we leave a detailed message on this number? YES    Call taken on 6/21/2018 at 5:08 PM by Jorge Bonilla

## 2018-06-21 NOTE — LETTER
[facility]  [address]  [facility] [id number] [phone]          June 22, 2018    RE:  Michelle Joshua                                                                                                                                                       [address] [facility] [address]            To whom it may concern:    Michelle Joshua is under my professional care and was seen yesterday, 6/21/18, for Sprain of anterior talofibular ligament of right ankle, initial encounter.  She will transition from the walking boot to an ankle brace as tolerated, and I referred her to physical therapy for functional rehabilitation of the ankle.      Restrictions that I recommended included utilizing the ankle brace, and I would recommend icing, elevating and resting the foot every 3 hours x 15 minutes as needed for pain.      She will follow up with me after physical therapy for re-evaluation.    Thank you        Hayden Bolton DPM  Podiatric Foot & Ankle Surgeon  [facility]

## 2018-06-21 NOTE — MR AVS SNAPSHOT
After Visit Summary   6/21/2018    Michelle Joshua    MRN: [id number]           Patient Information     Date Of Birth          1990        Visit Information        Provider Department      6/21/2018 4:00 PM Hayden Altman DPM [facility]        Today's Diagnoses     Sprain of anterior talofibular ligament of right ankle, initial encounter    -  1      Care Instructions    Thank you for choosing [facility]!    DR. ALTMAN'S CLINIC LOCATIONS:   MONDAY - [place] TUESDAY - [place]   [address] [place] [address]  [address]   [place] [address] [place] [address]   [phone]       THURSDAY AM - [place] THURSDAY PM - [facility]   [address]   [place] [address] [place] [address]   [phone]       FRIDAY AM - [place] SET UP SURGERY: [phone] [address] APPOINTMENTS: [phone]   [place] [address] BILLING QUESTIONS: [phone] FAX NUMBER: [phone]     Follow Up:  After PT    ANKLE SPRAIN  An ankle sprain is an injury to one or more ligaments in the ankle, usually on the outside of the ankle. Ligaments are bands of tissue - like rubber bands - that connect one bone to another and bind the joints together. In the ankle joint, ligaments provide stability by limiting side-to-side movement.  Some ankle sprains are much worse than others. The severity of an ankle sprain depends on whether the ligament is stretched, partially torn, or completely torn, as well as on the number of ligaments involved. Ankle sprains are not the same as strains, which affect muscles rather than ligaments.  CAUSES  Sprained ankles often result from a fall, a sudden twist, or a blow that forces the ankle joint out of its normal position. Ankle sprains commonly occur while participating in sports, wearing inappropriate shoes, or walking or running on an uneven  surface.  Sometimes ankle sprains occur because of a person is born with weak ankles. Previous ankle or foot injuries can also weaken the ankle and lead to sprains.  SYMPTOMS  The symptoms of ankle sprains may include: pain or soreness, swelling, bruising, difficulty walking, and stiffness in the joint.  These symptoms may vary in intensity, depending on the severity of the sprain. Sometimes pain and swelling are absent in people with previous ankle sprains. Instead, they may simply feel the ankle is wobbly and unsteady when they walk. Even if there is no pain or swelling with a sprained ankle, treatment is crucial. Any ankle sprain - whether it s your first or your fifth - requires prompt medical attention.  DIAGNOSIS  In evaluating your injury, the foot and ankle surgeon will obtain a thorough history of your symptoms and examine your foot. X-rays or other advanced imaging studies may be ordered to help determine the severity of the injury.  MEDICAL TREATMENT  There are four key reasons why an ankle sprain should be promptly evaluated and treated by a foot and ankle surgeon:  An untreated ankle sprain may lead to chronic ankle instability, a condition marked by persistent discomfort and a  giving way  of the ankle. Weakness in the leg may also develop.   A more severe ankle injury may have occurred along with the sprain. This might include a serious bone fracture that, if left untreated, could lead to troubling complications.   An ankle sprain may be accompanied by a foot injury that causes discomfort but has gone unnoticed thus far.   Rehabilitation of a sprained ankle needs to begin right away. If rehabilitation is delayed, the injury may be less likely to heal properly.   NON-SURGICAL TREATMENT  When you have an ankle sprain, rehabilitation is crucial--and it starts the moment your treatment begins. Your foot and ankle surgeon may recommend one or more of the following treatment options:  Rest. Stay off the  injured ankle. Walking may cause further injury.   Ice. Apply an ice pack to the injured area, placing a thin towel between the ice and the skin. Use ice for 20 minutes and then wait at least 40 minutes before icing again.   Compression. An elastic wrap may be recommended to control swelling.   Elevation. The ankle should be raised slightly above the level of your heart to reduce swelling.   Early physical therapy. Your doctor will start you on a rehabilitation program as soon as possible to promote healing and increase your range of motion. This includes doing prescribed exercises.   Medications. Nonsteroidal anti-inflammatory drugs (NSAIDs), such as ibuprofen, may be recommended to reduce pain and inflammation. In some cases, prescription pain medications are needed to provide adequate relief.   SURGICAL TREATMENT  In more severe cases, surgery may be required to adequately treat an ankle sprain. Surgery often involves repairing the damaged ligament or ligaments. The foot and ankle surgeon will select the surgical procedure best suited for your case based on the type and severity of your injury as well as your activity level.  After surgery, rehabilitation is extremely important. Completing your rehabilitation program is crucial to a successful outcome. Be sure to continue to see your foot and ankle surgeon during this period to ensure that your ankle heals properly and function is restored.    PRICE THERAPY  Many aches and pains throughout the foot and ankle can be helped with many simple treatments. This is usually described as PRICE Therapy.      P - Protection - often times, inflammation/pain in the lower extremity is not able to improve simply because the areas involved are never allowed to rest. Every step we take can bother the problematic area. Protecting those areas is an important step in the healing process. This may involve a walking cast boot, a special insert/orthotic device, an ankle brace, or  simply avoiding barefoot walking.    R - Rest - in addition to protecting the foot/ankle, resting is an important, but often times difficult, treatment option. Getting off your feet when they bother you, and specifically avoiding activities that cause pain/discomfort, are very beneficial to prevent, and treat, foot/ankle pain.      I - Ice - icing regularly can help to decrease inflammation and swelling in the foot, thus decreasing pain. Using an ice pack or a bag of frozen veggies works very well. Ice for 20 minutes multiple times per day as needed.  Do not place the ice directly on the skin as this can cause tissue damage.    C - Compression - using a compression wrap or an ACE wrap can help to decrease swelling, which can help to decrease pain. Wearing the wraps is generally not needed at night, but they should be worn on a regular basis when you are going to be on your feet for prolonged periods as gravity tends to pull fluids down to your feet/ankles.    E - Elevation - elevating your lower extremities multiple times daily for 15-20 minutes can help to decrease swelling, which works well in decreasing pain levels.    NSAID/Tylenol - Anti-inflammatories like Aleve or ibuprofen, and/or a pain medication, such as Tylenol, can help to improve pain levels and get the issue resolved sooner rather than later. Anyone with liver issues should be careful with Tylenol, and anyone with high blood pressure or heart, stomach or kidney issues should be careful with anti-inflammatories. Please ask if you have questions about these medications, including dosage.      Body Mass Index (BMI)  Many things can cause foot and ankle problems. Foot structure, activity level, foot mechanics and injuries are common causes of pain. One very important issue that often goes unmentioned, is body weight. Extra weight can cause increased stress on muscles, ligaments, bones and tendons. Sometimes just a few extra pounds is all it takes to put  one over her/his threshold. Without reducing that stress, it can be difficult to alleviate pain. Some people are uncomfortable addressing this issue, but we feel it is important for you to think about it. As Foot &  Ankle specialists, our job is addressing the lower extremity problem and possible causes. Regarding extra body weight, we encourage patients to discuss diet and weight management plans with their primary care doctors. It is this team approach that gives you the best opportunity for pain relief and getting you back on your feet.            Follow-ups after your visit        Additional Services     [facility] PT, HAND, AND CHIROPRACTIC REFERRAL       === This order will print in the [facility] Scheduling  Office ===    Physical therapy, hand therapy and chiropractic care are available through:    [facility]  [facility]    Call one easy number to schedule at any of the above locations:  [phone].    Your provider has referred you to Physical Therapy at [facility] or [facility]    Indication/Reason for Referral: Ankle Pain  Onset of Illness:  Ankle sprain 10 days ago, inversion mechanism  Therapy Orders:  Evaluate and Treat  Special Programs:  None  Special Request:  Equipment: As Indicated:   Exercise: Active/Assistive ROM, Conditioning, Home Exercise Program, Passive ROM, Posture/Body Mechanics, Progressive Strengthening and Stretching/Flexibility  Manual Therapy: Joint Mobilization  Modalities: As Indicated: , Iontophoresis (Please Order: Dexamethasone Sodium Phosphate - 4mg/ml injectable, 30 cc total volume) and Ultrasound    Additional Comments for the therapist or chiropractor:  8 sessions    Please be aware that coverage of these services is subject to the terms and limitations of your health insurance plan.  Call member services at your health plan with any benefit or coverage questions.      Please bring the following to your appointment:    >>   Your  "personal calendar for scheduling future appointments  >>   Comfortable clothing                  Who to contact     If you have questions or need follow up information about today's clinic visit or your schedule please contact [facility] directly at [phone].  Normal or non-critical lab and imaging results will be communicated to you by [employer], letter or phone within 4 business days after the clinic has received the results. If you do not hear from us within 7 days, please contact the clinic through [employer] or phone. If you have a critical or abnormal lab result, we will notify you by phone as soon as possible.  Submit refill requests through [employer] or call your pharmacy and they will forward the refill request to us. Please allow 3 business days for your refill to be completed.          Additional Information About Your Visit        [employer] Information     [employer] gives you secure access to your electronic health record. If you see a primary care provider, you can also send messages to your care team and make appointments. If you have questions, please call your primary care clinic.  If you do not have a primary care provider, please call [phone] and they will assist you.        Care EveryWhere ID     This is your Care EveryWhere ID. This could be used by other organizations to access your [employer] medical records  [id number]        Your Vitals Were     Height BMI (Body Mass Index)                5' 2\" (1.575 m) 33.47 kg/m2           Blood Pressure from Last 3 Encounters:   06/21/18 140/88   06/19/18 149/88   06/12/18 138/89    Weight from Last 3 Encounters:   06/21/18 183 lb (83 kg)   06/19/18 183 lb (83 kg)   06/12/18 191 lb (86.6 kg)              We Performed the Following     [facility] PT, HAND, AND CHIROPRACTIC REFERRAL          Today's Medication Changes          These changes are accurate as of 6/21/18  4:22 PM.  If you have any questions, ask your nurse or doctor.               Start " taking these medicines.        Dose/Directions    dexamethasone 4 MG/ML injection   Commonly known as:  DECADRON   Used for:  Sprain of anterior talofibular ligament of right ankle, initial encounter   Started by:  Hayden Bolton DPM        To be used topically by therapist during PT sessions.   Quantity:  30 mL   Refills:  0       order for DME   Used for:  Sprain of anterior talofibular ligament of right ankle, initial encounter   Started by:  Hayden Bolton DPM        Equipment being ordered: size 7 trilok brace   Quantity:  1 Device   Refills:  0            Where to get your medicines      Some of these will need a paper prescription and others can be bought over the counter.  Ask your nurse if you have questions.     Bring a paper prescription for each of these medications     order for DME       You don't need a prescription for these medications     dexamethasone 4 MG/ML injection                Primary Care Provider Office Phone # Fax #    Aurora Youssef [phone] [address] [address]        Equal Access to Services     RENE JAMES : Hadii aad ku hadasho Soomaali, waaxda luqadaha, qaybta kaalmada adeegyada, waxay idiin hayurbanon bro gao . So [facility] [place] [facility] [phone].    ATENCIÓN: Si habla español, tiene a balderas disposición servicios gratuitos de asistencia lingüística. [facility] [phone].    We comply with applicable federal civil rights laws and [place] laws. We do not discriminate on the basis of race, color, national origin, age, disability, sex, sexual orientation, or gender identity.            Thank you!     Thank you for choosing [facility]  for your care. Our goal is always to provide you with excellent care. Hearing back from our patients is one way we can continue to improve our services. Please take a few minutes to complete the written survey that you may receive in the mail after your visit with us. Thank you!              Your Updated Medication List - Protect others around you: Learn how to safely use, store and throw away your medicines at www.disposemymeds.org.          This list is accurate as of 6/21/18  4:22 PM.  Always use your most recent med list.                   Brand Name Dispense Instructions for use Diagnosis    * albuterol 108 (90 Base) MCG/ACT Inhaler    PROAIR HFA/PROVENTIL HFA/VENTOLIN HFA    1 Inhaler    Inhale 2 puffs into the lungs every 6 hours as needed for shortness of breath / dyspnea or wheezing    Viral URI with cough       * albuterol 108 (90 Base) MCG/ACT Inhaler    PROAIR HFA/PROVENTIL HFA/VENTOLIN HFA    1 Inhaler    Inhale 2 puffs into the lungs every 6 hours as needed for shortness of breath / dyspnea or wheezing    Mild intermittent asthma without complication       dexamethasone 4 MG/ML injection    DECADRON    30 mL    To be used topically by therapist during PT sessions.    Sprain of anterior talofibular ligament of right ankle, initial encounter       EPINEPHrine 0.3 MG/0.3ML injection 2-pack    EPIPEN/ADRENACLICK/or ANY BX GENERIC EQUIV    0.6 mL    Inject 0.3 mLs (0.3 mg) into the muscle once as needed for anaphylaxis    Bee sting-induced anaphylaxis, accidental or unintentional, sequela       fexofenadine-pseudoePHEDrine 180-240 MG per 24 hr tablet    ALLEGRA-D 24    14 tablet    Take 1 tablet by mouth daily    Viral URI with cough       fluticasone 50 MCG/ACT spray    FLONASE    1 Bottle    Spray 1-2 sprays into both nostrils daily    Environmental allergies       medroxyPROGESTERone 150 MG/ML injection    DEPO-PROVERA    1 mL    Inject 1 mL (150 mg) into the muscle every 3 months    Depo-Provera contraceptive status, Encounter for initial prescription of injectable contraceptive       naproxen 500 MG tablet    NAPROSYN    60 tablet    Take 1 tablet (500 mg) by mouth 2 times daily (with meals)        order for DME     1 Device    Equipment being ordered: size 7 trilok brace     Sprain of anterior talofibular ligament of right ankle, initial encounter       * Notice:  This list has 2 medication(s) that are the same as other medications prescribed for you. Read the directions carefully, and ask your doctor or other care provider to review them with you.

## 2018-06-22 ENCOUNTER — TELEPHONE (OUTPATIENT)
Dept: PODIATRY | Facility: CLINIC | Age: 28
End: 2018-06-22

## 2018-06-22 ENCOUNTER — TELEPHONE (OUTPATIENT)
Dept: FAMILY MEDICINE | Facility: CLINIC | Age: 28
End: 2018-06-22

## 2018-06-22 NOTE — TELEPHONE ENCOUNTER
I called and LVM for Michelle.  i'll be finishing up her note this morning from yesterday's encounter and we'll be sending the documentation to the requested number.  I also advised her to call back and let me know availability for the duplex scan of her right calf that we had talked about yesterday.    JAZ UmanaM  Podiatric Foot & Ankle Surgeon  [facility]  [phone]

## 2018-06-22 NOTE — LETTER
[facility]  [address]  [facility] [address]  [phone]          June 25, 2018    RE:  Michelle Joshua                                                                                                                                                       [address] [facility] [address]    Claim number - [id number]        To whom it may concern:    Michelle Joshua is under my professional care and was seen yesterday, 6/21/18, for Sprain of anterior talofibular ligament of right ankle, initial encounter.  She will transition from the walking boot to an ankle brace as tolerated, and I referred her to physical therapy for functional rehabilitation of the ankle.       Restrictions that I recommended included utilizing the ankle brace and crutch, and I would recommend icing, elevating and resting the foot every 3 hours x 15 minutes as needed for pain.       She will follow up with me after physical therapy for re-evaluation and we anticipate return to work 7/16/18

## 2018-06-22 NOTE — TELEPHONE ENCOUNTER
Pt would like to have this done at [place].  She called the [facility] and they informed her the order is not in the chart yet    Patient can be reached at     Home Phone [phone]   Mobile [phone]     It is ok to leave a detailed message

## 2018-06-22 NOTE — TELEPHONE ENCOUNTER
.Reason for Call:  Other letter    Detailed comments: patient had gotten a letter from Dr Bolton for her work, but she needs an estimated return to work date added to it and have it faxed to the same place.  She is also asking for a call back.     Phone Number Patient can be reached at: Home number on file [phone] (home)    Best Time: any    Can we leave a detailed message on this number? YES    Call taken on 6/22/2018 at 4:46 PM by Genie Norman

## 2018-06-22 NOTE — TELEPHONE ENCOUNTER
I called and spoke with Michelle.  She is available 6/25/18.  Order placed for US venous duplex right lower extremity.      Hayden Bolton DPM   Podiatric Foot & Ankle Surgeon  [facility]  [phone]

## 2018-06-25 ENCOUNTER — THERAPY VISIT (OUTPATIENT)
Dept: PHYSICAL THERAPY | Facility: CLINIC | Age: 28
End: 2018-06-25
Attending: PODIATRIST
Payer: COMMERCIAL

## 2018-06-25 DIAGNOSIS — M25.571 PAIN IN JOINT INVOLVING ANKLE AND FOOT, RIGHT: Primary | ICD-10-CM

## 2018-06-25 PROCEDURE — 97161 PT EVAL LOW COMPLEX 20 MIN: CPT | Mod: GP | Performed by: PHYSICAL THERAPIST

## 2018-06-25 PROCEDURE — 97110 THERAPEUTIC EXERCISES: CPT | Mod: GP | Performed by: PHYSICAL THERAPIST

## 2018-06-25 NOTE — PROGRESS NOTES
Physical Therapy Initial Examination/Evaluation  June 25, 2018    Michelle Joshua is a 27 year old female referred to physical therapy by Hayden Wang DPM for treatment of R ankle sprain with Precautions/Restrictions/MD instructions to evaluate and treat, iontophoresis (no order yet for dexamethasone in pharmacy per patient)      Subjective:  DOI/onset: 6-12-18 DOS: none  Acute Injury or Gradual Onset?: Acute injury onset  Mechanism of Injury: slipped, inverted ankle  Related PMH: none Previous Treatment: Crutches (coudn't use due to back), didn't use an AD Effect of prior treatment: n/a  Imaging: x-ray, negative  Chief Complaint/Functional Limitations:   Walking:  Difficult, needs to walk on medial foot, getting pains in  the outer calf.    R knee starting to hurt as well.  Ankle hurts when she moves it. 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  Independent transfers, tries not to weight bear on R.  Knee motion normal, hip motion normal  System    Ankle/Foot Evaluation  ROM:  AROM is normal (Left).  AROM:    Dorsiflexion: Left:    Right:   5 degrees plantar flexed  Plantarflexion: Left:     Right:  20  Inversion: Left:      Right:  15  Eversion:     Right:  5  Great toe flexion: Left:      Right:  Difficulty, limited control initially  Great Toe Extension: Left:      Right: able  PROM:    Dorsiflexion: Left:        Right:   0   Plantarflexion: Left:        Right:  20  Inversion: Left:          Right:   20  Eversion: Left:      Right:  8  Great Toe Flexion: Left:      Right:  Functional    Great Toe Extension: Left:        Right: functional  Pain: approaching end ranges all motions  Endfeel:  Somewhat guarded, not tested vigorously        PALPATION: Palpation of ankle: no significant warmth.    Right ankle tenderness present at:   gastroc/soleus; anterior talofibular ligament; posterior talofibular ligament; calcaneofibular ligament and lateral malleolus  EDEMA: Edema ankle: slight, slightly discolored.    Right ankle edema present at:  lateral        MOBILITY TESTING:         Subtalar Right: normal  Midtarsal Right: normal  First Ray Right: normal  FUNCTIONAL TESTS: not assessed                                                          Strength:  Able to contract very lightly PF, DF, EV, INV, if more than very gentle reported overall soreness    General     ROS    Assessment/Plan:    Patient is a 27 year old female with right ankle complaints.    Clinical impression:  Ms. Joshua presents with a subacute ankle sprain with swelling, limited active and passive motion, poor gait pattern, pain with weight bearing, very poor strength, increasing pain through the day even when resting.  She was instructed in use of crutch(es) and will begin to use to prevent increasing pain with poor gait pattern and  to allow the ankle to rest. Do not feel that she would benefit from returning to work at this time given the job requirements of standing all day, lifting/carrying heavy buckets.    Patient has the following significant findings with corresponding treatment plan.                Diagnosis 1:  R ankle sprain, subacute    Pain -  hot/cold therapy, splint/taping/bracing/orthotics, self management, education, home program and per order of MD consider iontophoresis  Decreased ROM/flexibility - manual therapy, therapeutic exercise and home program  Decreased strength - therapeutic exercise and therapeutic activities  Impaired balance - neuro re-education, therapeutic activities and home program  Edema - vasopneumatics, cold therapy and self management/home program  Impaired gait - gait training and home program  Impaired muscle performance - neuro re-education  Decreased function - therapeutic activities    Therapy Evaluation Codes:   1) History comprised of:   Personal factors that impact the plan of care:      None.    Comorbidity factors that impact the plan of care are:      Asthma.     Medications impacting care: None.  2) Examination of Body Systems comprised of:   Body structures and functions that impact the plan of care:      Ankle.   Activity limitations that impact the plan of care are:      Driving, Lifting, Squatting/kneeling, Stairs, Standing, Walking and Working.  3) Clinical presentation characteristics are:   Stable/Uncomplicated.  4) Decision-Making    Low complexity using standardized patient assessment instrument and/or measureable assessment of functional outcome.  Cumulative Therapy Evaluation is: Low complexity.    Previous and current functional limitations:  (See Goal Flow Sheet for this information)    Short term and Long term goals: (See Goal Flow Sheet for this information)     Communication ability:  Patient appears to be able to clearly communicate and understand verbal and written  communication and follow directions correctly.  Treatment Explanation - The following has been discussed with the patient:   RX ordered/plan of care  Anticipated outcomes  Possible risks and side effects  This patient would benefit from PT intervention to resume normal activities.   Rehab potential is good.    Frequency:  1 X week, once daily  Duration:  for 6 weeks  Discharge Plan:  Achieve all LTG.  Independent in home treatment program.  Reach maximal therapeutic benefit.    Please refer to the daily flowsheet for treatment today, total treatment time and time spent performing 1:1 timed codes.

## 2018-06-25 NOTE — TELEPHONE ENCOUNTER
Pt is calling back again about paperwork; Please call the pt back today. She needs the letter to have a return back to work date. She also had claim number to add to paperwork discussed below.   The claim number is :   [id number]

## 2018-06-25 NOTE — TELEPHONE ENCOUNTER
I called and spoke with Michelle.  New letter filled out with claim number and anticipated return to work date.  Will fax tomorrow first thing.    Hayden Bolton DPM   Podiatric Foot & Ankle Surgeon  [facility]  [phone]

## 2018-06-25 NOTE — TELEPHONE ENCOUNTER
Pt is calling back about paperwork. Please call the pt back today. She needs the letter to have a return back to work date.

## 2018-06-25 NOTE — MR AVS SNAPSHOT
After Visit Summary   6/25/2018    Michelle Joshua    MRN: [id number]           Patient Information     Date Of Birth          1990        Visit Information        Provider Department      6/25/2018 1:30 PM Pippa Alvarez, PT [facility]        Today's Diagnoses     Pain in joint involving ankle and foot, right    -  1       Follow-ups after your visit        Your next 10 appointments already scheduled     Jul 03, 2018  2:10 PM CDT   GOPAL Extremity with Pippa Alvarez PT   [facility] ([facility])    [address] [place] [id number] [phone]            Jul 10, 2018  1:10 PM CDT   GOPAL Extremity with Bigg Scott PT   [facility] ([facility])    [address] [place] [id number] [phone]            Jul 17, 2018  2:30 PM CDT   GOPAL Extremity with Bigg Scott PT   [facility] ([facility])    [address] [place] [id number] [phone]              Who to contact     If you have questions or need follow up information about today's clinic visit or your schedule please contact [facility] directly at [phone].  Normal or non-critical lab and imaging results will be communicated to you by MyChart, letter or phone within 4 business days after the clinic has received the results. If you do not hear from us within 7 days, please contact the clinic through MyChart or phone. If you have a critical or abnormal lab result, we will notify you by phone as soon as possible.  Submit refill requests through [employer] or call your pharmacy and they will forward the refill request to us. Please allow 3 business days for your refill to be completed.          Additional Information About Your Visit        MyChart Information      [facility] [employer] gives you secure access to your electronic health record. If you see a primary care provider, you can also send messages to your care team and make appointments. If you have questions, please call your primary care clinic.  If you do not have a primary care provider, please call [phone] and they will assist you.        Care EveryWhere ID     This is your Care EveryWhere ID. This could be used by other organizations to access your [place] medical records  [id number]         Blood Pressure from Last 3 Encounters:   06/21/18 140/88   06/19/18 149/88   06/12/18 138/89    Weight from Last 3 Encounters:   06/21/18 83 kg (183 lb)   06/19/18 83 kg (183 lb)   06/12/18 86.6 kg (191 lb)              We Performed the Following     GOPAL Inital Eval Report     PT Eval, Low Complexity (83386)     Therapeutic Exercises        Primary Care Provider Office Phone # Fax #    Aurora Youssef [phone] [address] [address]        Equal Access to Services     [facility]: Hadii aad ku hadasho Soomaali, waaxda luqadaha, qaybta kaalmada adejaxon, gladis gao . So [facility] [phone].    ATENCIÓN: Si habla español, tiene a balderas disposición servicios gratuitos de asistencia lingüística. [facility] [phone].    We comply with applicable federal civil rights laws and [place] laws. We do not discriminate on the basis of race, color, national origin, age, disability, sex, sexual orientation, or gender identity.            Thank you!     Thank you for choosing [facility]  for your care. Our goal is always to provide you with excellent care. Hearing back from our patients is one way we can continue to improve our services. Please take a few minutes to complete the written survey that you may receive in the mail after your visit with us. Thank you!             Your Updated Medication List - Protect others around you: Learn  how to safely use, store and throw away your medicines at www.disposemymeds.org.          This list is accurate as of 6/25/18  5:43 PM.  Always use your most recent med list.                   Brand Name Dispense Instructions for use Diagnosis    * albuterol 108 (90 Base) MCG/ACT Inhaler    PROAIR HFA/PROVENTIL HFA/VENTOLIN HFA    1 Inhaler    Inhale 2 puffs into the lungs every 6 hours as needed for shortness of breath / dyspnea or wheezing    Viral URI with cough       * albuterol 108 (90 Base) MCG/ACT Inhaler    PROAIR HFA/PROVENTIL HFA/VENTOLIN HFA    1 Inhaler    Inhale 2 puffs into the lungs every 6 hours as needed for shortness of breath / dyspnea or wheezing    Mild intermittent asthma without complication       dexamethasone 4 MG/ML injection    DECADRON    30 mL    To be used topically by therapist during PT sessions.    Sprain of anterior talofibular ligament of right ankle, initial encounter       EPINEPHrine 0.3 MG/0.3ML injection 2-pack    EPIPEN/ADRENACLICK/or ANY BX GENERIC EQUIV    0.6 mL    Inject 0.3 mLs (0.3 mg) into the muscle once as needed for anaphylaxis    Bee sting-induced anaphylaxis, accidental or unintentional, sequela       fexofenadine-pseudoePHEDrine 180-240 MG per 24 hr tablet    ALLEGRA-D 24    14 tablet    Take 1 tablet by mouth daily    Viral URI with cough       fluticasone 50 MCG/ACT spray    FLONASE    1 Bottle    Spray 1-2 sprays into both nostrils daily    Environmental allergies       medroxyPROGESTERone 150 MG/ML injection    DEPO-PROVERA    1 mL    Inject 1 mL (150 mg) into the muscle every 3 months    Depo-Provera contraceptive status, Encounter for initial prescription of injectable contraceptive       naproxen 500 MG tablet    NAPROSYN    60 tablet    Take 1 tablet (500 mg) by mouth 2 times daily (with meals)        order for DME     1 Device    Equipment being ordered: size 7 trilok brace    Sprain of anterior talofibular ligament of right ankle, initial encounter        * Notice:  This list has 2 medication(s) that are the same as other medications prescribed for you. Read the directions carefully, and ask your doctor or other care provider to review them with you.

## 2018-06-28 ENCOUNTER — HOSPITAL ENCOUNTER (OUTPATIENT)
Dept: ULTRASOUND IMAGING | Facility: CLINIC | Age: 28
Discharge: HOME | End: 2018-06-28
Attending: PODIATRIST | Admitting: PODIATRIST
Payer: COMMERCIAL

## 2018-06-28 DIAGNOSIS — M79.661 RIGHT CALF PAIN: ICD-10-CM

## 2018-06-28 PROCEDURE — 93971 EXTREMITY STUDY: CPT | Mod: RT

## 2018-06-28 NOTE — TELEPHONE ENCOUNTER
Letter printed again, will fax this afternoon.    Hayden Bolton DPM   Podiatric Foot & Ankle Surgeon  [facility]  [phone]

## 2018-06-28 NOTE — TELEPHONE ENCOUNTER
Patient called back.  The fax was never received.    Please refax to:  [phone]  Add patient name and claim number:  [id number]    Patient can be reached at:  [phone]  Ok to leave a detailed message at this number

## 2018-07-02 ENCOUNTER — TELEPHONE (OUTPATIENT)
Dept: FAMILY MEDICINE | Facility: CLINIC | Age: 28
End: 2018-07-02

## 2018-07-02 NOTE — TELEPHONE ENCOUNTER
Reason for Call:  Other call back    Detailed comments: patient is requesting a call back from podiatry regarding her return to work date.    Patient did schedule an appointment with Dr. Kate on 7/5, but she wants to make sure she is doing everything correctly.    Phone Number Patient can be reached at: Home number on file [phone] (home)    Best Time: anytime    Can we leave a detailed message on this number? YES    Call taken on 7/2/2018 at 12:32 PM by Corina Griggs  .

## 2018-07-03 ENCOUNTER — THERAPY VISIT (OUTPATIENT)
Dept: PHYSICAL THERAPY | Facility: CLINIC | Age: 28
End: 2018-07-03
Payer: COMMERCIAL

## 2018-07-03 DIAGNOSIS — M25.571 PAIN IN JOINT INVOLVING ANKLE AND FOOT, RIGHT: ICD-10-CM

## 2018-07-03 PROCEDURE — 97112 NEUROMUSCULAR REEDUCATION: CPT | Mod: GP | Performed by: PHYSICAL THERAPIST

## 2018-07-03 PROCEDURE — 97110 THERAPEUTIC EXERCISES: CPT | Mod: GP | Performed by: PHYSICAL THERAPIST

## 2018-07-05 ENCOUNTER — ALLIED HEALTH/NURSE VISIT (OUTPATIENT)
Dept: NURSING | Facility: CLINIC | Age: 28
End: 2018-07-05
Payer: COMMERCIAL

## 2018-07-05 ENCOUNTER — OFFICE VISIT (OUTPATIENT)
Dept: PODIATRY | Facility: CLINIC | Age: 28
End: 2018-07-05
Payer: COMMERCIAL

## 2018-07-05 VITALS
WEIGHT: 183 LBS | SYSTOLIC BLOOD PRESSURE: 136 MMHG | HEIGHT: 62 IN | BODY MASS INDEX: 33.68 KG/M2 | DIASTOLIC BLOOD PRESSURE: 88 MMHG

## 2018-07-05 DIAGNOSIS — Z30.42 ON DEPO-PROVERA FOR CONTRACEPTION: Primary | ICD-10-CM

## 2018-07-05 DIAGNOSIS — S93.491D SPRAIN OF ANTERIOR TALOFIBULAR LIGAMENT OF RIGHT ANKLE, SUBSEQUENT ENCOUNTER: Primary | ICD-10-CM

## 2018-07-05 PROCEDURE — 99207 ZZC NO CHARGE NURSE ONLY: CPT

## 2018-07-05 PROCEDURE — 99213 OFFICE O/P EST LOW 20 MIN: CPT | Performed by: PODIATRIST

## 2018-07-05 PROCEDURE — 96372 THER/PROPH/DIAG INJ SC/IM: CPT

## 2018-07-05 NOTE — PROGRESS NOTES
"Foot & Ankle Surgery   July 5, 2018    S:  Pt is seen today for evaluation of R ankle inversion injury.  She only has 3 PT sessions remaining.  She states the ankle is doing quite a bit better, kerri since she transitioned to the ankle brace.  She would like to return to work without restrictions 7/16/18.    Vitals:    07/05/18 1621   BP: 136/88   Weight: 183 lb (83 kg)   Height: 5' 2\" (1.575 m)   '      ROS - Pos for CC.  Patient denies current nausea, vomiting, chills, fevers, belly pain, calf pain, chest pain or SOB.  Complete remainder of ROS it otherwise neg.      PE:  Gen:   No apparent distress  Eye:    Visual scanning without deficit  Ear:    Response to auditory stimuli wnl  Lung:    Non-labored breathing on RA noted  Abd:    NTND per patient report  Lymph:   Mild residual edema anterolateral R ankle  Vasc:    Pulses palpable, CFT minimally delayed  Neuro:    Light touch sensation intact to all sensory nerve distributions without paresthesias  Derm:    Neg for nodules, lesions or ulcerations  MSK:    R ankle - still POP ATFL and CFL, but improved, and anterior drawer and talar tilt  @ ATFL/CFL, but no instability and pain levels improved  Calf:    Neg for redness, swelling or tenderness      Assessment:  27 year old female with inversion R ankle injury involving ATFL and CFL; improving      Plan:  Discussed etiologies, anatomy and options  1.  Inversion R ankle sprain involving ATFL and CFL, improving  -finish PT; continue HEP  -RICE/NSAID prn  -continue ankle brace; transition fully back to regular shoes as tolerated  -consider further PT and imaging if no further improvement is noted  -form filled out to RTW 7/16/18 without restrictions, per patient request    Follow up:  3-4 weeks/after PT or sooner with acute issues      Body mass index is 33.47 kg/(m^2).  Weight management plan: Patient was referred to their PCP to discuss a diet and exercise plan.         Hayden Bolton DPM "   Podiatric Foot & Ankle Surgeon  [facility]  [phone]

## 2018-07-05 NOTE — MR AVS SNAPSHOT
After Visit Summary   7/5/2018    Michelle Joshua    MRN: [id number]           Patient Information     Date Of Birth          1990        Visit Information        Provider Department      7/5/2018 4:15 PM Hayden Bolton DPM [facility] [place]        Today's Diagnoses     Sprain of anterior talofibular ligament of right ankle, subsequent encounter    -  1       Follow-ups after your visit        Follow-up notes from your care team     Return in about 3 weeks (around 7/26/2018).      Your next 10 appointments already scheduled     Jul 17, 2018  2:30 PM CDT   GOPAL Extremity with Bigg Scott, PT   [facility]    [address] [place] [id number] [phone]              Who to contact     If you have questions or need follow up information about today's clinic visit or your schedule please contact [facility] directly at [phone].  Normal or non-critical lab and imaging results will be communicated to you by Zilyohart, letter or phone within 4 business days after the clinic has received the results. If you do not hear from us within 7 days, please contact the clinic through Mobile Aut[employer]t or phone. If you have a critical or abnormal lab result, we will notify you by phone as soon as possible.  Submit refill requests through [employer] or call your pharmacy and they will forward the refill request to us. Please allow 3 business days for your refill to be completed.          Additional Information About Your Visit        MyChart Information     [employer] gives you secure access to your electronic health record. If you see a primary care provider, you can also send messages to your care team and make appointments. If you have questions, please call your primary care clinic.  If you do not have a primary care provider, please call [phone] and they will assist you.        Care EveryWhere ID     This is  "your Care EveryWhere ID. This could be used by other organizations to access your [place] medical records  [id number]        Your Vitals Were     Height BMI (Body Mass Index)                5' 2\" (1.575 m) 33.47 kg/m2           Blood Pressure from Last 3 Encounters:   07/05/18 136/88   06/21/18 140/88   06/19/18 149/88    Weight from Last 3 Encounters:   07/05/18 183 lb (83 kg)   06/21/18 183 lb (83 kg)   06/19/18 183 lb (83 kg)              Today, you had the following     No orders found for display       Primary Care Provider Office Phone # Fax #    Aurora Youssef [phone] [address] [address]        Equal Access to Services     [facility]: Hadii mary kelsey hadasho Sokev, waaxda luqadaha, qaybta kaalmada adeegyada, gladis gao . So [facility] [phone].    ATENCIÓN: Si habla español, tiene a balderas disposición servicios gratuitos de asistencia lingüística. [facility]ave[facility] al [phone].    We comply with applicable federal civil rights laws and [place] laws. We do not discriminate on the basis of race, color, national origin, age, disability, sex, sexual orientation, or gender identity.            Thank you!     Thank you for choosing [facility]  for your care. Our goal is always to provide you with excellent care. Hearing back from our patients is one way we can continue to improve our services. Please take a few minutes to complete the written survey that you may receive in the mail after your visit with us. Thank you!             Your Updated Medication List - Protect others around you: Learn how to safely use, store and throw away your medicines at www.disposemymeds.org.          This list is accurate as of 7/5/18 11:59 PM.  Always use your most recent med list.                   Brand Name Dispense Instructions for use Diagnosis    * albuterol 108 (90 Base) MCG/ACT Inhaler    PROAIR HFA/PROVENTIL HFA/VENTOLIN HFA    1 " Inhaler    Inhale 2 puffs into the lungs every 6 hours as needed for shortness of breath / dyspnea or wheezing    Viral URI with cough       * albuterol 108 (90 Base) MCG/ACT Inhaler    PROAIR HFA/PROVENTIL HFA/VENTOLIN HFA    1 Inhaler    Inhale 2 puffs into the lungs every 6 hours as needed for shortness of breath / dyspnea or wheezing    Mild intermittent asthma without complication       dexamethasone 4 MG/ML injection    DECADRON    30 mL    To be used topically by therapist during PT sessions.    Sprain of anterior talofibular ligament of right ankle, initial encounter       EPINEPHrine 0.3 MG/0.3ML injection 2-pack    EPIPEN/ADRENACLICK/or ANY BX GENERIC EQUIV    0.6 mL    Inject 0.3 mLs (0.3 mg) into the muscle once as needed for anaphylaxis    Bee sting-induced anaphylaxis, accidental or unintentional, sequela       fexofenadine-pseudoePHEDrine 180-240 MG per 24 hr tablet    ALLEGRA-D 24    14 tablet    Take 1 tablet by mouth daily    Viral URI with cough       fluticasone 50 MCG/ACT spray    FLONASE    1 Bottle    Spray 1-2 sprays into both nostrils daily    Environmental allergies       medroxyPROGESTERone 150 MG/ML injection    DEPO-PROVERA    1 mL    Inject 1 mL (150 mg) into the muscle every 3 months    Depo-Provera contraceptive status, Encounter for initial prescription of injectable contraceptive       naproxen 500 MG tablet    NAPROSYN    60 tablet    Take 1 tablet (500 mg) by mouth 2 times daily (with meals)        order for DME     1 Device    Equipment being ordered: size 7 trilok brace    Sprain of anterior talofibular ligament of right ankle, initial encounter       order for DME     1 Device    Equipment being ordered:    Sprain of anterior talofibular ligament of right ankle, initial encounter       * Notice:  This list has 2 medication(s) that are the same as other medications prescribed for you. Read the directions carefully, and ask your doctor or other care provider to review them with  you.

## 2018-10-17 ENCOUNTER — OFFICE VISIT (OUTPATIENT)
Dept: FAMILY MEDICINE | Facility: CLINIC | Age: 28
End: 2018-10-17
Payer: COMMERCIAL

## 2018-10-17 VITALS
BODY MASS INDEX: 32.74 KG/M2 | TEMPERATURE: 100.2 F | OXYGEN SATURATION: 98 % | SYSTOLIC BLOOD PRESSURE: 130 MMHG | HEART RATE: 78 BPM | WEIGHT: 179 LBS | DIASTOLIC BLOOD PRESSURE: 80 MMHG

## 2018-10-17 DIAGNOSIS — L73.9 FOLLICULITIS: Primary | ICD-10-CM

## 2018-10-17 DIAGNOSIS — Z30.013 ENCOUNTER FOR INITIAL PRESCRIPTION OF INJECTABLE CONTRACEPTIVE: ICD-10-CM

## 2018-10-17 LAB — BETA HCG QUAL IFA URINE: NEGATIVE

## 2018-10-17 PROCEDURE — 84703 CHORIONIC GONADOTROPIN ASSAY: CPT | Performed by: FAMILY MEDICINE

## 2018-10-17 PROCEDURE — 99213 OFFICE O/P EST LOW 20 MIN: CPT | Mod: 25 | Performed by: FAMILY MEDICINE

## 2018-10-17 PROCEDURE — 96372 THER/PROPH/DIAG INJ SC/IM: CPT | Performed by: FAMILY MEDICINE

## 2018-10-17 RX ORDER — CLINDAMYCIN HCL 150 MG
150 CAPSULE ORAL 3 TIMES DAILY
Qty: 21 CAPSULE | Refills: 0 | Status: SHIPPED | OUTPATIENT
Start: 2018-10-17 | End: 2020-10-15

## 2018-10-17 RX ORDER — MEDROXYPROGESTERONE ACETATE 150 MG/ML
150 INJECTION, SUSPENSION INTRAMUSCULAR
Qty: 1 ML | Refills: 1 | OUTPATIENT
Start: 2018-10-17 | End: 2020-10-15

## 2018-10-17 NOTE — NURSING NOTE
"Chief Complaint   Patient presents with     Imm/Inj     depo   9 days late  Mass in left armpit    initial BP (!) 150/94 (BP Location: Right arm, Cuff Size: Adult Large)  Pulse 78  Temp 100.2  F (37.9  C) (Oral)  Wt 179 lb (81.2 kg)  SpO2 98%  BMI 32.74 kg/m2 Estimated body mass index is 32.74 kg/(m^2) as calculated from the following:    Height as of 7/5/18: 5' 2\" (1.575 m).    Weight as of this encounter: 179 lb (81.2 kg).  BP completed using cuff size: large.  R arm      Health Maintenance that is potentially due pending provider review:  NONE    n/a    Ravinder Alexis ma  "

## 2018-10-17 NOTE — MR AVS SNAPSHOT
After Visit Summary   10/17/2018    Michelle Joshua    MRN: [id number]           Patient Information     Date Of Birth          1990        Visit Information        Provider Department      10/17/2018 3:40 PM Aurora Youssef MD [facility]        Today's Diagnoses     Folliculitis    -  1    Encounter for initial prescription of injectable contraceptive           Follow-ups after your visit        Follow-up notes from your care team     Return in about 4 weeks (around 11/14/2018).      Who to contact     If you have questions or need follow up information about today's clinic visit or your schedule please contact [facility] directly at [phone].  Normal or non-critical lab and imaging results will be communicated to you by MyChart, letter or phone within 4 business days after the clinic has received the results. If you do not hear from us within 7 days, please contact the clinic through [employer] or phone. If you have a critical or abnormal lab result, we will notify you by phone as soon as possible.  Submit refill requests through [employer] or call your pharmacy and they will forward the refill request to us. Please allow 3 business days for your refill to be completed.          Additional Information About Your Visit        MyChart Information     [employer] gives you secure access to your electronic health record. If you see a primary care provider, you can also send messages to your care team and make appointments. If you have questions, please call your primary care clinic.  If you do not have a primary care provider, please call [phone] and they will assist you.        Care EveryWhere ID     This is your Care EveryWhere ID. This could be used by other organizations to access your [place] medical records  [id number]        Your Vitals Were     Pulse Temperature Pulse Oximetry BMI (Body Mass Index)          78 100.2  F (37.9  C) (Oral) 98% 32.74 kg/m2          Blood Pressure from Last 3 Encounters:   10/17/18 130/80   07/05/18 136/88   06/21/18 140/88    Weight from Last 3 Encounters:   10/17/18 179 lb (81.2 kg)   07/05/18 183 lb (83 kg)   06/21/18 183 lb (83 kg)              We Performed the Following     Beta HCG Qual, Urine - [facility] and [place] ([id number])     INJECTION INTRAMUSCULAR OR SUB-Q          Today's Medication Changes          These changes are accurate as of 10/17/18  9:05 PM.  If you have any questions, ask your nurse or doctor.               Start taking these medicines.        Dose/Directions    clindamycin 150 MG capsule   Commonly known as:  CLEOCIN   Used for:  Folliculitis   Started by:  Aurora Youssef MD        Dose:  150 mg   Take 1 capsule (150 mg) by mouth 3 times daily   Quantity:  21 capsule   Refills:  0         These medicines have changed or have updated prescriptions.        Dose/Directions    * medroxyPROGESTERone 150 MG/ML injection   Commonly known as:  DEPO-PROVERA   This may have changed:  Another medication with the same name was added. Make sure you understand how and when to take each.   Used for:  Depo-Provera contraceptive status, Encounter for initial prescription of injectable contraceptive   Changed by:  Aurora Youssef MD        Dose:  150 mg   Inject 1 mL (150 mg) into the muscle every 3 months   Quantity:  1 mL   Refills:  1       * medroxyPROGESTERone 150 MG/ML injection   Commonly known as:  DEPO-PROVERA   This may have changed:  You were already taking a medication with the same name, and this prescription was added. Make sure you understand how and when to take each.   Used for:  Encounter for initial prescription of injectable contraceptive   Changed by:  Aurora Youssef MD        Dose:  150 mg   Inject 1 mL (150 mg) into the muscle every 3 months   Quantity:  1 mL   Refills:  1       * Notice:  This list has 2 medication(s) that are the same as other medications prescribed for you. Read the directions carefully, and ask  your doctor or other care provider to review them with you.         Where to get your medicines      These medications were sent to [id number] - [place], [place] - [address] AT [facility] [address]  [address] [facility], [facility] [address]     Phone:  [phone]     clindamycin 150 MG capsule         Some of these will need a paper prescription and others can be bought over the counter.  Ask your nurse if you have questions.     You don't need a prescription for these medications     medroxyPROGESTERone 150 MG/ML injection                Primary Care Provider Office Phone # Fax #    Aurora Youssef [phone] [address] [facility]   [place] [facility] [address]        Equal Access to Services     RENE JAMES : Khanh carranzao Sokev, waaxda luqadaha, qaybta kaalmada adeegyada, gladis gao . So [facility] [phone].    ATENCIÓN: Si habla español, tiene a balderas disposición servicios gratuitos de asistencia lingüística. Llame al [phone].    We comply with applicable federal civil rights laws and [place] laws. We do not discriminate on the basis of race, color, national origin, age, disability, sex, sexual orientation, or gender identity.            Thank you!     Thank you for choosing [facility]  for your care. Our goal is always to provide you with excellent care. Hearing back from our patients is one way we can continue to improve our services. Please take a few minutes to complete the written survey that you may receive in the mail after your visit with us. Thank you!             Your Updated Medication List - Protect others around you: Learn how to safely use, store and throw away your medicines at www.disposemymeds.org.          This list is accurate as of 10/17/18  9:05 PM.  Always use your most recent med list.                   Brand Name Dispense Instructions for use Diagnosis    * albuterol 108 (90 Base)  MCG/ACT inhaler    PROAIR HFA/PROVENTIL HFA/VENTOLIN HFA    1 Inhaler    Inhale 2 puffs into the lungs every 6 hours as needed for shortness of breath / dyspnea or wheezing    Viral URI with cough       * albuterol 108 (90 Base) MCG/ACT inhaler    PROAIR HFA/PROVENTIL HFA/VENTOLIN HFA    1 Inhaler    Inhale 2 puffs into the lungs every 6 hours as needed for shortness of breath / dyspnea or wheezing    Mild intermittent asthma without complication       clindamycin 150 MG capsule    CLEOCIN    21 capsule    Take 1 capsule (150 mg) by mouth 3 times daily    Folliculitis       dexamethasone 4 MG/ML injection    DECADRON    30 mL    To be used topically by therapist during PT sessions.    Sprain of anterior talofibular ligament of right ankle, initial encounter       EPINEPHrine 0.3 MG/0.3ML injection 2-pack    EPIPEN/ADRENACLICK/or ANY BX GENERIC EQUIV    0.6 mL    Inject 0.3 mLs (0.3 mg) into the muscle once as needed for anaphylaxis    Bee sting-induced anaphylaxis, accidental or unintentional, sequela       fexofenadine-pseudoePHEDrine 180-240 MG per 24 hr tablet    ALLEGRA-D 24    14 tablet    Take 1 tablet by mouth daily    Viral URI with cough       fluticasone 50 MCG/ACT spray    FLONASE    1 Bottle    Spray 1-2 sprays into both nostrils daily    Environmental allergies       * medroxyPROGESTERone 150 MG/ML injection    DEPO-PROVERA    1 mL    Inject 1 mL (150 mg) into the muscle every 3 months    Depo-Provera contraceptive status, Encounter for initial prescription of injectable contraceptive       * medroxyPROGESTERone 150 MG/ML injection    DEPO-PROVERA    1 mL    Inject 1 mL (150 mg) into the muscle every 3 months    Encounter for initial prescription of injectable contraceptive       naproxen 500 MG tablet    NAPROSYN    60 tablet    Take 1 tablet (500 mg) by mouth 2 times daily (with meals)        order for DME     1 Device    Equipment being ordered: size 7 trilok brace    Sprain of anterior talofibular  ligament of right ankle, initial encounter       order for DME     1 Device    Equipment being ordered:    Sprain of anterior talofibular ligament of right ankle, initial encounter       * Notice:  This list has 4 medication(s) that are the same as other medications prescribed for you. Read the directions carefully, and ask your doctor or other care provider to review them with you.

## 2018-10-17 NOTE — PROGRESS NOTES
SUBJECTIVE:   Michelle Joshua is a 27 year old female who presents to clinic today for the following health issues:      Depo late by 9 days - her home HCG has been normal and the done today HCG was negative , she will restart the Depo as she liked it as a BC option, no side effects   2)  lump left armpit- was bigger and painful a week ago but now seems to be getting smaller , has had it there for three weeks or so , she usually shaves in the axillary area , lump is on the   3) needs me to write a letter on her son Martha Little's son as they are being told they have to move to a different apartment to qualify for an affordable housing - he is currently being evaluated for possible Autistic spectrum disorder, he has an upcoming appointment but not soon enough , she would need the letter , stating that currently he is receiving support in his school and has an IEP plan in place which is helping with his anxiety , focus , attention. Also , he needs to have his own bedroom, he has a toddler brother .     Problem list and histories reviewed & adjusted, as indicated.  Additional history: as documented    Patient Active Problem List   Diagnosis     Allergic rhinitis     Other acne     CARDIOVASCULAR SCREENING; LDL GOAL LESS THAN 160     Nicotine addiction     Bee sting-induced anaphylaxis     Bunion of left foot     Mild intermittent asthma     Obesity     Hip pain, chronic     Myalgia and myositis     Supervision of other normal pregnancy, antepartum     Need for Tdap vaccination     Encounter for triage in pregnant patient     Group B Streptococcus carrier, +RV culture, currently pregnant     Pregnancy      (spontaneous vaginal delivery)     Contraception     Shoulder pain     Pain in joint involving ankle and foot, right     Past Surgical History:   Procedure Laterality Date     HC CREATE EARDRUM OPENING,GEN ANESTH      P.E. Tubes     HC REMOVE TONSILS/ADENOIDS,<12 Y/O      T & A <11y.o.       Social History    Substance Use Topics     Smoking status: Current Every Day Smoker     Packs/day: 0.50     Types: Cigarettes     Smokeless tobacco: Never Used      Comment: sometimes     Alcohol use No     Family History   Problem Relation Age of Onset     Hypertension Mother          Current Outpatient Prescriptions   Medication Sig Dispense Refill     clindamycin (CLEOCIN) 150 MG capsule Take 1 capsule (150 mg) by mouth 3 times daily 21 capsule 0     medroxyPROGESTERone (DEPO-PROVERA) 150 MG/ML injection Inject 1 mL (150 mg) into the muscle every 3 months 1 mL 1     albuterol (PROAIR HFA/PROVENTIL HFA/VENTOLIN HFA) 108 (90 Base) MCG/ACT Inhaler Inhale 2 puffs into the lungs every 6 hours as needed for shortness of breath / dyspnea or wheezing 1 Inhaler 0     albuterol (PROAIR HFA/PROVENTIL HFA/VENTOLIN HFA) 108 (90 BASE) MCG/ACT Inhaler Inhale 2 puffs into the lungs every 6 hours as needed for shortness of breath / dyspnea or wheezing 1 Inhaler 1     dexamethasone (DECADRON) 4 MG/ML injection To be used topically by therapist during PT sessions. 30 mL 0     EPINEPHrine (EPIPEN/ADRENACLICK/OR ANY BX GENERIC EQUIV) 0.3 MG/0.3ML injection 2-pack Inject 0.3 mLs (0.3 mg) into the muscle once as needed for anaphylaxis 0.6 mL 0     fexofenadine-pseudoePHEDrine (ALLEGRA-D 24) 180-240 MG per 24 hr tablet Take 1 tablet by mouth daily 14 tablet 0     fluticasone (FLONASE) 50 MCG/ACT spray Spray 1-2 sprays into both nostrils daily 1 Bottle 0     medroxyPROGESTERone (DEPO-PROVERA) 150 MG/ML injection Inject 1 mL (150 mg) into the muscle every 3 months 1 mL 1     naproxen (NAPROSYN) 500 MG tablet Take 1 tablet (500 mg) by mouth 2 times daily (with meals) 60 tablet 0     order for DME Equipment being ordered: 1 Device 0     order for DME Equipment being ordered: size 7 trilok brace 1 Device 0     [DISCONTINUED] fluticasone (FLOVENT DISKUS) 100 MCG/BLIST AEPB Inhale 1 puff (100 mcg) into the lungs 2 times daily 1 each 3     Allergies   Allergen  Reactions     Amoxicillin Hives     Bees      Penicillins      Sulfa Drugs      Recent Labs   Lab Test  01/31/17   2042  01/31/17   2031  11/15/16   1123  11/10/14   1045   ALT   --   58*  20   --    CR   --   0.66  0.72   --    GFRESTIMATED  >90  >90  Non African American GFR Calc    >90  Non  GFR Calc     --    GFRESTBLACK  >90  >90  African American GFR Calc    >90   GFR Calc     --    POTASSIUM   --   3.6  4.1   --    TSH   --    --    --   0.76      BP Readings from Last 3 Encounters:   10/17/18 (!) 150/94   07/05/18 136/88   06/21/18 140/88    Wt Readings from Last 3 Encounters:   10/17/18 179 lb (81.2 kg)   07/05/18 183 lb (83 kg)   06/21/18 183 lb (83 kg)                  Labs reviewed in EPIC    Reviewed and updated as needed this visit by clinical staff  Tobacco       Reviewed and updated as needed this visit by Provider         ROS:  Constitutional, HEENT, cardiovascular, pulmonary, GI, , musculoskeletal, neuro, skin, endocrine and psych systems are negative, except as otherwise noted.    OBJECTIVE:     BP (!) 150/94 (BP Location: Right arm, Cuff Size: Adult Large)  Pulse 78  Temp 100.2  F (37.9  C) (Oral)  Wt 179 lb (81.2 kg)  SpO2 98%  BMI 32.74 kg/m2  Body mass index is 32.74 kg/(m^2).  GENERAL: healthy, alert and no distress  EYES: Eyes grossly normal to inspection, PERRL and conjunctivae and sclerae normal  NECK: no adenopathy, no asymmetry, masses, or scars and thyroid normal to palpation  RESP: lungs clear to auscultation - no rales, rhonchi or wheezes  MS: no gross musculoskeletal defects noted, no edema  SKIN: no suspicious lesions or rashes EXCEPT there is a small about 0.8 cm in size palpable lymph node in the left axillary area , no erythema, it is soft , she has shaved the hair in that area     Diagnostic Test Results:  none     ASSESSMENT/PLAN:             1. Folliculitis  We discussed doing a course of ABx as this has been going on for over three  weeks now and although a bit smaller today , it is still painful per pt , she has multipe Abx allergies , will do the clindamycin and also take probiotics  - clindamycin (CLEOCIN) 150 MG capsule; Take 1 capsule (150 mg) by mouth 3 times daily  Dispense: 21 capsule; Refill: 0    2. Encounter for initial prescription of injectable contraceptive  Will restart the Depo as BC .  - medroxyPROGESTERone (DEPO-PROVERA) 150 MG/ML injection; Inject 1 mL (150 mg) into the muscle every 3 months  Dispense: 1 mL; Refill: 1  - INJECTION INTRAMUSCULAR OR SUB-Q    Also, I wrote a letter for her on behalf of her son Martha Little who is currently being evaluated for Autism Spectrum disorder , and has anxiety , ADD who is in a good school getting a lot of support and care and cannot move currently .  RTC if no improving or worsening.  Pt is aware  and comfortable with the current plan.      Aurora Youssef MD  [facility]

## 2018-10-17 NOTE — LETTER
[facility]  [address]  [address]  [place], [place] [address]  [phone]    October 17, 2018    RE:  Martha Teran                                                                                                                                                   [address] [facility] [address]            To whom it may concern:    Martha Teran is under my professional care and he has been a patient at [facility] since birth. He has medical concerns that have required him to be evaluated at the Autism Spectrum center with ongoing testing. At this point , I feel that he should remain in the same school as he is getting the necessary care and has an ongoing IEP , the school support team is good for him and any switching of schools will affect him in a negative way. Also , he needs to keep his own bedroom because of possibility for worsening issues if sharing a bedroom.      Sincerely,        Aurora Youssef MD      Clinic hours:  Monday 7:30 AM - 5:00 PM    Tuesday  7:00 AM - 7:00 PM    Wednesday  7:00 AM - 5:00 PM    Thursday  7:30 AM -  7:00 PM    Friday   7:30 AM -  5:00 PM

## 2018-11-10 ENCOUNTER — HEALTH MAINTENANCE LETTER (OUTPATIENT)
Age: 28
End: 2018-11-10

## 2018-11-14 ENCOUNTER — RESULT FOLLOW UP (OUTPATIENT)
Dept: FAMILY MEDICINE | Facility: CLINIC | Age: 28
End: 2018-11-14

## 2018-11-14 ENCOUNTER — OFFICE VISIT (OUTPATIENT)
Dept: FAMILY MEDICINE | Facility: CLINIC | Age: 28
End: 2018-11-14
Payer: COMMERCIAL

## 2018-11-14 DIAGNOSIS — T78.2XXS BEE STING-INDUCED ANAPHYLAXIS, ACCIDENTAL OR UNINTENTIONAL, SEQUELA: ICD-10-CM

## 2018-11-14 DIAGNOSIS — Z00.00 ENCOUNTER FOR ROUTINE ADULT HEALTH EXAMINATION WITHOUT ABNORMAL FINDINGS: Primary | ICD-10-CM

## 2018-11-14 DIAGNOSIS — D17.30 LIPOMA OF SKIN AND SUBCUTANEOUS TISSUE: ICD-10-CM

## 2018-11-14 DIAGNOSIS — Z91.09 ENVIRONMENTAL ALLERGIES: ICD-10-CM

## 2018-11-14 DIAGNOSIS — I10 BENIGN ESSENTIAL HYPERTENSION: ICD-10-CM

## 2018-11-14 DIAGNOSIS — J45.20 MILD INTERMITTENT ASTHMA WITHOUT COMPLICATION: ICD-10-CM

## 2018-11-14 DIAGNOSIS — R87.810 CERVICAL HIGH RISK HPV (HUMAN PAPILLOMAVIRUS) TEST POSITIVE: ICD-10-CM

## 2018-11-14 DIAGNOSIS — T63.441S BEE STING-INDUCED ANAPHYLAXIS, ACCIDENTAL OR UNINTENTIONAL, SEQUELA: ICD-10-CM

## 2018-11-14 PROCEDURE — 99214 OFFICE O/P EST MOD 30 MIN: CPT | Mod: 25 | Performed by: FAMILY MEDICINE

## 2018-11-14 PROCEDURE — G0145 SCR C/V CYTO,THINLAYER,RESCR: HCPCS | Performed by: FAMILY MEDICINE

## 2018-11-14 PROCEDURE — G0476 HPV COMBO ASSAY CA SCREEN: HCPCS | Performed by: FAMILY MEDICINE

## 2018-11-14 PROCEDURE — 99395 PREV VISIT EST AGE 18-39: CPT | Performed by: FAMILY MEDICINE

## 2018-11-14 RX ORDER — EPINEPHRINE 0.3 MG/.3ML
0.3 INJECTION SUBCUTANEOUS
Qty: 0.6 ML | Refills: 0 | Status: SHIPPED | OUTPATIENT
Start: 2018-11-14 | End: 2020-10-15

## 2018-11-14 RX ORDER — ALBUTEROL SULFATE 90 UG/1
2 AEROSOL, METERED RESPIRATORY (INHALATION) EVERY 6 HOURS PRN
Qty: 1 INHALER | Refills: 0 | Status: SHIPPED | OUTPATIENT
Start: 2018-11-14 | End: 2020-10-15

## 2018-11-14 RX ORDER — HYDROCHLOROTHIAZIDE 12.5 MG/1
12.5 TABLET ORAL DAILY
Qty: 30 TABLET | Refills: 1 | Status: SHIPPED | OUTPATIENT
Start: 2018-11-14 | End: 2020-10-15

## 2018-11-14 RX ORDER — FLUTICASONE PROPIONATE 50 MCG
1-2 SPRAY, SUSPENSION (ML) NASAL DAILY
Qty: 1 BOTTLE | Refills: 0 | Status: SHIPPED | OUTPATIENT
Start: 2018-11-14 | End: 2018-12-10

## 2018-11-14 NOTE — NURSING NOTE
"Chief Complaint   Patient presents with     Physical     initial BP (!) 149/92 (BP Location: Left arm, Cuff Size: Adult Large)  Pulse 106  Temp 98.2  F (36.8  C) (Oral)  Ht 5' 0.5\" (1.537 m)  Wt 176 lb (79.8 kg)  SpO2 98%  BMI 33.81 kg/m2 Estimated body mass index is 33.81 kg/(m^2) as calculated from the following:    Height as of this encounter: 5' 0.5\" (1.537 m).    Weight as of this encounter: 176 lb (79.8 kg).  BP completed using cuff size: regular.  L  arm      Health Maintenance that is potentially due pending provider review:  Pap Smear    PAP--Possibly completing today, per Provider review    Ravinder Alexis ma  "

## 2018-11-14 NOTE — LETTER
My Asthma Action Plan  Name: Michelle Joshua   YOB: 1990  Date: 11/14/2018   My doctor: Aurora Youssef MD   My clinic: [facility]        My Control Medicine: { :253765}  My Rescue Medicine: { :315111}  {AAP include Oral Steroid:179992} My Asthma Severity: { :457101}  Avoid your asthma triggers: { :868101}        {Is patient a child or adult?:763469}       GREEN ZONE   Good Control    I feel good    No cough or wheeze    Can work, sleep and play without asthma symptoms       Take your asthma control medicine every day.     1. If exercise triggers your asthma, take your rescue medication    15 minutes before exercise or sports, and    During exercise if you have asthma symptoms  2. Spacer to use with inhaler: If you have a spacer, make sure to use it with your inhaler             YELLOW ZONE Getting Worse  I have ANY of these:    I do not feel good    Cough or wheeze    Chest feels tight    Wake up at night   1. Keep taking your Green Zone medications  2. Start taking your rescue medicine:    every 20 minutes for up to 1 hour. Then every 4 hours for 24-48 hours.  3. If you stay in the Yellow Zone for more than 12-24 hours, contact your doctor.  4. If you do not return to the Green Zone in 12-24 hours or you get worse, start taking your oral steroid medicine if prescribed by your provider.           RED ZONE Medical Alert - Get Help  I have ANY of these:    I feel awful    Medicine is not helping    Breathing getting harder    Trouble walking or talking    Nose opens wide to breathe       1. Take your rescue medicine NOW  2. If your provider has prescribed an oral steroid medicine, start taking it NOW  3. Call your doctor NOW  4. If you are still in the Red Zone after 20 minutes and you have not reached your doctor:    Take your rescue medicine again and    Call 911 or go to the emergency room right away    See your regular doctor within 2 weeks of an Emergency Room or Urgent Care visit  for follow-up treatment.          Annual Reminders:  Meet with Asthma Educator,  Flu Shot in the Fall, consider Pneumonia Vaccination for patients with asthma (aged 19 and older).    Pharmacy: [facility] [place] [address] AT [address]                      Asthma Triggers  How To Control Things That Make Your Asthma Worse    Triggers are things that make your asthma worse.  Look at the list below to help you find your triggers and what you can do about them.  You can help prevent asthma flare-ups by staying away from your triggers.      Trigger                                                          What you can do   Cigarette Smoke  Tobacco smoke can make asthma worse. Do not allow smoking in your home, car or around you.  Be sure no one smokes at a child s day care or school.  If you smoke, ask your health care provider for ways to help you quit.  Ask family members to quit too.  Ask your health care provider for a referral to Quit Plan to help you quit smoking, or call 0-640-854-PLAN.     Colds, Flu, Bronchitis  These are common triggers of asthma. Wash your hands often.  Don t touch your eyes, nose or mouth.  Get a flu shot every year.     Dust Mites  These are tiny bugs that live in cloth or carpet. They are too small to see. Wash sheets and blankets in hot water every week.   Encase pillows and mattress in dust mite proof covers.  Avoid having carpet if you can. If you have carpet, vacuum weekly.   Use a dust mask and HEPA vacuum.   Pollen and Outdoor Mold  Some people are allergic to trees, grass, or weed pollen, or molds. Try to keep your windows closed.  Limit time out doors when pollen count is high.   Ask you health care provider about taking medicine during allergy season.     Animal Dander  Some people are allergic to skin flakes, urine or saliva from pets with fur or feathers. Keep pets with fur or feathers out of your home.    If you can t keep the pet  outdoors, then keep the pet out of your bedroom.  Keep the bedroom door closed.  Keep pets off cloth furniture and away from stuffed toys.     Mice, Rats, and Cockroaches  Some people are allergic to the waste from these pests.   Cover food and garbage.  Clean up spills and food crumbs.  Store grease in the refrigerator.   Keep food out of the bedroom.   Indoor Mold  This can be a trigger if your home has high moisture. Fix leaking faucets, pipes, or other sources of water.   Clean moldy surfaces.  Dehumidify basement if it is damp and smelly.   Smoke, Strong Odors, and Sprays  These can reduce air quality. Stay away from strong odors and sprays, such as perfume, powder, hair spray, paints, smoke incense, paint, cleaning products, candles and new carpet.   Exercise or Sports  Some people with asthma have this trigger. Be active!  Ask your doctor about taking medicine before sports or exercise to prevent symptoms.    Warm up for 5-10 minutes before and after sports or exercise.     Other Triggers of Asthma  Cold air:  Cover your nose and mouth with a scarf.  Sometimes laughing or crying can be a trigger.  Some medicines and food can trigger asthma.

## 2018-11-14 NOTE — MR AVS SNAPSHOT
After Visit Summary   11/14/2018    Michelle Joshua    MRN: [id number]           Patient Information     Date Of Birth          1990        Visit Information        Provider Department      11/14/2018 1:40 PM Aurora Youssef MD [facility]        Today's Diagnoses     Encounter for routine adult health examination without abnormal findings    -  1    Mild intermittent asthma without complication        Benign essential hypertension        Environmental allergies        Bee sting-induced anaphylaxis, accidental or unintentional, sequela        Lipoma of skin and subcutaneous tissue          Care Instructions      Preventive Health Recommendations  Female Ages 26 - 39  Yearly exam:   See your health care provider every year in order to    Review health changes.     Discuss preventive care.      Review your medicines if you your doctor has prescribed any.    Until age 30: Get a Pap test every three years (more often if you have had an abnormal result).    After age 30: Talk to your doctor about whether you should have a Pap test every 3 years or have a Pap test with HPV screening every 5 years.   You do not need a Pap test if your uterus was removed (hysterectomy) and you have not had cancer.  You should be tested each year for STDs (sexually transmitted diseases), if you're at risk.   Talk to your provider about how often to have your cholesterol checked.  If you are at risk for diabetes, you should have a diabetes test (fasting glucose).  Shots: Get a flu shot each year. Get a tetanus shot every 10 years.   Nutrition:     Eat at least 5 servings of fruits and vegetables each day.    Eat whole-grain bread, whole-wheat pasta and brown rice instead of white grains and rice.    Get adequate Calcium and Vitamin D.     Lifestyle    Exercise at least 150 minutes a week (30 minutes a day, 5 days of the week). This will help you control your weight and prevent disease.    Limit alcohol to one  drink per day.    No smoking.     Wear sunscreen to prevent skin cancer.    See your dentist every six months for an exam and cleaning.            Follow-ups after your visit        Additional Services     ASTHMA EDUCATION REFERRAL         Please be aware that coverage of these services is subject to the terms and limitations of your health insurance plan.  Call member services at your health plan with any benefit or coverage questions.      Please bring the following to your appointment:     >>   List of current medications  >>   This referral request   >>   Any documents/labs given to you for this referral  Your spacer device and/or peak meter if you have one            GENERAL SURG ADULT REFERRAL       Your provider has referred you to: CR: [facility] Delicia Sorto [phone]   [email or web address]    Please be aware that coverage of these services is subject to the terms and limitations of your health insurance plan.  Call member services at your health plan with any benefit or coverage questions.      Please bring the following with you to your appointment:    (1) Any X-Rays, CTs or MRIs which have been performed.  Contact the facility where they were done to arrange for  prior to your scheduled appointment.   (2) List of current medications   (3) This referral request   (4) Any documents/labs given to you for this referral                  Who to contact     If you have questions or need follow up information about today's clinic visit or your schedule please contact [facility] directly at [phone].  Normal or non-critical lab and imaging results will be communicated to you by MyChart, letter or phone within 4 business days after the clinic has received the results. If you do not hear from us within 7 days, please contact the clinic through MyChart or phone. If you have a critical or abnormal lab result, we will notify you by phone as soon  "as possible.  Submit refill requests through [employer] or call your pharmacy and they will forward the refill request to us. Please allow 3 business days for your refill to be completed.          Additional Information About Your Visit        [employer] Information     [employer] gives you secure access to your electronic health record. If you see a primary care provider, you can also send messages to your care team and make appointments. If you have questions, please call your primary care clinic.  If you do not have a primary care provider, please call [phone] and they will assist you.        Care EveryWhere ID     This is your Care EveryWhere ID. This could be used by other organizations to access your [place] medical records  [id number]        Your Vitals Were     Pulse Temperature Height Pulse Oximetry BMI (Body Mass Index)       106 98.2  F (36.8  C) (Oral) 5' 0.5\" (1.537 m) 98% 33.81 kg/m2        Blood Pressure from Last 3 Encounters:   11/14/18 (!) 149/92   10/17/18 130/80   07/05/18 136/88    Weight from Last 3 Encounters:   11/14/18 176 lb (79.8 kg)   10/17/18 179 lb (81.2 kg)   07/05/18 183 lb (83 kg)              We Performed the Following     ASTHMA EDUCATION REFERRAL     GENERAL SURG ADULT REFERRAL     Pap imaged thin layer screen with HPV - recommended age 30 - 65 years (select HPV order below)          Today's Medication Changes          These changes are accurate as of 11/14/18  2:20 PM.  If you have any questions, ask your nurse or doctor.               Start taking these medicines.        Dose/Directions    hydrochlorothiazide 12.5 MG Tabs tablet   Used for:  Benign essential hypertension   Started by:  Aurora Youssef MD        Dose:  12.5 mg   Take 1 tablet (12.5 mg) by mouth daily   Quantity:  30 tablet   Refills:  1            Where to get your medicines      These medications were sent to [id number] - [facility] [address] [place] [address] [address]VE [address] AT [facility] [address]  [address], [facility] [address]     Phone:  [phone]     albuterol 108 (90 Base) MCG/ACT inhaler    EPINEPHrine 0.3 MG/0.3ML injection 2-pack    fluticasone 50 MCG/ACT spray    hydrochlorothiazide 12.5 MG Tabs tablet                Primary Care Provider Office Phone # Fax #    Aurora Youssef [phone] [address] [facility] [address] [place] [address]        Equal Access to Services     RENE JAMES : Hadii aad ku hadasho Soomaali, waaxda luqadaha, qaybta kaalmada adeegyada, waxay idiin hayaan adeeg kharash murray . So [facility] [phone].    ATENCIÓN: Si habla español, tiene a balderas disposición servicios gratuitos de asistencia lingüística. [facility] [phone].    We comply with applicable federal civil rights laws and [place] laws. We do not discriminate on the basis of race, color, national origin, age, disability, sex, sexual orientation, or gender identity.            Thank you!     Thank you for choosing [facility]  for your care. Our goal is always to provide you with excellent care. Hearing back from our patients is one way we can continue to improve our services. Please take a few minutes to complete the written survey that you may receive in the mail after your visit with us. Thank you!             Your Updated Medication List - Protect others around you: Learn how to safely use, store and throw away your medicines at www.disposemymeds.org.          This list is accurate as of 11/14/18  2:20 PM.  Always use your most recent med list.                   Brand Name Dispense Instructions for use Diagnosis    * albuterol 108 (90 Base) MCG/ACT inhaler    PROAIR HFA/PROVENTIL HFA/VENTOLIN HFA    1 Inhaler    Inhale 2 puffs into the lungs every 6 hours as needed for shortness of breath / dyspnea or wheezing    Viral URI with cough       * albuterol 108 (90 Base) MCG/ACT inhaler    PROAIR HFA/PROVENTIL HFA/VENTOLIN HFA    1 Inhaler    Inhale 2 puffs into the lungs every 6  hours as needed for shortness of breath / dyspnea or wheezing    Mild intermittent asthma without complication       clindamycin 150 MG capsule    CLEOCIN    21 capsule    Take 1 capsule (150 mg) by mouth 3 times daily    Folliculitis       dexamethasone 4 MG/ML injection    DECADRON    30 mL    To be used topically by therapist during PT sessions.    Sprain of anterior talofibular ligament of right ankle, initial encounter       EPINEPHrine 0.3 MG/0.3ML injection 2-pack    EPIPEN/ADRENACLICK/or ANY BX GENERIC EQUIV    0.6 mL    Inject 0.3 mLs (0.3 mg) into the muscle once as needed for anaphylaxis    Bee sting-induced anaphylaxis, accidental or unintentional, sequela       fexofenadine-pseudoePHEDrine 180-240 MG per 24 hr tablet    ALLEGRA-D 24    14 tablet    Take 1 tablet by mouth daily    Viral URI with cough       fluticasone 50 MCG/ACT spray    FLONASE    1 Bottle    Spray 1-2 sprays into both nostrils daily    Environmental allergies       hydrochlorothiazide 12.5 MG Tabs tablet     30 tablet    Take 1 tablet (12.5 mg) by mouth daily    Benign essential hypertension       * medroxyPROGESTERone 150 MG/ML injection    DEPO-PROVERA    1 mL    Inject 1 mL (150 mg) into the muscle every 3 months    Depo-Provera contraceptive status, Encounter for initial prescription of injectable contraceptive       * medroxyPROGESTERone 150 MG/ML injection    DEPO-PROVERA    1 mL    Inject 1 mL (150 mg) into the muscle every 3 months    Encounter for initial prescription of injectable contraceptive       naproxen 500 MG tablet    NAPROSYN    60 tablet    Take 1 tablet (500 mg) by mouth 2 times daily (with meals)        order for DME     1 Device    Equipment being ordered: size 7 trilok brace    Sprain of anterior talofibular ligament of right ankle, initial encounter       order for DME     1 Device    Equipment being ordered:    Sprain of anterior talofibular ligament of right ankle, initial encounter       * Notice:  This  list has 4 medication(s) that are the same as other medications prescribed for you. Read the directions carefully, and ask your doctor or other care provider to review them with you.

## 2018-11-14 NOTE — LETTER
October 31, 2019      Michelle Charles Tres  [address] [facility] [address]    Dear ,      At [place], your health and wellness is our primary concern. That is why we are following up on a positive high risk HPV test from 11/14/18. Your provider had recommended that you have a Pap smear and HPV test completed by 11/14/19. Our records do not show that this has been scheduled.    It is important to complete the follow up that your provider has suggested for you to ensure that there are no worsening changes which may, over time, develop into cancer.      Please contact our office at  [phone] to schedule an appointment for a Pap smear and HPV test at your earliest convenience. If you have questions or concerns, please call the clinic and we will be happy to assist you.    If you have completed the tests outside of [place], please have the results forwarded to our office. We will update the chart for your primary Physician to review before your next annual physical.     Thank you for choosing [place]!    Sincerely,      Your [place] Care Team/[facility]

## 2018-11-14 NOTE — PROGRESS NOTES
SUBJECTIVE:   CC: Michelle Joshua is an 27 year old woman who presents for preventive health visit.     Physical   Annual:     Getting at least 3 servings of Calcium per day:  Yes    Bi-annual eye exam:  Yes    Dental care twice a year:  Yes    Sleep apnea or symptoms of sleep apnea:  None    Diet:  Other    Frequency of exercise:  1 day/week    Duration of exercise:  Less than 15 minutes    Taking medications regularly:  Not Applicable    Additional concerns today:  No        PROBLEMS TO ADD ON...  1) lipoma on her back , was removed a few yrs agp and now is coming back , on her back , deep   2) HTN, has had a couple of occasions when her BP has been high , today at the beginning of the visit high , she does not have any symptoms but she has a FH of HTN on he rmom's side     Today's PHQ-2 Score:   PHQ-2 ( 1999 [employer]) 11/14/2018   Q1: Little interest or pleasure in doing things 0   Q2: Feeling down, depressed or hopeless 0   PHQ-2 Score 0   Q1: Little interest or pleasure in doing things Not at all   Q2: Feeling down, depressed or hopeless Not at all   PHQ-2 Score 0       Abuse: Current or Past(Physical, Sexual or Emotional)- No  Do you feel safe in your environment - Yes    Social History   Substance Use Topics     Smoking status: Current Every Day Smoker     Packs/day: 0.50     Types: Cigarettes     Smokeless tobacco: Never Used      Comment: sometimes     Alcohol use No     Alcohol Use 11/14/2018   If you drink alcohol do you typically have greater than 3 drinks per day OR greater than 7 drinks per week? Not Applicable   No flowsheet data found.    Reviewed orders with patient.  Reviewed health maintenance and updated orders accordingly - Yes  Labs reviewed in EPIC    Mammogram not appropriate for this patient based on age.    Pertinent mammograms are reviewed under the imaging tab.  History of abnormal Pap smear: NO - age 21-29 PAP every 3 years recommended  PAP / HPV 9/8/2015 7/27/2012 7/26/2010   PAP NIL  "NIL NIL     Reviewed and updated as needed this visit by clinical staff  Tobacco  Allergies  Meds         Reviewed and updated as needed this visit by Provider        Past Medical History:   Diagnosis Date     Uncomplicated asthma      Unspecified otitis media     Otitis Media as a child      Past Surgical History:   Procedure Laterality Date     HC CREATE EARDRUM OPENING,GEN ANESTH      P.E. Tubes     HC REMOVE TONSILS/ADENOIDS,<11 Y/O      T & A <11y.o.       Review of Systems  CONSTITUTIONAL: NEGATIVE for fever, chills, change in weight  INTEGUMENTARU/SKIN: NEGATIVE for worrisome rashes, moles or lesions  EYES: NEGATIVE for vision changes or irritation  ENT: NEGATIVE for ear, mouth and throat problems  RESP: NEGATIVE for significant cough or SOB  BREAST: NEGATIVE for masses, tenderness or discharge  CV: NEGATIVE for chest pain, palpitations or peripheral edema  GI: NEGATIVE for nausea, abdominal pain, heartburn, or change in bowel habits  : NEGATIVE for unusual urinary or vaginal symptoms. Periods are regular.  MUSCULOSKELETAL: NEGATIVE for significant arthralgias or myalgia  NEURO: NEGATIVE for weakness, dizziness or paresthesias  PSYCHIATRIC: NEGATIVE for changes in mood or affect     OBJECTIVE:   BP (!) 149/92 (BP Location: Left arm, Cuff Size: Adult Large)  Pulse 106  Temp 98.2  F (36.8  C) (Oral)  Ht 5' 0.5\" (1.537 m)  Wt 176 lb (79.8 kg)  SpO2 98%  BMI 33.81 kg/m2  Physical Exam  GENERAL: healthy, alert and no distress  EYES: Eyes grossly normal to inspection, PERRL and conjunctivae and sclerae normal  HENT: ear canals and TM's normal, nose and mouth without ulcers or lesions  NECK: no adenopathy, no asymmetry, masses, or scars and thyroid normal to palpation  RESP: lungs clear to auscultation - no rales, rhonchi or wheezes  BREAST: normal without masses, tenderness or nipple discharge and no palpable axillary masses or adenopathy  CV: regular rate and rhythm, normal S1 S2, no S3 or S4, no " murmur, click or rub, no peripheral edema and peripheral pulses strong  ABDOMEN: soft, nontender, no hepatosplenomegaly, no masses and bowel sounds normal   (female): normal female external genitalia, normal urethral meatus, vaginal mucosa pink, moist, well rugated, and normal cervix/adnexa/uterus without masses or discharge  MS: no gross musculoskeletal defects noted, no edema  SKIN: no suspicious lesions or rashes EXCEPT a lipoma on the back about 2 cm in size soft non tender toward the low back to the left side   NEURO: Normal strength and tone, mentation intact and speech normal  PSYCH: mentation appears normal, affect normal/bright    Diagnostic Test Results:  Results for orders placed or performed in visit on 11/14/18   Pap imaged thin layer screen with HPV - recommended age 30 - 65 years (select HPV order below)   Result Value Ref Range    PAP NIL     Copath Report         Patient Name: CARLOS WEBSTER  MR#: [id number]  Specimen #: [id number]  Collected: 11/14/2018  Received: 11/16/2018  Reported: 11/19/2018 09:43  Ordering Phy(s): ENRIQUE FISHER    For improved result formatting, select 'View Enhanced Report Format' under   Linked Documents section.    SPECIMEN/STAIN PROCESS:  Pap imaged thin layer prep screening (Surepath, FocalPoint with guided   screening)       Pap-Cyto x 1, HPV ordered x 1    SOURCE: Cervical, endocervical  ----------------------------------------------------------------   Pap imaged thin layer prep screening (Surepath, FocalPoint with guided   screening)  SPECIMEN ADEQUACY:  Satisfactory for evaluation.  -Transformation zone component present.    CYTOLOGIC INTERPRETATION:    Negative for intraepithelial lesion or malignancy    Electronically signed out by:  LEO Saul  (ASCP)    Processed and screened at [facility],   [facility]    CLINICAL HISTORY:    Injection, A previous nor mal pap  Date of Last Pap: 9/8/15,    Papanicolaou Test  Limitations:  Cervical cytology is a screening test with   limited sensitivity; regular  screening is critical for cancer prevention; Pap tests are primarily   effective for the diagnosis/prevention of  squamous cell carcinoma, not adenocarcinomas or other cancers.    TESTING LAB LOCATION:  [address]  [phone]    COLLECTION SITE:  Client:  [facility]  Location: [facility] (B)     HPV High Risk Types DNA Cervical   Result Value Ref Range    HPV Source SurePath     HPV 16 DNA Negative NEG^Negative    HPV 18 DNA Negative NEG^Negative    Other HR HPV Positive (A) NEG^Negative    Final Diagnosis       This patient's sample is positive for other HR HPV DNA (types 31, 33, 35, 39, 45, 51, 52,   56, 58, 59, 66 or 68), not HPV 16 or HPV 18 DNA. This result requires clinical correlation   with concurrent cytology findings.      Specimen Description Cervical Cells        ASSESSMENT/PLAN:   1. Encounter for routine adult health examination without abnormal findings  Discussed diet,calcium,exercise.Went over self breast exam.Thin prep was done.Eyes and teeth UTD.No immunizations needed today.See orders below for tests ordered and screening needed.    - Pap imaged thin layer screen with HPV - recommended age 30 - 65 years (select HPV order below)  - HPV High Risk Types DNA Cervical    2. Mild intermittent asthma without complication  IS WELL CONTROLLED currently , warely needs to use the Albuterol   - ASTHMA EDUCATION REFERRAL  - albuterol (PROAIR HFA/PROVENTIL HFA/VENTOLIN HFA) 108 (90 Base) MCG/ACT inhaler; Inhale 2 puffs into the lungs every 6 hours as needed for shortness of breath / dyspnea or wheezing  Dispense: 1 Inhaler; Refill: 0    3. Benign essential hypertension  DISCUSSED starting hydrochlorothiazide , initially BP was elevated and she has had a few times when checked at other office visits elevated , so she will  "start the hydrochlorothiazide at 12.5mg and have her come back for recheck on the BP IN TWO WEEKS.  - hydrochlorothiazide 12.5 MG TABS tablet; Take 1 tablet (12.5 mg) by mouth daily  Dispense: 30 tablet; Refill: 1    4. Environmental allergies  Refilled  Doing well on this   - fluticasone (FLONASE) 50 MCG/ACT spray; Spray 1-2 sprays into both nostrils daily  Dispense: 1 Bottle; Refill: 0    5. Bee sting-induced anaphylaxis, accidental or unintentional, sequela  Refilled   - EPINEPHrine (EPIPEN/ADRENACLICK/OR ANY BX GENERIC EQUIV) 0.3 MG/0.3ML injection 2-pack; Inject 0.3 mLs (0.3 mg) into the muscle once as needed for anaphylaxis  Dispense: 0.6 mL; Refill: 0    6. Lipoma of skin and subcutaneous tissue  Since she has had this removed and now re occurring , will refer to surgery for the removal   - GENERAL SURG ADULT REFERRAL    COUNSELING:  Reviewed preventive health counseling, as reflected in patient instructions       Regular exercise       Healthy diet/nutrition       Vision screening       Contraception       Family planning    BP Readings from Last 1 Encounters:   11/14/18 (!) 149/92     Estimated body mass index is 33.81 kg/(m^2) as calculated from the following:    Height as of this encounter: 5' 0.5\" (1.537 m).    Weight as of this encounter: 176 lb (79.8 kg).           reports that she has been smoking Cigarettes.  She has been smoking about 0.50 packs per day. She has never used smokeless tobacco.      Counseling Resources:  ATP IV Guidelines  Pooled Cohorts Equation Calculator  Breast Cancer Risk Calculator  FRAX Risk Assessment  ICSI Preventive Guidelines  Dietary Guidelines for Americans, 2010  [employer] MyPlate  ASA Prophylaxis  Lung CA Screening    Aurora Youssef MD  [facility]  Answers for HPI/ROS submitted by the patient on 11/14/2018   PHQ-2 Score: 0    "

## 2018-11-15 ASSESSMENT — ASTHMA QUESTIONNAIRES: ACT_TOTALSCORE: 20

## 2018-11-15 NOTE — TELEPHONE ENCOUNTER
"Sent MyChart to pt  Claritin not on med list  Rayne WALTERS on med list  Yolanda SANDOVAL RN    Requested Prescriptions   Pending Prescriptions Disp Refills     loratadine (CLARITIN) 10 MG tablet [Pharmacy Med Name: LORATADINE 10MG TABLETS] 30 tablet 0     Sig: TAKE 1 TABLET(10 MG) BY MOUTH DAILY    Antihistamines Protocol Passed    11/14/2018  7:54 PM       Passed - Patient is 3-64 years of age    Apply weight-based dosing for peds patients age 3 - 12 years of age.    Forward request to provider for patients under the age of 3 or over the age of 64.         Passed - Recent (12 mo) or future (30 days) visit within the authorizing provider's specialty    Patient had office visit in the last 12 months or has a visit in the next 30 days with authorizing provider or within the authorizing provider's specialty.  See \"Patient Info\" tab in inbasket, or \"Choose Columns\" in Meds & Orders section of the refill encounter.                  "

## 2018-11-19 LAB
COPATH REPORT: NORMAL
PAP: NORMAL

## 2018-11-20 LAB
FINAL DIAGNOSIS: ABNORMAL
HPV HR 12 DNA CVX QL NAA+PROBE: POSITIVE
HPV16 DNA SPEC QL NAA+PROBE: NEGATIVE
HPV18 DNA SPEC QL NAA+PROBE: NEGATIVE
SPECIMEN DESCRIPTION: ABNORMAL
SPECIMEN SOURCE CVX/VAG CYTO: ABNORMAL

## 2018-11-21 PROBLEM — R87.810 CERVICAL HIGH RISK HPV (HUMAN PAPILLOMAVIRUS) TEST POSITIVE: Status: ACTIVE | Noted: 2018-11-14

## 2018-11-21 RX ORDER — LORATADINE 10 MG/1
TABLET ORAL
Qty: 30 TABLET | Refills: 0 | Status: SHIPPED | OUTPATIENT
Start: 2018-11-21 | End: 2020-10-15

## 2018-11-21 NOTE — TELEPHONE ENCOUNTER
Pt has not read [employer]  Left message for patient to callback.  Yolanda SANDOVAL RN

## 2018-11-21 NOTE — TELEPHONE ENCOUNTER
Patient calling back, said she takes Loratadine and is also needing  A RX for her Epipen, Please call her back    Thank you

## 2018-11-21 NOTE — TELEPHONE ENCOUNTER
See below messages  Pt called back and confirmed she takes Claritin  Please advise on refill thanks  EpiPen sent 11/14/2018  Yolanda SANDOVAL RN

## 2018-11-28 ENCOUNTER — TELEPHONE (OUTPATIENT)
Dept: FAMILY MEDICINE | Facility: CLINIC | Age: 28
End: 2018-11-28

## 2018-11-28 NOTE — PROGRESS NOTES
11/14/18 NIL pap, + HR HPV (not 16/18) at age 27 yrs.  Plan: cotest in 1 year per provider. Pt was advised.  10/31/19 Cotest reminder letter sent. ([facility])  11/29/19 [facility] clinic and schedule. ([facility])  12/26/19 Patient is lost to pap tracking follow-up. FYI routed to provider. ([facility])

## 2018-12-03 VITALS
SYSTOLIC BLOOD PRESSURE: 138 MMHG | TEMPERATURE: 98.2 F | HEART RATE: 106 BPM | WEIGHT: 176 LBS | DIASTOLIC BLOOD PRESSURE: 85 MMHG | HEIGHT: 61 IN | OXYGEN SATURATION: 98 % | BODY MASS INDEX: 33.23 KG/M2

## 2018-12-10 DIAGNOSIS — Z91.09 ENVIRONMENTAL ALLERGIES: ICD-10-CM

## 2018-12-11 RX ORDER — FLUTICASONE PROPIONATE 50 MCG
SPRAY, SUSPENSION (ML) NASAL
Qty: 48 ML | Refills: 2 | Status: SHIPPED | OUTPATIENT
Start: 2018-12-11 | End: 2020-10-15

## 2018-12-11 NOTE — TELEPHONE ENCOUNTER
"Prescription approved per [facility] Refill Protocol.  Yolanda SANDOVAL RN    Requested Prescriptions   Pending Prescriptions Disp Refills     fluticasone (FLONASE) 50 MCG/ACT nasal spray [Pharmacy Med Name: FLUTICASONE 50MCG NASAL SP (120) RX] 16 mL 0     Sig: SHAKE LIQUID AND USE 1 TO 2 SPRAYS IN EACH NOSTRIL DAILY    Inhaled Steroids Protocol Passed - 12/10/2018  2:33 PM       Passed - Patient is age 12 or older       Passed - Asthma control assessment score within normal limits in last 6 months    Please review ACT score.          Passed - Recent (6 mo) or future (30 days) visit within the authorizing provider's specialty    Patient had office visit in the last 6 months or has a visit in the next 30 days with authorizing provider or within the authorizing provider's specialty.  See \"Patient Info\" tab in inbasket, or \"Choose Columns\" in Meds & Orders section of the refill encounter.                "

## 2019-04-12 ENCOUNTER — OFFICE VISIT (OUTPATIENT)
Dept: FAMILY MEDICINE | Facility: CLINIC | Age: 29
End: 2019-04-12
Payer: COMMERCIAL

## 2019-04-12 ENCOUNTER — TELEPHONE (OUTPATIENT)
Dept: FAMILY MEDICINE | Facility: CLINIC | Age: 29
End: 2019-04-12

## 2019-04-12 VITALS
SYSTOLIC BLOOD PRESSURE: 138 MMHG | OXYGEN SATURATION: 98 % | BODY MASS INDEX: 33.81 KG/M2 | RESPIRATION RATE: 14 BRPM | WEIGHT: 176 LBS | HEART RATE: 97 BPM | TEMPERATURE: 98.5 F | DIASTOLIC BLOOD PRESSURE: 88 MMHG

## 2019-04-12 DIAGNOSIS — J40 BRONCHITIS: Primary | ICD-10-CM

## 2019-04-12 DIAGNOSIS — J06.9 VIRAL URI WITH COUGH: ICD-10-CM

## 2019-04-12 DIAGNOSIS — J45.20 MILD INTERMITTENT ASTHMA WITHOUT COMPLICATION: ICD-10-CM

## 2019-04-12 PROCEDURE — 99214 OFFICE O/P EST MOD 30 MIN: CPT | Performed by: FAMILY MEDICINE

## 2019-04-12 RX ORDER — ALBUTEROL SULFATE 90 UG/1
2 AEROSOL, METERED RESPIRATORY (INHALATION) EVERY 6 HOURS PRN
Qty: 100 G | Refills: 1 | Status: SHIPPED | OUTPATIENT
Start: 2019-04-12 | End: 2020-10-15

## 2019-04-12 RX ORDER — AZITHROMYCIN 250 MG/1
TABLET, FILM COATED ORAL
Qty: 6 TABLET | Refills: 0 | Status: SHIPPED | OUTPATIENT
Start: 2019-04-12 | End: 2019-04-17

## 2019-04-12 NOTE — PROGRESS NOTES
SUBJECTIVE:   Michelle Joshua is a 28 year old female who presents to clinic today for the following   health issues:      RESPIRATORY SYMPTOMS- she has been having some cough for over a week but now with sputum, she does have asthma and currently does not feel SOB or wheezy but usually after URI , she would start wheezing , she is out of the Albuterol inhaler ,no Fever , no chills . Her son had an URI with high fevers and now bronchitis.      Duration: 2 days    Description  rhinorrhea, sore throat, cough and headache    Severity: moderate    Accompanying signs and symptoms: None    History (predisposing factors):  none    Precipitating or alleviating factors: None    Therapies tried and outcome:  none          Additional history: as documented    Reviewed  and updated as needed this visit by clinical staff  Allergies         Reviewed and updated as needed this visit by Provider         Patient Active Problem List   Diagnosis     Allergic rhinitis     Other acne     CARDIOVASCULAR SCREENING; LDL GOAL LESS THAN 160     Nicotine addiction     Bee sting-induced anaphylaxis     Bunion of left foot     Mild intermittent asthma     Obesity     Hip pain, chronic     Myalgia and myositis     Supervision of other normal pregnancy, antepartum     Need for Tdap vaccination     Encounter for triage in pregnant patient     Group B Streptococcus carrier, +RV culture, currently pregnant     Pregnancy      (spontaneous vaginal delivery)     Contraception     Shoulder pain     Pain in joint involving ankle and foot, right     Cervical high risk HPV (human papillomavirus) test positive     Past Surgical History:   Procedure Laterality Date     HC CREATE EARDRUM OPENING,GEN ANESTH      P.E. Tubes     HC REMOVE TONSILS/ADENOIDS,<12 Y/O      T & A <11y.o.       Social History     Tobacco Use     Smoking status: Current Every Day Smoker     Packs/day: 0.50     Types: Cigarettes     Smokeless tobacco: Never Used     Tobacco  comment: sometimes   Substance Use Topics     Alcohol use: No     Alcohol/week: 0.0 oz     Family History   Problem Relation Age of Onset     Hypertension Mother          Current Outpatient Medications   Medication Sig Dispense Refill     albuterol (PROAIR HFA/PROVENTIL HFA/VENTOLIN HFA) 108 (90 Base) MCG/ACT inhaler Inhale 2 puffs into the lungs every 6 hours as needed for shortness of breath / dyspnea or wheezing 100 g 1     albuterol (PROAIR HFA/PROVENTIL HFA/VENTOLIN HFA) 108 (90 Base) MCG/ACT inhaler Inhale 2 puffs into the lungs every 6 hours as needed for shortness of breath / dyspnea or wheezing 1 Inhaler 0     azithromycin (ZITHROMAX) 250 MG tablet Take 2 tablets (500 mg) by mouth daily for 1 day, THEN 1 tablet (250 mg) daily for 4 days. 6 tablet 0     clindamycin (CLEOCIN) 150 MG capsule Take 1 capsule (150 mg) by mouth 3 times daily 21 capsule 0     dexamethasone (DECADRON) 4 MG/ML injection To be used topically by therapist during PT sessions. 30 mL 0     EPINEPHrine (EPIPEN/ADRENACLICK/OR ANY BX GENERIC EQUIV) 0.3 MG/0.3ML injection 2-pack Inject 0.3 mLs (0.3 mg) into the muscle once as needed for anaphylaxis 0.6 mL 0     fexofenadine-pseudoePHEDrine (ALLEGRA-D 24) 180-240 MG per 24 hr tablet Take 1 tablet by mouth daily 14 tablet 0     fluticasone (FLONASE) 50 MCG/ACT nasal spray SHAKE LIQUID AND USE 1 TO 2 SPRAYS IN EACH NOSTRIL DAILY 48 mL 2     hydrochlorothiazide 12.5 MG TABS tablet Take 1 tablet (12.5 mg) by mouth daily 30 tablet 1     loratadine (CLARITIN) 10 MG tablet TAKE 1 TABLET(10 MG) BY MOUTH DAILY 30 tablet 0     medroxyPROGESTERone (DEPO-PROVERA) 150 MG/ML injection Inject 1 mL (150 mg) into the muscle every 3 months 1 mL 1     medroxyPROGESTERone (DEPO-PROVERA) 150 MG/ML injection Inject 1 mL (150 mg) into the muscle every 3 months 1 mL 1     naproxen (NAPROSYN) 500 MG tablet Take 1 tablet (500 mg) by mouth 2 times daily (with meals) 60 tablet 0     order for DME Equipment being ordered:  1 Device 0     order for DME Equipment being ordered: size 7 trilok brace 1 Device 0     Allergies   Allergen Reactions     Amoxicillin Hives     Bees      Penicillins      Sulfa Drugs      Recent Labs   Lab Test 01/31/17 2042 01/31/17 2031 11/15/16  1123  11/10/14  1045   ALT  --  58* 20  --   --    CR  --  0.66 0.72  --   --    GFRESTIMATED >90 >90  Non  GFR Calc   >90  Non  GFR Calc     < >  --    GFRESTBLACK >90 >90   GFR Calc   >90   GFR Calc     < >  --    POTASSIUM  --  3.6 4.1  --   --    TSH  --   --   --   --  0.76    < > = values in this interval not displayed.      BP Readings from Last 3 Encounters:   04/12/19 (!) 145/97   12/03/18 138/85   10/17/18 130/80    Wt Readings from Last 3 Encounters:   04/12/19 79.8 kg (176 lb)   11/14/18 79.8 kg (176 lb)   10/17/18 81.2 kg (179 lb)                  Labs reviewed in EPIC    ROS:  Constitutional, HEENT, cardiovascular, pulmonary, GI, , musculoskeletal, neuro, skin, endocrine and psych systems are negative, except as otherwise noted.    OBJECTIVE:     BP (!) 145/97 (BP Location: Right arm, Patient Position: Sitting, Cuff Size: Adult Large)   Pulse 97   Temp 98.5  F (36.9  C) (Oral)   Resp 14   Wt 79.8 kg (176 lb)   SpO2 98%   BMI 33.81 kg/m    Body mass index is 33.81 kg/m .  GENERAL: healthy, alert and no distress  EYES: Eyes grossly normal to inspection, PERRL and conjunctivae and sclerae normal  NECK: no adenopathy, no asymmetry, masses, or scars and thyroid normal to palpation  RESP: lungs clear to auscultation - no rales, rhonchi or wheezes  CV: regular rate and rhythm, normal S1 S2, no S3 or S4, no murmur, click or rub, no peripheral edema and peripheral pulses strong  ABDOMEN: soft, nontender, no hepatosplenomegaly, no masses and bowel sounds normal  MS: no gross musculoskeletal defects noted, no edema    Diagnostic Test Results:  none     ASSESSMENT/PLAN:             1. Mild  intermittent asthma without complication  Refilled her Albuterol inhaler to use as needed q 4 hrs as she is out of inhalers .    3. Bronchitis  We discussed fluids, rest, cough syrup but in case she does not feel any better in 2 to 3 days should start the Abx as she usually has asthma exacerbations if not .  - albuterol (PROAIR HFA/PROVENTIL HFA/VENTOLIN HFA) 108 (90 Base) MCG/ACT inhaler; Inhale 2 puffs into the lungs every 6 hours as needed for shortness of breath / dyspnea or wheezing  Dispense: 100 g; Refill: 1  - azithromycin (ZITHROMAX) 250 MG tablet; Take 2 tablets (500 mg) by mouth daily for 1 day, THEN 1 tablet (250 mg) daily for 4 days.  Dispense: 6 tablet; Refill: 0    RTC if no improving or worsening.  Pt is aware  and comfortable with the current plan.      Aurora Youssef MD  [facility]

## 2019-04-12 NOTE — TELEPHONE ENCOUNTER
Reason for Call:  Medication or medication refill:    Do you use a [facility]?  Name of the pharmacy and phone number for the current request:     [id number] - [place], [place] - [facility] [address]    Name of the medication requested: albuterol (PROAIR HFA/PROVENTIL HFA/VENTOLIN HFA) 108 (90 Base) MCG/ACT inhaler    Other request: Pharmacy to confirm # of inhalers needed.  Please call pharmacy back with clarification    Can we leave a detailed message on this number? YES    Phone number patient can be reached at: Home number on file [phone] (home)    Best Time: any    Call taken on 4/12/2019 at 4:47 PM by Mirella Mccain

## 2019-10-01 PROBLEM — Z78.9 USES BIRTH CONTROL: Status: ACTIVE | Noted: 2017-02-14

## 2019-11-29 ENCOUNTER — TELEPHONE (OUTPATIENT)
Dept: FAMILY MEDICINE | Facility: CLINIC | Age: 29
End: 2019-11-29

## 2019-11-29 DIAGNOSIS — R87.810 CERVICAL HIGH RISK HPV (HUMAN PAPILLOMAVIRUS) TEST POSITIVE: ICD-10-CM

## 2019-11-29 NOTE — TELEPHONE ENCOUNTER
Pt is past due for f/u pap smear.  [facility] clinic and schedule.    Shilpa uPtnam  Pap Tracking

## 2019-12-08 ENCOUNTER — HEALTH MAINTENANCE LETTER (OUTPATIENT)
Age: 29
End: 2019-12-08

## 2020-03-15 ENCOUNTER — HEALTH MAINTENANCE LETTER (OUTPATIENT)
Age: 30
End: 2020-03-15

## 2020-03-26 ENCOUNTER — OFFICE VISIT (OUTPATIENT)
Dept: URGENT CARE | Facility: URGENT CARE | Age: 30
End: 2020-03-26
Payer: COMMERCIAL

## 2020-03-26 ENCOUNTER — NURSE TRIAGE (OUTPATIENT)
Dept: NURSING | Facility: CLINIC | Age: 30
End: 2020-03-26

## 2020-03-26 ENCOUNTER — ANCILLARY PROCEDURE (OUTPATIENT)
Dept: GENERAL RADIOLOGY | Facility: CLINIC | Age: 30
End: 2020-03-26
Attending: FAMILY MEDICINE
Payer: COMMERCIAL

## 2020-03-26 VITALS
OXYGEN SATURATION: 98 % | SYSTOLIC BLOOD PRESSURE: 151 MMHG | DIASTOLIC BLOOD PRESSURE: 95 MMHG | WEIGHT: 150 LBS | BODY MASS INDEX: 27.6 KG/M2 | HEIGHT: 62 IN | TEMPERATURE: 99.4 F | HEART RATE: 107 BPM

## 2020-03-26 DIAGNOSIS — S99.922A INJURY OF LEFT FOOT, INITIAL ENCOUNTER: ICD-10-CM

## 2020-03-26 DIAGNOSIS — S99.922A INJURY OF LEFT FOOT, INITIAL ENCOUNTER: Primary | ICD-10-CM

## 2020-03-26 PROCEDURE — 99214 OFFICE O/P EST MOD 30 MIN: CPT | Performed by: FAMILY MEDICINE

## 2020-03-26 PROCEDURE — 73630 X-RAY EXAM OF FOOT: CPT | Mod: LT

## 2020-03-26 PROCEDURE — 73610 X-RAY EXAM OF ANKLE: CPT | Mod: LT

## 2020-03-26 ASSESSMENT — MIFFLIN-ST. JEOR: SCORE: 1345.71
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
## 2020-03-27 NOTE — PATIENT INSTRUCTIONS
Tylenol and Ibuprofen every 4-6 hours as needed for discomfort    Ice as needed intermittently 10 minutes at a time every 1-2 hours for discomfort    Continue to use the boot as do not bear weight until the pain dramatically improves  Use the crutches to get around    Elevate the leg when at rest/sleeping to help with the swelling    Follow-up appointment with your doctor's office in 5 days as you are in a lot of pain today, they may consider repeating xrays If the pain hasn't come down    We will call you if the radiologist notices any abnormalities not discussed today.     If symptoms worsen please call clinic to discuss.
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
## 2020-06-24 NOTE — NURSING NOTE
"Chief Complaint   Patient presents with     Imm/Inj     depo     There were no vitals taken for this visit. Estimated body mass index is 36.73 kg/(m^2) as calculated from the following:    Height as of 2/2/17: 5' 2\" (1.575 m).    Weight as of 2/2/17: 200 lb 12.8 oz (91.1 kg).  BP completed using cuff size: NA (Not Taken)             Macy Woods CMA  " electrolytes

## 2020-10-15 ENCOUNTER — VIRTUAL VISIT (OUTPATIENT)
Dept: FAMILY MEDICINE | Facility: CLINIC | Age: 30
End: 2020-10-15
Payer: COMMERCIAL

## 2020-10-15 DIAGNOSIS — T78.2XXS BEE STING-INDUCED ANAPHYLAXIS, ACCIDENTAL OR UNINTENTIONAL, SEQUELA: ICD-10-CM

## 2020-10-15 DIAGNOSIS — K04.7 TOOTH ABSCESS: ICD-10-CM

## 2020-10-15 DIAGNOSIS — J45.20 MILD INTERMITTENT ASTHMA WITHOUT COMPLICATION: ICD-10-CM

## 2020-10-15 DIAGNOSIS — Z91.09 ENVIRONMENTAL ALLERGIES: ICD-10-CM

## 2020-10-15 DIAGNOSIS — T63.441S BEE STING-INDUCED ANAPHYLAXIS, ACCIDENTAL OR UNINTENTIONAL, SEQUELA: ICD-10-CM

## 2020-10-15 PROCEDURE — 99214 OFFICE O/P EST MOD 30 MIN: CPT | Mod: 95 | Performed by: PHYSICIAN ASSISTANT

## 2020-10-15 RX ORDER — EPINEPHRINE 0.3 MG/.3ML
0.3 INJECTION SUBCUTANEOUS
Qty: 0.6 ML | Refills: 0 | Status: SHIPPED | OUTPATIENT
Start: 2020-10-15 | End: 2021-07-27

## 2020-10-15 RX ORDER — IBUPROFEN 800 MG/1
800 TABLET, FILM COATED ORAL EVERY 8 HOURS PRN
Qty: 30 TABLET | Refills: 0 | Status: SHIPPED | OUTPATIENT
Start: 2020-10-15 | End: 2021-09-07

## 2020-10-15 RX ORDER — LORATADINE 10 MG/1
10 TABLET ORAL DAILY
Qty: 90 TABLET | Refills: 3 | Status: SHIPPED | OUTPATIENT
Start: 2020-10-15 | End: 2021-07-27

## 2020-10-15 RX ORDER — ALBUTEROL SULFATE 90 UG/1
2 AEROSOL, METERED RESPIRATORY (INHALATION) EVERY 6 HOURS PRN
Qty: 1 INHALER | Refills: 0 | Status: SHIPPED | OUTPATIENT
Start: 2020-10-15 | End: 2020-12-18

## 2020-10-15 RX ORDER — CLINDAMYCIN HCL 150 MG
150 CAPSULE ORAL 3 TIMES DAILY
Qty: 21 CAPSULE | Refills: 0 | Status: SHIPPED | OUTPATIENT
Start: 2020-10-15 | End: 2021-09-07
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
## 2020-10-16 ASSESSMENT — ASTHMA QUESTIONNAIRES: ACT_TOTALSCORE: 14

## 2020-12-12 ENCOUNTER — TRANSFERRED RECORDS (OUTPATIENT)
Dept: HEALTH INFORMATION MANAGEMENT | Facility: CLINIC | Age: 30
End: 2020-12-12

## 2020-12-12 LAB
ALT SERPL-CCNC: 20 IU/L
INR PPP: 0.9 (ref 0.8–1.1)

## 2020-12-13 LAB
CREAT SERPL-MCNC: 0.49 MG/DL (ref 0.5–1)
GFR SERPL CREATININE-BSD FRML MDRD: >120 ML/MIN/1.73M2
GLUCOSE SERPL-MCNC: 104 MG/DL (ref 70–100)
POTASSIUM SERPL-SCNC: 3.6 MEQ/L (ref 3.5–5.3)

## 2020-12-16 ENCOUNTER — TELEPHONE (OUTPATIENT)
Dept: FAMILY MEDICINE | Facility: CLINIC | Age: 30
End: 2020-12-16
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
## 2020-12-16 NOTE — TELEPHONE ENCOUNTER
LS,    Patient showed up late for her appointment with you today.  Note:  Patient has consistent pattern of showing up late    Ok to schedule her for video visit with you on Friday 12/18 with reminder to be on time?    Kiah Weber RN

## 2020-12-18 ENCOUNTER — OFFICE VISIT (OUTPATIENT)
Dept: FAMILY MEDICINE | Facility: CLINIC | Age: 30
End: 2020-12-18
Payer: COMMERCIAL

## 2020-12-18 VITALS
HEART RATE: 96 BPM | OXYGEN SATURATION: 100 % | SYSTOLIC BLOOD PRESSURE: 138 MMHG | DIASTOLIC BLOOD PRESSURE: 80 MMHG | TEMPERATURE: 97.3 F | BODY MASS INDEX: 27.6 KG/M2 | HEIGHT: 62 IN | RESPIRATION RATE: 20 BRPM | WEIGHT: 150 LBS

## 2020-12-18 DIAGNOSIS — F41.9 ANXIETY: ICD-10-CM

## 2020-12-18 DIAGNOSIS — J45.20 MILD INTERMITTENT ASTHMA WITHOUT COMPLICATION: ICD-10-CM

## 2020-12-18 DIAGNOSIS — J30.1 ALLERGIC RHINITIS DUE TO POLLEN, UNSPECIFIED SEASONALITY: Primary | ICD-10-CM

## 2020-12-18 PROCEDURE — 99214 OFFICE O/P EST MOD 30 MIN: CPT | Performed by: FAMILY MEDICINE

## 2020-12-18 RX ORDER — ALBUTEROL SULFATE 90 UG/1
2 AEROSOL, METERED RESPIRATORY (INHALATION) EVERY 6 HOURS PRN
Qty: 1 INHALER | Refills: 4 | Status: SHIPPED | OUTPATIENT
Start: 2020-12-18 | End: 2021-02-17

## 2020-12-18 RX ORDER — ESCITALOPRAM OXALATE 10 MG/1
10 TABLET ORAL DAILY
Qty: 30 TABLET | Refills: 1 | Status: SHIPPED | OUTPATIENT
Start: 2020-12-18 | End: 2021-02-17

## 2020-12-18 ASSESSMENT — PATIENT HEALTH QUESTIONNAIRE - PHQ9
5. POOR APPETITE OR OVEREATING: MORE THAN HALF THE DAYS
SUM OF ALL RESPONSES TO PHQ QUESTIONS 1-9: 9

## 2020-12-18 ASSESSMENT — ANXIETY QUESTIONNAIRES
2. NOT BEING ABLE TO STOP OR CONTROL WORRYING: MORE THAN HALF THE DAYS
GAD7 TOTAL SCORE: 10
7. FEELING AFRAID AS IF SOMETHING AWFUL MIGHT HAPPEN: NOT AT ALL
IF YOU CHECKED OFF ANY PROBLEMS ON THIS QUESTIONNAIRE, HOW DIFFICULT HAVE THESE PROBLEMS MADE IT FOR YOU TO DO YOUR WORK, TAKE CARE OF THINGS AT HOME, OR GET ALONG WITH OTHER PEOPLE: VERY DIFFICULT
6. BECOMING EASILY ANNOYED OR IRRITABLE: MORE THAN HALF THE DAYS
3. WORRYING TOO MUCH ABOUT DIFFERENT THINGS: MORE THAN HALF THE DAYS
5. BEING SO RESTLESS THAT IT IS HARD TO SIT STILL: SEVERAL DAYS
1. FEELING NERVOUS, ANXIOUS, OR ON EDGE: SEVERAL DAYS

## 2020-12-18 ASSESSMENT — MIFFLIN-ST. JEOR: SCORE: 1340.71

## 2020-12-18 NOTE — PROGRESS NOTES
Subjective     Michelle Joshua is a 30 year old female who presents to clinic today for the following health issues:    HPI         Depression and Anxiety Follow-Up- she has had some underline anxiety and she has been able to cope but recently seems that it is getting out of control, she has episodes of racing heart , feeling very anxious , she has not been on antidepressants but at this point with the stress around her also having to raise two young boys and work etc , she thinks she would need something to be able to cope and get her through these times     How are you doing with your depression since your last visit? Worsened     How are you doing with your anxiety since your last visit?  Worsened     Are you having other symptoms that might be associated with depression or anxiety? No    Have you had a significant life event? OTHER: no     Do you have any concerns with your use of alcohol or other drugs? No  2) allergic rhinitis , she has had more symptoms lately and seems worse at night she is not sure if she has allergies to dust mites or mold or what else she might be allergic to   Social History     Tobacco Use     Smoking status: Current Every Day Smoker     Packs/day: 0.50     Types: Cigarettes     Smokeless tobacco: Never Used     Tobacco comment: sometimes   Substance Use Topics     Alcohol use: No     Alcohol/week: 0.0 standard drinks     Drug use: No     PHQ 9/16/2016 8/28/2017 6/12/2018   PHQ-9 Total Score 6 0 0   Q9: Thoughts of better off dead/self-harm past 2 weeks Not at all Not at all Not at all     ZACHARIAH-7 SCORE 6/12/2018   Total Score 1     Last PHQ-9 12/18/2020   1.  Little interest or pleasure in doing things 1   2.  Feeling down, depressed, or hopeless 1   3.  Trouble falling or staying asleep, or sleeping too much 2   4.  Feeling tired or having little energy 1   5.  Poor appetite or overeating 1   6.  Feeling bad about yourself 1   7.  Trouble concentrating 1   8.  Moving slowly or  restless 1   Q9: Thoughts of better off dead/self-harm past 2 weeks 0   PHQ-9 Total Score 9   Difficulty at work, home, or with people Somewhat difficult       Suicide Assessment Five-step Evaluation and Treatment (SAFE-T)      Review of Systems   Constitutional, HEENT, cardiovascular, pulmonary, GI, , musculoskeletal, neuro, skin, endocrine and psych systems are negative, except as otherwise noted.      Objective    There were no vitals taken for this visit.  There is no height or weight on file to calculate BMI.  Physical Exam   GENERAL: healthy, alert and no distress  EYES: Eyes grossly normal to inspection, PERRL and conjunctivae and sclerae normal  NECK: no adenopathy, no asymmetry, masses, or scars and thyroid normal to palpation  RESP: lungs clear to auscultation - no rales, rhonchi or wheezes  CV: regular rate and rhythm, normal S1 S2, no S3 or S4, no murmur, click or rub, no peripheral edema and peripheral pulses strong  ABDOMEN: soft, nontender, no hepatosplenomegaly, no masses and bowel sounds normal  MS: no gross musculoskeletal defects noted, no edema  NEURO: Normal strength and tone, mentation intact and speech normal    No results found for this or any previous visit (from the past 24 hour(s)).        Assessment & Plan     Allergic rhinitis due to pollen, unspecified seasonality  We discussed referral for an allergist so she can get tested and see which her triggers are   - ALLERGY/ASTHMA ADULT REFERRAL    Anxiety  Discussed starting Lexapro , she has not been on an antidepressant but now her anxiety is interfering with her ADL . She will start with 5 mg for a few days and then take on etablet 10 mg daily for four weeks then follow up with me - she denies any suicidal thoughts or ideas   - escitalopram (LEXAPRO) 10 MG tablet; Take 1 tablet (10 mg) by mouth daily Start with half a tablet daily for five days then one tablet daily    Mild intermittent asthma without complication  Refilled her inhaler  "  - albuterol (PROAIR HFA/PROVENTIL HFA/VENTOLIN HFA) 108 (90 Base) MCG/ACT inhaler; Inhale 2 puffs into the lungs every 6 hours as needed for shortness of breath / dyspnea or wheezing     Tobacco Cessation:   reports that she has been smoking cigarettes. She has been smoking about 0.50 packs per day. She has never used smokeless tobacco.  Tobacco Cessation Action Plan: Information offered: Patient not interested at this time      BMI:   Estimated body mass index is 27.88 kg/m  as calculated from the following:    Height as of this encounter: 1.562 m (5' 1.5\").    Weight as of this encounter: 68 kg (150 lb).            RTC if no improving or worsening.  Pt is aware  and comfortable with the current plan.      Aurora Youssef MD  [facility]    "

## 2020-12-19 ASSESSMENT — ANXIETY QUESTIONNAIRES: GAD7 TOTAL SCORE: 10

## 2021-01-10 ENCOUNTER — HEALTH MAINTENANCE LETTER (OUTPATIENT)
Age: 31
End: 2021-01-10

## 2021-02-17 ENCOUNTER — OFFICE VISIT (OUTPATIENT)
Dept: FAMILY MEDICINE | Facility: CLINIC | Age: 31
End: 2021-02-17
Payer: COMMERCIAL

## 2021-02-17 DIAGNOSIS — F32.1 CURRENT MODERATE EPISODE OF MAJOR DEPRESSIVE DISORDER WITHOUT PRIOR EPISODE (H): ICD-10-CM

## 2021-02-17 DIAGNOSIS — J45.40 MODERATE PERSISTENT ASTHMA WITHOUT COMPLICATION: ICD-10-CM

## 2021-02-17 DIAGNOSIS — F41.9 ANXIETY: Primary | ICD-10-CM

## 2021-02-17 DIAGNOSIS — J45.20 MILD INTERMITTENT ASTHMA WITHOUT COMPLICATION: ICD-10-CM

## 2021-02-17 PROCEDURE — 99214 OFFICE O/P EST MOD 30 MIN: CPT | Performed by: FAMILY MEDICINE

## 2021-02-17 RX ORDER — ALBUTEROL SULFATE 90 UG/1
2 AEROSOL, METERED RESPIRATORY (INHALATION) EVERY 6 HOURS PRN
Qty: 1 INHALER | Refills: 4 | Status: SHIPPED | OUTPATIENT
Start: 2021-02-17 | End: 2021-07-27

## 2021-02-17 RX ORDER — ESCITALOPRAM OXALATE 10 MG/1
20 TABLET ORAL DAILY
Qty: 30 TABLET | Refills: 3 | Status: SHIPPED | OUTPATIENT
Start: 2021-02-17 | End: 2021-09-07

## 2021-02-17 RX ORDER — FLUTICASONE PROPIONATE 220 UG/1
1 AEROSOL, METERED RESPIRATORY (INHALATION) 2 TIMES DAILY
Qty: 12 G | Refills: 3 | Status: SHIPPED | OUTPATIENT
Start: 2021-02-17 | End: 2021-07-27

## 2021-02-17 ASSESSMENT — MIFFLIN-ST. JEOR: SCORE: 1256.8

## 2021-02-17 ASSESSMENT — ASTHMA QUESTIONNAIRES
QUESTION_2 LAST FOUR WEEKS HOW OFTEN HAVE YOU HAD SHORTNESS OF BREATH: MORE THAN ONCE A DAY
ACT_TOTALSCORE: 10
QUESTION_3 LAST FOUR WEEKS HOW OFTEN DID YOUR ASTHMA SYMPTOMS (WHEEZING, COUGHING, SHORTNESS OF BREATH, CHEST TIGHTNESS OR PAIN) WAKE YOU UP AT NIGHT OR EARLIER THAN USUAL IN THE MORNING: FOUR OR MORE NIGHTS A WEEK
QUESTION_5 LAST FOUR WEEKS HOW WOULD YOU RATE YOUR ASTHMA CONTROL: SOMEWHAT CONTROLLED
QUESTION_1 LAST FOUR WEEKS HOW MUCH OF THE TIME DID YOUR ASTHMA KEEP YOU FROM GETTING AS MUCH DONE AT WORK, SCHOOL OR AT HOME: SOME OF THE TIME
QUESTION_4 LAST FOUR WEEKS HOW OFTEN HAVE YOU USED YOUR RESCUE INHALER OR NEBULIZER MEDICATION (SUCH AS ALBUTEROL): ONE OR TWO TIMES PER DAY

## 2021-02-17 NOTE — PROGRESS NOTES
"    Assessment & Plan     Anxiety  Seems that she is still depressed , we discussed increasing the dose of the lexapro to 20 mg as she has been on the 10 mg for a couple of months ,   - escitalopram (LEXAPRO) 10 MG tablet; Take 2 tablets (20 mg) by mouth daily    Moderate persistent asthma without complication  Current moderate episode of major depressive disorder without prior episode (H)  As above increased the dose of the lexapro, also filled out forms for her employer [employer] to have the ability if she is not feeling well to be absent a day or two , or if tardy to work as it has been difficult for her lately to get there on time. She has two kids ages 10 and 4 , seems that anxiety is better now .    Mild intermittent asthma without complication  We discussed the need for her maintenance inhaler , if the Flovent is not covered by her insurance pharmacy needs to call and let us know what is covered , use  ALbuterol for rescue inhaler only   - albuterol (PROAIR HFA/PROVENTIL HFA/VENTOLIN HFA) 108 (90 Base) MCG/ACT inhaler; Inhale 2 puffs into the lungs every 6 hours as needed for shortness of breath / dyspnea or wheezing  - fluticasone (FLOVENT HFA) 220 MCG/ACT inhaler; Inhale 1 puff into the lungs 2 times daily             BMI:   Estimated body mass index is 25.93 kg/m  as calculated from the following:    Height as of this encounter: 1.537 m (5' 0.5\").    Weight as of this encounter: 61.2 kg (135 lb).       She would need to come for a follow up in one month sooner if any concerns   She was here today with her mom , who is supportive , she has family that is suportive     Aurora Youssef MD  [facility]    Mechelle Martinez is a 30 year old who presents for the following health issues for   1) depression ,   Some times she gets really depressed and cannot go to work , she started the lexapro 10 mg back in mid December when she was getting better but now feels depression is getting worse again " ", she has to take care of her 10 and 4 yr olds and works in [employer] , some days is tardy for work , she is bringing in forms FMLA for me to fill out for her depression  2) asthma , she is not able to get the Advair not covered by insurance and needs another maintenance inhaler as the Albuterol needed more recently , she smokes but trying to cut down     HPI        Forms      Review of Systems   Constitutional, HEENT, cardiovascular, pulmonary, GI, , musculoskeletal, neuro, skin, endocrine and psych systems are negative, except as otherwise noted.      Objective    BP (!) 160/100 (BP Location: Right arm, Cuff Size: Adult Regular)   Pulse 92   Temp 98.9  F (37.2  C) (Tympanic)   Resp 16   Ht 1.537 m (5' 0.5\")   Wt 61.2 kg (135 lb)   SpO2 98%   BMI 25.93 kg/m    Body mass index is 25.93 kg/m .  Physical Exam   GENERAL: healthy, alert and no distress  EYES: Eyes grossly normal to inspection, PERRL and conjunctivae and sclerae normal  NECK: no adenopathy, no asymmetry, masses, or scars and thyroid normal to palpation  RESP: lungs clear to auscultation - no rales, rhonchi or wheezes  CV: regular rate and rhythm, normal S1 S2, no S3 or S4, no murmur, click or rub, no peripheral edema and peripheral pulses strong  MS: no gross musculoskeletal defects noted, no edema  NEURO: Normal strength and tone, mentation intact and speech normal    No results found for any visits on 02/17/21.              "

## 2021-02-18 VITALS
DIASTOLIC BLOOD PRESSURE: 80 MMHG | BODY MASS INDEX: 25.49 KG/M2 | RESPIRATION RATE: 16 BRPM | WEIGHT: 135 LBS | TEMPERATURE: 98.9 F | OXYGEN SATURATION: 98 % | HEIGHT: 61 IN | HEART RATE: 92 BPM | SYSTOLIC BLOOD PRESSURE: 138 MMHG

## 2021-02-18 ASSESSMENT — ASTHMA QUESTIONNAIRES: ACT_TOTALSCORE: 10

## 2021-02-22 ENCOUNTER — TELEPHONE (OUTPATIENT)
Dept: FAMILY MEDICINE | Facility: CLINIC | Age: 31
End: 2021-02-22

## 2021-02-22 NOTE — TELEPHONE ENCOUNTER
LS,   Patient calling about paperwork you completed  They also need to know how frequent appts are needed and how long in duration appts will be   Can call or fax info to:  [facility]   Phone [phone]  Fax [phone]  Thanks,  Yolanda SANDOVAL RN

## 2021-02-23 ENCOUNTER — TELEPHONE (OUTPATIENT)
Dept: FAMILY MEDICINE | Facility: CLINIC | Age: 31
End: 2021-02-23

## 2021-02-23 NOTE — TELEPHONE ENCOUNTER
Note from 2/23 TE:  What number is listed as a contact on the form?: [phone] claim # [id number]    Spoke with [facility] and gave them information from LS below.  Nothing further needed from SAVI.  Kiah Weber RN

## 2021-02-23 NOTE — TELEPHONE ENCOUNTER
appointments every 4 weeks, lasting 2 hours - time for pt to come here , have appoitment , drive back etc   Thanks

## 2021-02-23 NOTE — TELEPHONE ENCOUNTER
Reason for Call:  Form, our goal is to have forms completed with 72 hours, however, some forms may require a visit or additional information.    Type of letter, form or note:  disability  Request for medical information to support disability-accomodation medical questionnaire    Who is the form from?: Insurance [facility]    Where did the form come from: form was faxed in 2/23/21    What clinic location was the form placed at?: [facility]    Where the form was placed: Dr Youssef's Box/Folder    What number is listed as a contact on the form?: [phone] claim # [id number]       Additional comments:     Call taken on 2/23/2021 at 1:58 PM by Genie Norman

## 2021-03-16 ENCOUNTER — VIRTUAL VISIT (OUTPATIENT)
Dept: FAMILY MEDICINE | Facility: CLINIC | Age: 31
End: 2021-03-16
Payer: COMMERCIAL

## 2021-03-16 DIAGNOSIS — F41.9 ANXIETY: Primary | ICD-10-CM

## 2021-03-16 DIAGNOSIS — J45.20 MILD INTERMITTENT ASTHMA WITHOUT COMPLICATION: ICD-10-CM

## 2021-03-16 PROCEDURE — 99214 OFFICE O/P EST MOD 30 MIN: CPT | Mod: 95 | Performed by: FAMILY MEDICINE

## 2021-03-16 RX ORDER — ESCITALOPRAM OXALATE 20 MG/1
20 TABLET ORAL DAILY
Qty: 90 TABLET | Refills: 1 | Status: SHIPPED | OUTPATIENT
Start: 2021-03-16 | End: 2021-07-27

## 2021-03-16 ASSESSMENT — ANXIETY QUESTIONNAIRES
7. FEELING AFRAID AS IF SOMETHING AWFUL MIGHT HAPPEN: NOT AT ALL
5. BEING SO RESTLESS THAT IT IS HARD TO SIT STILL: SEVERAL DAYS
GAD7 TOTAL SCORE: 8
3. WORRYING TOO MUCH ABOUT DIFFERENT THINGS: NEARLY EVERY DAY
1. FEELING NERVOUS, ANXIOUS, OR ON EDGE: SEVERAL DAYS
2. NOT BEING ABLE TO STOP OR CONTROL WORRYING: SEVERAL DAYS
IF YOU CHECKED OFF ANY PROBLEMS ON THIS QUESTIONNAIRE, HOW DIFFICULT HAVE THESE PROBLEMS MADE IT FOR YOU TO DO YOUR WORK, TAKE CARE OF THINGS AT HOME, OR GET ALONG WITH OTHER PEOPLE: VERY DIFFICULT
6. BECOMING EASILY ANNOYED OR IRRITABLE: SEVERAL DAYS

## 2021-03-16 ASSESSMENT — PATIENT HEALTH QUESTIONNAIRE - PHQ9
SUM OF ALL RESPONSES TO PHQ QUESTIONS 1-9: 9
5. POOR APPETITE OR OVEREATING: SEVERAL DAYS

## 2021-03-16 NOTE — PROGRESS NOTES
"Michelle is a 30 year old who is being evaluated via a billable video visit.      How would you like to obtain your AVS? MyChart  If the video visit is dropped, the invitation should be resent by: Text to cell phone: [phone]  Will anyone else be joining your video visit? No    Video Start Time: 12:15     Assessment & Plan     Anxiety  We discussed continuing on the current dose of the lexapro as this seems to be working for her   - escitalopram (LEXAPRO) 20 MG tablet; Take 1 tablet (20 mg) by mouth daily  Follow up in three months sooner if any concerns   Pt is aware  and comfortable with the current plan.    Mild intermittent asthma without complication  She has had more issues with her asthma , seems that her mask is making her more SOB and coughing a lot , she wants me to send an order for a face shield as her work will let he ruse a face shield instead   - Miscellaneous Order for DME - ONLY FOR DME       BMI:   Estimated body mass index is 25.93 kg/m  as calculated from the following:    Height as of 2/17/21: 1.537 m (5' 0.5\").    Weight as of 2/17/21: 61.2 kg (135 lb).     Follow up in three months , sooner if any concerns .    Aurora Youssef MD  [facility]    Subjective   Michelle is a 30 year old who presents for the following health issues     HPI     Depression and Anxiety Follow-Up- much better since increasing the dose of the lexapro to 20 mg , now she is feeling better , both anxiety and depression improved now . Denies any side effec ts     How are you doing with your depression since your last visit? Improved     How are you doing with your anxiety since your last visit?  Improved     Are you having other symptoms that might be associated with depression or anxiety? No    Have you had a significant life event? No     Do you have any concerns with your use of alcohol or other drugs? No  2) wondering about an order for a face shiled to use at work as the mask is making her cough " more and thinks her asthma gets worse because of this , gets somewhat SOB more when wearing the mask all day at work, curently is not wheezing , no cough   Social History     Tobacco Use     Smoking status: Current Every Day Smoker     Packs/day: 0.50     Types: Cigarettes     Smokeless tobacco: Never Used     Tobacco comment: sometimes   Substance Use Topics     Alcohol use: No     Alcohol/week: 0.0 standard drinks     Drug use: No     PHQ 8/28/2017 6/12/2018 12/18/2020   PHQ-9 Total Score 0 0 9   Q9: Thoughts of better off dead/self-harm past 2 weeks Not at all Not at all Not at all     ZACHARIAH-7 SCORE 6/12/2018 12/18/2020   Total Score 1 10     Last PHQ-9 3/16/2021   1.  Little interest or pleasure in doing things 1   2.  Feeling down, depressed, or hopeless 1   3.  Trouble falling or staying asleep, or sleeping too much 1   4.  Feeling tired or having little energy 2   5.  Poor appetite or overeating 0   6.  Feeling bad about yourself 1   7.  Trouble concentrating 1   8.  Moving slowly or restless 2   Q9: Thoughts of better off dead/self-harm past 2 weeks 0   PHQ-9 Total Score 9   Difficulty at work, home, or with people -     ZACHARIAH-7  3/16/2021   1. Feeling nervous, anxious, or on edge 1   2. Not being able to stop or control worrying 1   3. Worrying too much about different things 3   4. Trouble relaxing 1   5. Being so restless that it is hard to sit still 1   6. Becoming easily annoyed or irritable 1   7. Feeling afraid, as if something awful might happen 0   ZACHARIAH-7 Total Score 8   If you checked any problems, how difficult have they made it for you to do your work, take care of things at home, or get along with other people? Very difficult       Suicide Assessment Five-step Evaluation and Treatment (SAFE-T)      How many servings of fruits and vegetables do you eat daily?      On average, how many sweetened beverages do you drink each day (Examples: soda, juice, sweet tea, etc.  Do NOT count diet or artificially  sweetened beverages)?       How many days per week do you exercise enough to make your heart beat faster?     How many minutes a day do you exercise enough to make your heart beat faster?     How many days per week do you miss taking your medication?   2)  letter needed from doctor so they she can get a face shield       Review of Systems   Constitutional, HEENT, cardiovascular, pulmonary, GI, , musculoskeletal, neuro, skin, endocrine and psych systems are negative, except as otherwise noted.      Objective           Vitals:  No vitals were obtained today due to virtual visit.    Physical Exam   GENERAL: Healthy, alert and no distress  EYES: Eyes grossly normal to inspection.  No discharge or erythema, or obvious scleral/conjunctival abnormalities.  RESP: No audible wheeze, cough, or visible cyanosis.  No visible retractions or increased work of breathing.    SKIN: Visible skin clear. No significant rash, abnormal pigmentation or lesions.  NEURO: Cranial nerves grossly intact.  Mentation and speech appropriate for age.  PSYCH: Mentation appears normal, affect normal/bright, judgement and insight intact, normal speech and appearance well-groomed.    No results found for this or any previous visit (from the past 24 hour(s)).            Video-Visit Details    Type of service:  Video Visit    Video End Time:12:23 PM    Originating Location (pt. Location): Home    Distant Location (provider location):  [facility]     Platform used for Video Visit: Taya

## 2021-03-17 ASSESSMENT — ANXIETY QUESTIONNAIRES: GAD7 TOTAL SCORE: 8

## 2021-05-08 ENCOUNTER — HEALTH MAINTENANCE LETTER (OUTPATIENT)
Age: 31
End: 2021-05-08

## 2021-05-12 ENCOUNTER — TELEPHONE (OUTPATIENT)
Dept: FAMILY MEDICINE | Facility: CLINIC | Age: 31
End: 2021-05-12

## 2021-05-12 NOTE — TELEPHONE ENCOUNTER
Patient Quality Outreach      Summary:    Patient has the following on her problem list/HM:   Depression / Dysthymia review    6 Month Remission: 4-8 month window range:   12 Month Remission: 10-14 month window range:        PHQ-9 SCORE 6/12/2018 12/18/2020 3/16/2021   PHQ-9 Total Score 0 9 9       If PHQ-9 recheck is 5 or more, route to provider for next steps.    Asthma review       ACT Total Scores 2/17/2021   ACT TOTAL SCORE -   ASTHMA ER VISITS -   ASTHMA HOSPITALIZATIONS -   ACT TOTAL SCORE (Goal Greater than or Equal to 20) 10   In the past 12 months, how many times did you visit the emergency room for your asthma without being admitted to the hospital? 0   In the past 12 months, how many times were you hospitalized overnight because of your asthma? 0          Patient is due/failing the following:   Cervical Cancer Screening - PAP Needed and Adult/Adolescent physical, date due: 11/14/2019    Type of outreach:    Sent letter.    Questions for provider review:    None                                                                                                                                     Sarah Garcia MA on 5/12/2021 at 6:09 PM       Chart routed to .

## 2021-05-12 NOTE — LETTER
[facility]  [address]  [place] [address] [phone]       May 12, 2021    Michelle Joshua  [address] [place] [address]    Dear Michelle,    We care about your health and have reviewed your health plan and are making recommendations based on this review, to optimize your health.    You are in particular need of attention regarding:  -Cervical Cancer Screening  -Wellness (Physical) Visit     We are recommending that you:    -schedule a WELLNESS (Physical) APPOINTMENT with Dr. Youssef - your last physical was in 2018!    -schedule a PAP SMEAR EXAM which is due.  Please disregard this reminder if you have had this exam elsewhere within the last year.  It would be helpful for us to have a copy of your recent pap smear report in our file so that we can best coordinate your care.    If you are under/uninsured, we recommend you contact the [facility]. They offer pap smears at no charge or on a sliding fee charge. You can schedule with them at [phone]. Please have them send us the results.    To address the above recommendations, we encourage you to contact us at [phone], via [employer] or by contacting Central Scheduling toll free at [phone] 24 hours a day. They will assist you with finding the most convenient time and location.    Thank you for trusting [facility] and we appreciate the opportunity to serve you.  We look forward to supporting your healthcare needs in the future.    Healthy Regards,    Your [facility] Team

## 2021-07-26 NOTE — PROGRESS NOTES
"Michelle is a 30 year old who is being evaluated via a billable video visit.      How would you like to obtain your AVS? MyChart  If the video visit is dropped, the invitation should be resent by: Send text to: [phone] or else send to e-mail at: [email or web address]  Will anyone else be joining your video visit? No  {If patient encounters technical issues they should call [phone] :107030}    Video Start Time: {video visit start/end time for provider to select:145522}    {PROVIDER CHARTING PREFERENCE:523903}    Subjective   Michelle is a 30 year old who presents for the following health issues     HPI     Pt here to discuss accommodation forms.    {SUPERLIST (Optional):503092}  {additonal problems for provider to add (Optional):967165}    Review of Systems   {ROS COMP (Optional):575763}      Objective           Vitals:  No vitals were obtained today due to virtual visit.    Physical Exam   {video visit exam brief selected:867747::\"GENERAL: Healthy, alert and no distress\",\"EYES: Eyes grossly normal to inspection.  No discharge or erythema, or obvious scleral/conjunctival abnormalities.\",\"RESP: No audible wheeze, cough, or visible cyanosis.  No visible retractions or increased work of breathing.  \",\"SKIN: Visible skin clear. No significant rash, abnormal pigmentation or lesions.\",\"NEURO: Cranial nerves grossly intact.  Mentation and speech appropriate for age.\",\"PSYCH: Mentation appears normal, affect normal/bright, judgement and insight intact, normal speech and appearance well-groomed.\"}    {Diagnostic Test Results (Optional):544244}    {AMBULATORY ATTESTATION (Optional):599704}        Video-Visit Details    Type of service:  Video Visit    Video End Time:{video visit start/end time for provider to select:708758}    Originating Location (pt. Location): {video visit patient location:857725::\"Home\"}    Distant Location (provider location):  [facility]     Platform used for Video " "Visit: {Virtual Visit Platforms:607957::\"Evelyn\"}  "

## 2021-07-27 ENCOUNTER — VIRTUAL VISIT (OUTPATIENT)
Dept: FAMILY MEDICINE | Facility: CLINIC | Age: 31
End: 2021-07-27
Payer: COMMERCIAL

## 2021-07-27 ENCOUNTER — TELEPHONE (OUTPATIENT)
Dept: FAMILY MEDICINE | Facility: CLINIC | Age: 31
End: 2021-07-27

## 2021-07-27 DIAGNOSIS — T78.2XXS BEE STING-INDUCED ANAPHYLAXIS, ACCIDENTAL OR UNINTENTIONAL, SEQUELA: ICD-10-CM

## 2021-07-27 DIAGNOSIS — J06.9 VIRAL URI WITH COUGH: ICD-10-CM

## 2021-07-27 DIAGNOSIS — F41.9 ANXIETY: Primary | ICD-10-CM

## 2021-07-27 DIAGNOSIS — T63.441S BEE STING-INDUCED ANAPHYLAXIS, ACCIDENTAL OR UNINTENTIONAL, SEQUELA: ICD-10-CM

## 2021-07-27 DIAGNOSIS — Z91.09 ENVIRONMENTAL ALLERGIES: ICD-10-CM

## 2021-07-27 DIAGNOSIS — J45.20 MILD INTERMITTENT ASTHMA WITHOUT COMPLICATION: ICD-10-CM

## 2021-07-27 PROCEDURE — 99214 OFFICE O/P EST MOD 30 MIN: CPT | Performed by: FAMILY MEDICINE

## 2021-07-27 RX ORDER — FLUTICASONE PROPIONATE 220 UG/1
1 AEROSOL, METERED RESPIRATORY (INHALATION) 2 TIMES DAILY
Qty: 12 G | Refills: 3 | Status: SHIPPED | OUTPATIENT
Start: 2021-07-27 | End: 2022-01-06

## 2021-07-27 RX ORDER — LORATADINE 10 MG/1
10 TABLET ORAL DAILY
Qty: 90 TABLET | Refills: 3 | Status: SHIPPED | OUTPATIENT
Start: 2021-07-27

## 2021-07-27 RX ORDER — ALBUTEROL SULFATE 90 UG/1
2 AEROSOL, METERED RESPIRATORY (INHALATION) EVERY 6 HOURS PRN
Qty: 18 G | Refills: 4 | Status: SHIPPED | OUTPATIENT
Start: 2021-07-27 | End: 2021-10-28

## 2021-07-27 RX ORDER — ESCITALOPRAM OXALATE 20 MG/1
20 TABLET ORAL DAILY
Qty: 90 TABLET | Refills: 1 | Status: SHIPPED | OUTPATIENT
Start: 2021-07-27 | End: 2021-11-10

## 2021-07-27 RX ORDER — EPINEPHRINE 0.3 MG/.3ML
0.3 INJECTION SUBCUTANEOUS
Qty: 0.6 ML | Refills: 0 | Status: SHIPPED | OUTPATIENT
Start: 2021-07-27 | End: 2021-11-03

## 2021-07-27 ASSESSMENT — ANXIETY QUESTIONNAIRES
GAD7 TOTAL SCORE: 12
1. FEELING NERVOUS, ANXIOUS, OR ON EDGE: NEARLY EVERY DAY
2. NOT BEING ABLE TO STOP OR CONTROL WORRYING: SEVERAL DAYS
3. WORRYING TOO MUCH ABOUT DIFFERENT THINGS: NEARLY EVERY DAY
5. BEING SO RESTLESS THAT IT IS HARD TO SIT STILL: SEVERAL DAYS
7. FEELING AFRAID AS IF SOMETHING AWFUL MIGHT HAPPEN: NOT AT ALL
6. BECOMING EASILY ANNOYED OR IRRITABLE: SEVERAL DAYS
IF YOU CHECKED OFF ANY PROBLEMS ON THIS QUESTIONNAIRE, HOW DIFFICULT HAVE THESE PROBLEMS MADE IT FOR YOU TO DO YOUR WORK, TAKE CARE OF THINGS AT HOME, OR GET ALONG WITH OTHER PEOPLE: SOMEWHAT DIFFICULT

## 2021-07-27 ASSESSMENT — PATIENT HEALTH QUESTIONNAIRE - PHQ9
SUM OF ALL RESPONSES TO PHQ QUESTIONS 1-9: 9
5. POOR APPETITE OR OVEREATING: NEARLY EVERY DAY

## 2021-07-27 ASSESSMENT — MIFFLIN-ST. JEOR: SCORE: 1241.03

## 2021-07-27 NOTE — TELEPHONE ENCOUNTER
LS  Please see below message  Looks like you already have most of this pended already from [facility]?    Thank you,  Jemima Griggs RN

## 2021-07-27 NOTE — TELEPHONE ENCOUNTER
Patient states she had an appointment with Dr Youssef today and was expecting meds to be sent for    Anxiety     Depression    Her epi pen    Inhaler    Allergy medication.  When she went to the pharmacy they did not have anything.  Please advise.  Nereida James RN  [facility]

## 2021-07-27 NOTE — PROGRESS NOTES
"    Assessment & Plan     Anxiety  I have filled out forms for her work , as she has had a few episodes of anxiety where she was not able to go to work and needs to take a few days off , she is on lexapro 20 mg and will continue as her depression is much better now , discussed other meds for anxiety but she will benefit form seeing a psychiatrist and I gave her the referral .  - MENTAL HEALTH REFERRAL  - Adult; Psychiatry; Psychiatry; G: Collaborative Care Psychiatry Service/Bridge to Long-Term Psychiatry as indicated ([phone]); Yes; Several Medications tried and failed; Yes; We will contact you to schedule the a...; Future  - escitalopram (LEXAPRO) 20 MG tablet; Take 1 tablet (20 mg) by mouth daily  RTC if no improving or worsening.      Bee sting-induced anaphylaxis, accidental or unintentional, sequela  refilled as her old one has    - EPINEPHrine (ANY BX GENERIC EQUIV) 0.3 MG/0.3ML injection 2-pack; Inject 0.3 mLs (0.3 mg) into the muscle once as needed for anaphylaxis    Mild intermittent asthma without complication  Refilled her inhalers   - albuterol (PROAIR HFA/PROVENTIL HFA/VENTOLIN HFA) 108 (90 Base) MCG/ACT inhaler; Inhale 2 puffs into the lungs every 6 hours as needed for shortness of breath / dyspnea or wheezing  - fluticasone (FLOVENT HFA) 220 MCG/ACT inhaler; Inhale 1 puff into the lungs 2 times daily    Environmental allergies  Refilled for her allergies   - loratadine (CLARITIN) 10 MG tablet; Take 1 tablet (10 mg) by mouth daily    BMI:   Estimated body mass index is 25.95 kg/m  as calculated from the following:    Height as of this encounter: 1.525 m (5' 0.05\").    Weight as of this encounter: 60.4 kg (133 lb 1.6 oz).     Follow up in one [facility] sooner if any concerns     Aurora Youssef MD  [facility]    Mechelle Martinez is a 30 year old who presents for the following health issues     HPI     Refills on medications    ACT Total Scores 10/15/2020 2021 " 7/27/2021   ACT TOTAL SCORE - - -   ASTHMA ER VISITS - - -   ASTHMA HOSPITALIZATIONS - - -   ACT TOTAL SCORE (Goal Greater than or Equal to 20) 14 10 6   In the past 12 months, how many times did you visit the emergency room for your asthma without being admitted to the hospital? 0 0 0   In the past 12 months, how many times were you hospitalized overnight because of your asthma? 0 0 0     ZACHARIAH-7 SCORE 12/18/2020 3/16/2021 7/27/2021   Total Score 10 8 12     PHQ 12/18/2020 3/16/2021 7/27/2021   PHQ-9 Total Score 9 9 9   Q9: Thoughts of better off dead/self-harm past 2 weeks Not at all Not at all Not at all     Pt here to discuss accomodation forms. She states that her depression is controlled but she has had a few anxiety episodes that were severe and she had to stay off work, needs ot have that on her work forms that she can take time off when that happens , she has been on the Lexapro 20 mg since mid march and that has been going good overall , except she has been under more stress           Review of Systems   Constitutional, HEENT, cardiovascular, pulmonary, GI, , musculoskeletal, neuro, skin, endocrine and psych systems are negative, except as otherwise noted.      Objective    There were no vitals taken for this visit.  There is no height or weight on file to calculate BMI.  Physical Exam   GENERAL: healthy, alert and no distress  EYES: Eyes grossly normal to inspection, PERRL and conjunctivae and sclerae normal  NECK: no adenopathy, no asymmetry, masses, or scars and thyroid normal to palpation  RESP: lungs clear to auscultation - no rales, rhonchi or wheezes  CV: regular rate and rhythm, normal S1 S2, no S3 or S4, no murmur, click or rub, no peripheral edema and peripheral pulses strong  MS: no gross musculoskeletal defects noted, no edema  NEURO: Normal strength and tone, mentation intact and speech normal    No results found for any visits on 07/27/21.

## 2021-07-28 ASSESSMENT — ANXIETY QUESTIONNAIRES: GAD7 TOTAL SCORE: 12

## 2021-07-28 ASSESSMENT — ASTHMA QUESTIONNAIRES: ACT_TOTALSCORE: 6

## 2021-07-29 ENCOUNTER — TELEPHONE (OUTPATIENT)
Dept: FAMILY MEDICINE | Facility: CLINIC | Age: 31
End: 2021-07-29

## 2021-07-29 NOTE — TELEPHONE ENCOUNTER
Prior Authorization Retail Medication Request    Medication/Dose: EPINEPHrine (ANY BX GENERIC EQUIV) 0.3 MG/0.3ML injection 2-pack  ICD code (if different than what is on RX):    Previously Tried and Failed:    Rationale:      Insurance Name:  [phone]  Insurance ID: [id number]         Pharmacy Information (if different than what is on RX)  Name:  Sharn[facility]  Phone:  [phone]

## 2021-07-29 NOTE — TELEPHONE ENCOUNTER
Central Prior Authorization Team   Phone: [phone]      PA Initiation    Medication: EPINEPHrine (ANY BX GENERIC EQUIV) 0.3 MG/0.3ML injection 2-pack - INITIATED  Insurance Company: [employer] - Phone [phone] Fax [phone]  Pharmacy Filling the Rx: [id number] [place], [place] - [address] AT [facility] & [address] [place] [address]  Filling Pharmacy Phone: [phone]  Filling Pharmacy Fax: [phone]  Start Date: 7/29/2021

## 2021-07-30 NOTE — TELEPHONE ENCOUNTER
PRIOR AUTHORIZATION DENIED    Medication: EPINEPHrine (ANY BX GENERIC EQUIV) 0.3 MG/0.3ML injection 2-pack - DENIED    Denial Date: 7/30/2021    Denial Rational:       Appeal Information:

## 2021-08-01 VITALS
OXYGEN SATURATION: 97 % | HEART RATE: 103 BPM | BODY MASS INDEX: 26.13 KG/M2 | TEMPERATURE: 97.2 F | DIASTOLIC BLOOD PRESSURE: 80 MMHG | WEIGHT: 133.1 LBS | SYSTOLIC BLOOD PRESSURE: 138 MMHG | HEIGHT: 60 IN

## 2021-09-07 ENCOUNTER — VIRTUAL VISIT (OUTPATIENT)
Dept: BEHAVIORAL HEALTH | Facility: CLINIC | Age: 31
End: 2021-09-07
Attending: FAMILY MEDICINE
Payer: COMMERCIAL

## 2021-09-07 ENCOUNTER — VIRTUAL VISIT (OUTPATIENT)
Dept: PSYCHIATRY | Facility: CLINIC | Age: 31
End: 2021-09-07
Attending: FAMILY MEDICINE
Payer: COMMERCIAL

## 2021-09-07 DIAGNOSIS — F41.1 GENERALIZED ANXIETY DISORDER WITH PANIC ATTACKS: Primary | ICD-10-CM

## 2021-09-07 DIAGNOSIS — F33.1 MODERATE RECURRENT MAJOR DEPRESSION (H): ICD-10-CM

## 2021-09-07 DIAGNOSIS — F32.A DEPRESSION, UNSPECIFIED DEPRESSION TYPE: ICD-10-CM

## 2021-09-07 DIAGNOSIS — F41.1 GENERALIZED ANXIETY DISORDER: Primary | ICD-10-CM

## 2021-09-07 DIAGNOSIS — F17.200 NICOTINE DEPENDENCE, UNCOMPLICATED, UNSPECIFIED NICOTINE PRODUCT TYPE: ICD-10-CM

## 2021-09-07 DIAGNOSIS — F41.9 ANXIETY: ICD-10-CM

## 2021-09-07 DIAGNOSIS — F41.0 GENERALIZED ANXIETY DISORDER WITH PANIC ATTACKS: Primary | ICD-10-CM

## 2021-09-07 PROCEDURE — 99205 OFFICE O/P NEW HI 60 MIN: CPT | Mod: 95 | Performed by: PSYCHIATRY & NEUROLOGY

## 2021-09-07 PROCEDURE — 90791 PSYCH DIAGNOSTIC EVALUATION: CPT | Mod: 52 | Performed by: SOCIAL WORKER

## 2021-09-07 RX ORDER — ESCITALOPRAM OXALATE 20 MG/1
TABLET ORAL
Qty: 30 TABLET | Refills: 1 | Status: SHIPPED | OUTPATIENT
Start: 2021-09-07 | End: 2021-11-10

## 2021-09-07 RX ORDER — ZOLPIDEM TARTRATE 5 MG/1
5 TABLET ORAL
Qty: 30 TABLET | Refills: 0 | Status: SHIPPED | OUTPATIENT
Start: 2021-09-07 | End: 2021-11-10

## 2021-09-07 ASSESSMENT — ANXIETY QUESTIONNAIRES
5. BEING SO RESTLESS THAT IT IS HARD TO SIT STILL: NEARLY EVERY DAY
1. FEELING NERVOUS, ANXIOUS, OR ON EDGE: SEVERAL DAYS
3. WORRYING TOO MUCH ABOUT DIFFERENT THINGS: NEARLY EVERY DAY
7. FEELING AFRAID AS IF SOMETHING AWFUL MIGHT HAPPEN: NOT AT ALL
6. BECOMING EASILY ANNOYED OR IRRITABLE: SEVERAL DAYS
GAD7 TOTAL SCORE: 12
IF YOU CHECKED OFF ANY PROBLEMS ON THIS QUESTIONNAIRE, HOW DIFFICULT HAVE THESE PROBLEMS MADE IT FOR YOU TO DO YOUR WORK, TAKE CARE OF THINGS AT HOME, OR GET ALONG WITH OTHER PEOPLE: EXTREMELY DIFFICULT
2. NOT BEING ABLE TO STOP OR CONTROL WORRYING: SEVERAL DAYS

## 2021-09-07 ASSESSMENT — PATIENT HEALTH QUESTIONNAIRE - PHQ9
5. POOR APPETITE OR OVEREATING: NEARLY EVERY DAY
SUM OF ALL RESPONSES TO PHQ QUESTIONS 1-9: 17
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
## 2021-09-07 NOTE — Clinical Note
Aurora Carrizales,    I met with Michelle today.  She reports that she was doing fairly well on her Lexapro 20 mg daily, so thought she did not need it anymore and has gradually discontinued it.  We agreed to restart it, as she did not feel it was beneficial.  She will see me again in case she needs adjunctive treatment.  She was wondering about additional FMLA paperwork.  I referred her back to you, as you have done it in the past, and she will most likely be returning to your care.  If you are not comfortable completing it, I will be happy to.    Please feel free to contact me with questions or concerns.  Thank you for the opportunity to be involved in the care of this patient.    Regards,  Mari Camara MD  Collaborative Care Psychiatry  [facility]

## 2021-09-07 NOTE — PATIENT INSTRUCTIONS
Restart escitalopram (Lexapro) 10 mg daily for 4 to 7 days, then increase to 20 mg daily.    Continue all other medications per your primary care provider.    Schedule an appointment with me in 4 weeks or sooner as needed.  You may call [facility] at [phone] to schedule.    [place] Resources:      Go to the Emergency Department as needed or call after hours crisis line at [phone].      To schedule individual or family therapy, call [facility] at [phone].     Follow up with primary care provider as planned or sooner for acute medical concerns.    Call the psychiatric nurse line with medication questions or concerns at [phone].    [employer] may be used to communicate with your provider, but this is not intended to be used for emergencies.    Community Resources:      National Suicide Prevention Lifeline: 822.897.2131 (TTY: 399.426.7103). Call anytime for help.  (www.suicidepreventionlifeline.org)    National [place] on Mental Illness (www.[place].org): 476.769.8666 or 298-231-8994.     Mental Health Association (www.mentalhealth.org): 790.364.7201 or 608-577-2082.    [place] Crisis Text Line: Text [place] to 953091    Suicide LifeLine Chat: suicidepreventionlifeline.org/chat
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
## 2021-09-08 ASSESSMENT — COLUMBIA-SUICIDE SEVERITY RATING SCALE - C-SSRS
ATTEMPT PAST THREE MONTHS: NO
2. HAVE YOU ACTUALLY HAD ANY THOUGHTS OF KILLING YOURSELF?: NO
2. HAVE YOU ACTUALLY HAD ANY THOUGHTS OF KILLING YOURSELF LIFETIME?: NO
6. HAVE YOU EVER DONE ANYTHING, STARTED TO DO ANYTHING, OR PREPARED TO DO ANYTHING TO END YOUR LIFE?: NO
1. IN THE PAST MONTH, HAVE YOU WISHED YOU WERE DEAD OR WISHED YOU COULD GO TO SLEEP AND NOT WAKE UP?: NO
TOTAL  NUMBER OF ABORTED OR SELF INTERRUPTED ATTEMPTS PAST LIFETIME: NO
TOTAL  NUMBER OF INTERRUPTED ATTEMPTS LIFETIME: NO
6. HAVE YOU EVER DONE ANYTHING, STARTED TO DO ANYTHING, OR PREPARED TO DO ANYTHING TO END YOUR LIFE?: NO
ATTEMPT LIFETIME: NO
4. HAVE YOU HAD THESE THOUGHTS AND HAD SOME INTENTION OF ACTING ON THEM?: NO
1. IN THE PAST MONTH, HAVE YOU WISHED YOU WERE DEAD OR WISHED YOU COULD GO TO SLEEP AND NOT WAKE UP?: NO
3. HAVE YOU BEEN THINKING ABOUT HOW YOU MIGHT KILL YOURSELF?: NO
5. HAVE YOU STARTED TO WORK OUT OR WORKED OUT THE DETAILS OF HOW TO KILL YOURSELF? DO YOU INTEND TO CARRY OUT THIS PLAN?: NO
4. HAVE YOU HAD THESE THOUGHTS AND HAD SOME INTENTION OF ACTING ON THEM?: NO
5. HAVE YOU STARTED TO WORK OUT OR WORKED OUT THE DETAILS OF HOW TO KILL YOURSELF? DO YOU INTEND TO CARRY OUT THIS PLAN?: NO
TOTAL  NUMBER OF INTERRUPTED ATTEMPTS PAST 3 MONTHS: NO

## 2021-09-08 ASSESSMENT — ANXIETY QUESTIONNAIRES: GAD7 TOTAL SCORE: 12

## 2021-10-23 ENCOUNTER — HEALTH MAINTENANCE LETTER (OUTPATIENT)
Age: 31
End: 2021-10-23

## 2021-10-27 ENCOUNTER — TELEPHONE (OUTPATIENT)
Dept: FAMILY MEDICINE | Facility: CLINIC | Age: 31
End: 2021-10-27

## 2021-10-27 PROBLEM — R40.2431: Status: ACTIVE | Noted: 2021-10-27

## 2021-10-27 PROBLEM — R40.1 OBTUNDED: Status: ACTIVE | Noted: 2021-10-27

## 2021-10-27 RX ORDER — HYDROCODONE BITARTRATE AND ACETAMINOPHEN 5; 325 MG/1; MG/1
1-2 TABLET ORAL
COMMUNITY
Start: 2019-10-18 | End: 2022-01-06

## 2021-10-27 NOTE — TELEPHONE ENCOUNTER
PT says her whole family is sick with URI sx.  PT made virtual appts for her son and herself.    Covid negative on 10/27/21.  VIPUL Mccracken

## 2021-10-28 ENCOUNTER — VIRTUAL VISIT (OUTPATIENT)
Dept: FAMILY MEDICINE | Facility: CLINIC | Age: 31
End: 2021-10-28
Payer: COMMERCIAL

## 2021-10-28 DIAGNOSIS — J45.31 MILD PERSISTENT ASTHMA WITH ACUTE EXACERBATION: ICD-10-CM

## 2021-10-28 DIAGNOSIS — R05.9 COUGH: ICD-10-CM

## 2021-10-28 DIAGNOSIS — J06.9 UPPER RESPIRATORY TRACT INFECTION, UNSPECIFIED TYPE: Primary | ICD-10-CM

## 2021-10-28 PROCEDURE — 99214 OFFICE O/P EST MOD 30 MIN: CPT | Mod: 95 | Performed by: FAMILY MEDICINE

## 2021-10-28 RX ORDER — GUAIFENESIN/DEXTROMETHORPHAN 100-10MG/5
10 SYRUP ORAL EVERY 4 HOURS PRN
Qty: 236 ML | Refills: 0 | Status: SHIPPED | OUTPATIENT
Start: 2021-10-28 | End: 2022-04-06

## 2021-10-28 RX ORDER — ALBUTEROL SULFATE 90 UG/1
2 AEROSOL, METERED RESPIRATORY (INHALATION) EVERY 6 HOURS PRN
Qty: 18 G | Refills: 4 | Status: SHIPPED | OUTPATIENT
Start: 2021-10-28 | End: 2022-03-31

## 2021-10-28 NOTE — PATIENT INSTRUCTIONS
"Discharge Instructions for COVID-19 Patients  You may have COVID-19. Please follow the instructions listed below.   If you have a weakened immune system, discuss with your doctor any other actions you need to take.  How can I protect others?  If you have symptoms (fever, cough, body aches or trouble breathing):    Stay home and away from others (self-isolate) until:  ? Your other symptoms have resolved (gotten better). And   ? You've had no fever--and no medicine that reduces fever--for 1 full day (24 hours). And   ? At least 10 days have passed since your symptoms started. (You may need to wait 20 days. Follow the advice of your care team.)  If you don't show symptoms, but testing showed that you have COVID-19:    Stay home and away from others (self-isolate) until at least 10 days have passed since the date of your first positive COVID-19 test.  During this time    Stay in your own room, even for meals. Use your own bathroom if you can.    Stay away from others in your home. No hugging, kissing or shaking hands. No visitors.    Don't go to work, school or anywhere else.    Clean \"high touch\" surfaces often (doorknobs, counters, handles). Use household cleaning spray or wipes.    You'll find a full list of  on the EPA website: www.epa.gov/pesticide-registration/list-n-disinfectants-use-against-sars-cov-2.    Cover your mouth and nose with a mask or other face covering to avoid spreading germs.    Wash your hands and face often. Use soap and water.    Caregivers in these groups are at risk for severe illness due to COVID-19:  ? People 65 years and older  ? People who live in a nursing home or long-term care facility  ? People with chronic disease (lung, heart, cancer, diabetes, kidney, liver, immunologic)  ? People who have a weakened immune system, including those who:    Are in cancer treatment    Take medicine that weakens the immune system, such as corticosteroids    Had a bone marrow or organ " transplant    Have an immune deficiency    Have poorly controlled HIV or AIDS    Are obese (body mass index of 40 or higher)    Smoke regularly    Caregivers should wear gloves while washing dishes, handling laundry and cleaning bedrooms and bathrooms.    Use caution when washing and drying laundry: Don't shake dirty laundry and use the warmest water setting that you can.    For more tips on managing your health at home, go to www.cdc.gov/coronavirus/2019-ncov/downloads/10Things.pdf.  How can I take care of myself at home?  1. Get lots of rest. Drink extra fluids (unless a doctor has told you not to).  2. Take Tylenol (acetaminophen) for fever or pain. If you have liver or kidney problems, ask your family doctor if it's okay to take Tylenol.   Adults can take either:   ? 650 mg (two 325 mg pills) every 4 to 6 hours, or   ? 1,000 mg (two 500 mg pills) every 8 hours as needed.  ? Note: Don't take more than 3,000 mg in one day. Acetaminophen is found in many medicines (both prescribed and over-the-counter medicines). Read all labels to be sure you don't take too much.   For children, check the Tylenol bottle for the right dose. The dose is based on the child's age or weight.  3. If you have other health problems (like cancer, heart failure, an organ transplant or severe kidney disease): Call your specialty clinic if you don't feel better in the next 2 days.  4. Know when to call 911. Emergency warning signs include:  ? Trouble breathing or shortness of breath  ? Pain or pressure in the chest that doesn't go away  ? Feeling confused like you haven't felt before, or not being able to wake up  ? Bluish-colored lips or face  5. Your doctor may have prescribed a blood thinner medicine. Follow their instructions.  Where can I get more information?     [facility] - About COVID-19:   [email or web address]    CDC - What to Do If You're Sick:  www.cdc.gov/coronavirus/2019-ncov/about/steps-when-sick.html    CDC - Ending Home Isolation: www.cdc.gov/coronavirus/2019-ncov/hcp/disposition-in-home-patients.html    CDC - Caring for Someone: www.cdc.gov/coronavirus/2019-ncov/if-you-are-sick/care-for-someone.html    [facility] - Interim Guidance for Hospital Discharge to Home: www.health.[facility].[place]./diseases/coronavirus/hcp/hospdischarge.pdf    Below are the COVID-19 hotlines at the [place] Department of Health ([facility]). Interpreters are available.  ? For health questions: Call 983-322-8705 or 1-812.623.9547 (7 a.m. to 7 p.m.)  ? For questions about schools and childcare: Call 047-781-2771 or 1-661.215.6801 (7 a.m. to 7 p.m.)    For informational purposes only. Not to replace the advice of your health care provider. Clinically reviewed by Dr. Quinn Garza.   Copyright   2020 [facility]. All rights reserved. [employer] [id number] - REV 01/05/21.

## 2021-10-28 NOTE — PROGRESS NOTES
Michelle is a 30 year old who is being evaluated via a billable telephone visit.      What phone number would you like to be contacted at? [phone]  How would you like to obtain your AVS? MyChart    Assessment & Plan       Upper respiratory tract infection, unspecified type  cough  First test for Covid was negative.  She has been vaccinated with the Pfizer Covid vaccine.  Discussed repeat Covid test at this point as the first test may have been too early to detect Covid.  Recommend fluids, rest, ibuprofen and Tylenol as needed for discomfort and Robitussin-DM as needed for cough.  She requests a prescription for Robitussin-DM today.     - Symptomatic COVID-19 Virus (Coronavirus) by PCR; Future  - guaiFENesin-dextromethorphan (ROBITUSSIN DM) 100-10 MG/5ML syrup; Take 10 mLs by mouth every 4 hours as needed for cough or congestion    Mild persistent asthma with mild exacerbation  Mild increase in asthma symptoms since URI started.  She is out of albuterol.  Refilled today.  - albuterol (PROAIR HFA/PROVENTIL HFA/VENTOLIN HFA) 108 (90 Base) MCG/ACT inhaler; Inhale 2 puffs into the lungs every 6 hours as needed for shortness of breath / dyspnea or wheezing        Return in about 1 week (around 11/4/2021) for Follow up if not improving.     Amanda Stanley MD  [facility]   Michelle is a 30 year old who presents for the following health issues     HPI     Acute Illness  Acute illness concerns: URI  Onset/Duration: saturday  Symptoms:  Fever: YES  Chills/Sweats: no  Headache (location?): no  Sinus Pressure: no  Conjunctivitis:  no  Ear Pain: no  Rhinorrhea: no  Congestion: YES  Sore Throat: YES  Cough: YES-productive of green sputum  Wheeze: no  Decreased Appetite: YES  Nausea: YES  Vomiting: no  Diarrhea: no  Dysuria/Freq.: no  Dysuria or Hematuria: no  Fatigue/Achiness: YES- fatigue  Sick/Strep Exposure: YES- mother and son  Therapies tried and outcome: none    Patient states  her asthma is flaring up due to her URI. Her covid test was negative on 10/27/2021.      She is out of albuterol    Review of Systems         Objective           Vitals:  No vitals were obtained today due to virtual visit.    Physical Exam   healthy, alert and no distress  PSYCH: Alert and oriented times 3; coherent speech, normal   rate and volume, able to articulate logical thoughts, able   to abstract reason, no tangential thoughts, no hallucinations   or delusions  Her affect is normal  RESP: No cough, no audible wheezing, able to talk in full sentences  Remainder of exam unable to be completed due to telephone visits                  Phone call duration: 15 minutes

## 2021-11-01 ENCOUNTER — TELEPHONE (OUTPATIENT)
Dept: FAMILY MEDICINE | Facility: CLINIC | Age: 31
End: 2021-11-01

## 2021-11-01 NOTE — TELEPHONE ENCOUNTER
Had visit on 10/28 with Dr. Stanley:  Upper respiratory tract infection, unspecified type  cough  First test for Covid was negative.  She has been vaccinated with the Pfizer Covid vaccine.  Discussed repeat Covid test at this point as the first test may have been too early to detect Covid.  Recommend fluids, rest, ibuprofen and Tylenol as needed for discomfort and Robitussin-DM as needed for cough.  She requests a prescription for Robitussin-DM today.     - Symptomatic COVID-19 Virus (Coronavirus) by PCR; Future  - guaiFENesin-dextromethorphan (ROBITUSSIN DM) 100-10 MG/5ML syrup; Take 10 mLs by mouth every 4 hours as needed for cough or congestion     Mild persistent asthma with mild exacerbation  Mild increase in asthma symptoms since URI started.  She is out of albuterol.  Refilled today.  - albuterol (PROAIR HFA/PROVENTIL HFA/VENTOLIN HFA) 108 (90 Base) MCG/ACT inhaler; Inhale 2 puffs into the lungs every 6 hours as needed for shortness of breath / dyspnea or wheezing     Return in about 1 week (around 11/4/2021) for Follow up if not improving.      Patient calling in upset. States provider was going to send an antibiotic. I relayed this was not documented in plan. States she now has an ear infection. I recommended in person visit for assessment of ear and patient declines. Only wants antibiotic sent in. She requests message be sent to PCP to ask for med.  Ashly Luong RN

## 2021-11-01 NOTE — TELEPHONE ENCOUNTER
I did not recommend or say I would be prescribing an antibiotic due to suspicion for viral infection, which is not treated by antibiotics. I went through the medications that I was sending for her in detail at the time of her visit. Please let her know if she is feeling worse, then she should be seen in person in the clinic or urgent care setting. Amanda Stanley M.D.

## 2021-11-01 NOTE — TELEPHONE ENCOUNTER
You can send this to the provider who saw her !  Otherwise , I would need to see her , cannot prescribe without a visit   Thanks

## 2021-11-02 ENCOUNTER — MYC MEDICAL ADVICE (OUTPATIENT)
Dept: FAMILY MEDICINE | Facility: CLINIC | Age: 31
End: 2021-11-02

## 2021-11-02 NOTE — PROGRESS NOTES
Assessment & Plan     Bronchitis  Will treat with Abx , also she has cough syrup and Albuterol to use   - azithromycin (ZITHROMAX) 250 MG tablet; Take 2 tablets (500 mg) by mouth daily for 1 day, THEN 1 tablet (250 mg) daily for 4 days.    Acute otitis externa of left ear, unspecified type  Will treat with ear drops   - neomycin-polymyxin-hydrocortisone (CORTISPORIN) 3.5-44553-7 otic suspension; Place 3 drops Into the left ear 4 times daily    Mild persistent asthma with acute exacerbation  She has been using the Albuterol that is helping , no wheezing on exam today , will treat the bronchitis with the Abx and see if cough gets better , discussed fluids rest , if any worsening symptoms would need to be seen     RTC if no improving or worsening.  Pt is aware  and comfortable with the current plan.      Aurora Youssef MD  [facility]    Mechelle Martinez is a 30 year old who presents for the following health issues     HPI     Concern - Ear pain  Ear four days ago , 2 weeks of the cough coughing up stuff , when she cough   Onset: 4 days ago  Description: Pt can not hear from her right ear. Pt has noticed some brown fluid from her ear  Intensity: moderate  Progression of Symptoms:  worsening  Accompanying Signs & Symptoms: drainage from the left ear, hard for her to hear.   Previous history of similar problem: None  Precipitating factors:        Worsened by: None  Alleviating factors:        Improved by: none  Therapies tried and outcome: None    Acute Illness  Acute illness concerns: Coughing  Onset/Duration: Pt has been experiencing cough for about 2 weeks when she was sick  Symptoms:  Fever: no  Chills/Sweats: no  Headache (location?): no  Sinus Pressure: YES  Conjunctivitis:  no  Ear Pain: YES: left  Rhinorrhea: no  Congestion: YES  Sore Throat: no  Cough: YES  Wheeze: YES  Decreased Appetite: YES  Nausea: no  Vomiting: YES  Diarrhea: no  Dysuria/Freq.: no  Dysuria or Hematuria:  "no  Fatigue/Achiness: no  Sick/Strep Exposure: no  Therapies tried and outcome: None    Review of Systems   Constitutional, HEENT, cardiovascular, pulmonary, GI, , musculoskeletal, neuro, skin, endocrine and psych systems are negative, except as otherwise noted.      Objective    BP (!) 141/95   Pulse 88   Temp 97.3  F (36.3  C)   Ht 1.529 m (5' 0.2\")   Wt 61.2 kg (134 lb 14.4 oz)   LMP 10/19/2021 (Exact Date)   SpO2 98%   BMI 26.17 kg/m    Body mass index is 26.17 kg/m .  Physical Exam   GENERAL: healthy, alert and no distress  EYES: Eyes grossly normal to inspection, PERRL and conjunctivae and sclerae normal  HENT: normal cephalic/atraumatic, left ear: clear effusion and erythematous external canal , nose and mouth without ulcers or lesions, oropharynx clear and oral mucous membranes moist  NECK: no adenopathy, no asymmetry, masses, or scars and thyroid normal to palpation  RESP: lungs clear to auscultation - no rales, rhonchi or wheezes  CV: regular rate and rhythm, normal S1 S2, no S3 or S4, no murmur, click or rub, no peripheral edema and peripheral pulses strong  ABDOMEN: soft, nontender, no hepatosplenomegaly, no masses and bowel sounds normal  MS: no gross musculoskeletal defects noted, no edema    No results found for this or any previous visit (from the past 24 hour(s)).            "

## 2021-11-02 NOTE — TELEPHONE ENCOUNTER
Called patient: no answer and unable to leave message because voicemail box is full.    Patient can return missed call to clinic or check Tip or Skiphart messages.    Tip or Skiphart message sent to patient.    ROXANE Acevedo, RN  [facility]

## 2021-11-03 ENCOUNTER — OFFICE VISIT (OUTPATIENT)
Dept: FAMILY MEDICINE | Facility: CLINIC | Age: 31
End: 2021-11-03
Payer: COMMERCIAL

## 2021-11-03 VITALS
DIASTOLIC BLOOD PRESSURE: 80 MMHG | WEIGHT: 134.9 LBS | HEIGHT: 60 IN | HEART RATE: 88 BPM | TEMPERATURE: 97.3 F | BODY MASS INDEX: 26.48 KG/M2 | SYSTOLIC BLOOD PRESSURE: 130 MMHG | OXYGEN SATURATION: 98 %

## 2021-11-03 DIAGNOSIS — J40 BRONCHITIS: Primary | ICD-10-CM

## 2021-11-03 DIAGNOSIS — H60.502 ACUTE OTITIS EXTERNA OF LEFT EAR, UNSPECIFIED TYPE: ICD-10-CM

## 2021-11-03 DIAGNOSIS — J45.31 MILD PERSISTENT ASTHMA WITH ACUTE EXACERBATION: ICD-10-CM

## 2021-11-03 PROCEDURE — 99214 OFFICE O/P EST MOD 30 MIN: CPT | Performed by: FAMILY MEDICINE

## 2021-11-03 RX ORDER — NEOMYCIN SULFATE, POLYMYXIN B SULFATE, BACITRACIN ZINC, HYDROCORTISONE 3.5; 10000; 400; 1 MG/G; [USP'U]/G; [USP'U]/G; MG/G
0.5 OINTMENT OPHTHALMIC
Status: CANCELLED | OUTPATIENT
Start: 2021-11-03

## 2021-11-03 RX ORDER — AZITHROMYCIN 250 MG/1
TABLET, FILM COATED ORAL
Qty: 6 TABLET | Refills: 0 | Status: SHIPPED | OUTPATIENT
Start: 2021-11-03 | End: 2021-11-08

## 2021-11-03 RX ORDER — NEOMYCIN SULFATE, POLYMYXIN B SULFATE AND HYDROCORTISONE 10; 3.5; 1 MG/ML; MG/ML; [USP'U]/ML
3 SUSPENSION/ DROPS AURICULAR (OTIC) 4 TIMES DAILY
Qty: 10 ML | Refills: 0 | Status: SHIPPED | OUTPATIENT
Start: 2021-11-03 | End: 2022-04-06

## 2021-11-03 ASSESSMENT — MIFFLIN-ST. JEOR: SCORE: 1251.58

## 2021-11-03 NOTE — TELEPHONE ENCOUNTER
Patient scheduled with Dr. Youssef this morning.    REFUGIO AcevedoN, RN  [facility]

## 2021-11-10 ENCOUNTER — VIRTUAL VISIT (OUTPATIENT)
Dept: BEHAVIORAL HEALTH | Facility: CLINIC | Age: 31
End: 2021-11-10
Payer: COMMERCIAL

## 2021-11-10 ENCOUNTER — VIRTUAL VISIT (OUTPATIENT)
Dept: PSYCHIATRY | Facility: CLINIC | Age: 31
End: 2021-11-10
Payer: COMMERCIAL

## 2021-11-10 DIAGNOSIS — F41.1 GENERALIZED ANXIETY DISORDER WITH PANIC ATTACKS: ICD-10-CM

## 2021-11-10 DIAGNOSIS — F33.1 MODERATE RECURRENT MAJOR DEPRESSION (H): ICD-10-CM

## 2021-11-10 DIAGNOSIS — F41.1 GENERALIZED ANXIETY DISORDER: Primary | ICD-10-CM

## 2021-11-10 DIAGNOSIS — F32.A DEPRESSION, UNSPECIFIED DEPRESSION TYPE: Primary | ICD-10-CM

## 2021-11-10 DIAGNOSIS — F41.0 GENERALIZED ANXIETY DISORDER WITH PANIC ATTACKS: ICD-10-CM

## 2021-11-10 PROCEDURE — 99214 OFFICE O/P EST MOD 30 MIN: CPT | Mod: 95 | Performed by: PSYCHIATRY & NEUROLOGY

## 2021-11-10 PROCEDURE — 99207 PR CDG-MDM COMPONENT: MEETS MODERATE - UP CODED: CPT | Performed by: PSYCHIATRY & NEUROLOGY

## 2021-11-10 PROCEDURE — 90832 PSYTX W PT 30 MINUTES: CPT | Mod: 95 | Performed by: SOCIAL WORKER

## 2021-11-10 RX ORDER — ESCITALOPRAM OXALATE 20 MG/1
20 TABLET ORAL DAILY
Qty: 30 TABLET | Refills: 5 | Status: SHIPPED | OUTPATIENT
Start: 2021-11-10 | End: 2024-01-15

## 2021-11-10 RX ORDER — ZOLPIDEM TARTRATE 5 MG/1
5 TABLET ORAL
Qty: 30 TABLET | Refills: 2 | Status: SHIPPED | OUTPATIENT
Start: 2021-11-10 | End: 2024-09-12

## 2021-11-10 ASSESSMENT — ANXIETY QUESTIONNAIRES
1. FEELING NERVOUS, ANXIOUS, OR ON EDGE: SEVERAL DAYS
5. BEING SO RESTLESS THAT IT IS HARD TO SIT STILL: SEVERAL DAYS
GAD7 TOTAL SCORE: 6
3. WORRYING TOO MUCH ABOUT DIFFERENT THINGS: SEVERAL DAYS
2. NOT BEING ABLE TO STOP OR CONTROL WORRYING: SEVERAL DAYS
IF YOU CHECKED OFF ANY PROBLEMS ON THIS QUESTIONNAIRE, HOW DIFFICULT HAVE THESE PROBLEMS MADE IT FOR YOU TO DO YOUR WORK, TAKE CARE OF THINGS AT HOME, OR GET ALONG WITH OTHER PEOPLE: SOMEWHAT DIFFICULT
7. FEELING AFRAID AS IF SOMETHING AWFUL MIGHT HAPPEN: NOT AT ALL
6. BECOMING EASILY ANNOYED OR IRRITABLE: SEVERAL DAYS

## 2021-11-10 ASSESSMENT — PATIENT HEALTH QUESTIONNAIRE - PHQ9: 5. POOR APPETITE OR OVEREATING: SEVERAL DAYS

## 2021-11-10 NOTE — PATIENT INSTRUCTIONS
Continue escitalopram 20 mg daily.     Continue all other medications per your primary care provider.    Per our conversation, you are graduating from the Collaborative Care Psychiatry program back to the care of your primary care provider.  Please follow up with them in three to six months.  All future refill requests should go to them.    It has been a pleasure working with you, best wishes for the future!    [facility] Resources:      Go to the Emergency Department as needed or call after hours crisis line at [phone].      To schedule individual or family therapy, call [facility] at [phone].     Follow up with primary care provider as planned or sooner for acute medical concerns.    Call the psychiatric nurse line with medication questions or concerns at [phone].    [employer] may be used to communicate with your provider, but this is not intended to be used for emergencies.    Community Resources:      National Suicide Prevention Lifeline: 902.887.3529 (TTY: 324.633.8357). Call anytime for help.  (www.suicidepreventionlifeline.org)    National [place] on Mental Illness (www.[place].org): 777.884.1263 or 178-895-2260.     Mental Health Association (www.mentalhealth.org): 425.716.3954 or 787-392-5539.    [place] Crisis Text [place]: Text [place] to 034632    Suicide LifeLine Chat: suicidepreGradematic.comlifeline.org/chat
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
## 2021-11-10 NOTE — PROGRESS NOTES
"Michelle is a 30 year old who is being evaluated via a billable telephone visit.      What phone number would you like to be contacted at? [phone]  How would you like to obtain your AVS? [facility]        Outpatient Psychiatric Progress Note    Name: Michelle Joshua   : 1990                    Primary Care Provider: Aurora Youssef MD   Therapist: None     PHQ-9 scores:  PHQ-9 SCORE 2021 2021 11/10/2021   PHQ-9 Total Score 9 17 6       ZACHARIAH-7 scores:  ZACHARIAH-7 SCORE 2021 2021 11/10/2021   Total Score 12 12 6       Patient Identification:  Michelle is a 30 year old year old female  who presents for return visit with me.  Patient attended the session alone.     Interim History:  [facility]  Pt shares that she is doing \"much better\" since restarting escitalopram. Noticing that she is worrying less and feeling more calm and that mind doesn't race all the time.  Is taking the Ambien some nights but not all the nights. Is able to sleep though most of the night.  Did get some accommodations for work and PCP helped fill out initial paperwork. Might have an updated form that needs to be completed and she shares that it was felt that Psychiatry might be able to help if additional information is needed. Is on quarantine/leave due to her son being exposed to COVID and returns to work early next week.    Michelle reports that she is doing much better since restarting her Lexapro.  She denies any adverse side effects.  She rates her mood today as 8 out of 10 with 10 being the best.  Her anxiety is at about a 5 out of 10 with 10 being the worst, but she feels that it is manageable.  Her sleep and appetite have improved.  She is at only 1 panic attack in the last month.  Overall, the symptoms she reported at her initial appointment have improved significantly.  She feels that she can continue indefinitely with this level of mood and anxiety.  We agreed that she could be sent back to her " primary care provider for ongoing care.    Vital Signs:  No vital signs taken for this encounter.  LMP 10/19/2021 (Exact Date)   Breastfeeding No       Current Medications:  Current Outpatient Medications   Medication     albuterol (PROAIR HFA/PROVENTIL HFA/VENTOLIN HFA) 108 (90 Base) MCG/ACT inhaler     escitalopram (LEXAPRO) 20 MG tablet     fluticasone (FLOVENT HFA) 220 MCG/ACT inhaler     guaiFENesin-dextromethorphan (ROBITUSSIN DM) 100-10 MG/5ML syrup     loratadine (CLARITIN) 10 MG tablet     neomycin-polymyxin-hydrocortisone (CORTISPORIN) 3.5-96622-9 otic suspension     zolpidem (AMBIEN) 5 MG tablet     HYDROcodone-acetaminophen (NORCO) 5-325 MG tablet     No current facility-administered medications for this visit.        The [place] Prescription Monitoring Program has been reviewed and there are no concerns about diversionary activity for controlled substances at this time.      Mental Status Examination:  Michelle is a 30-year-old woman in no acute distress.  Speech is clear and normal in rate and tone.  Mood is good.  Thoughts are logical and spontaneous with no loose associations or flight of ideas.  Thought content shows no psychosis.  No suicidal thoughts.  She is alert and oriented x3.    Assessment and Plan:    ICD-10-CM    1. Depression, unspecified depression type  F32.A    2. Generalized anxiety disorder with panic attacks  F41.1 escitalopram (LEXAPRO) 20 MG tablet    F41.0 zolpidem (AMBIEN) 5 MG tablet       Medical comorbidities include:   Patient Active Problem List   Diagnosis     Allergic rhinitis     Other acne     CARDIOVASCULAR SCREENING; LDL GOAL LESS THAN 160     Nicotine addiction     Bee sting-induced anaphylaxis     Bunion of left foot     Mild intermittent asthma     Obesity     Hip pain, chronic     Myalgia and myositis     Supervision of other normal pregnancy, antepartum     Need for Tdap vaccination     Encounter for triage in pregnant patient     Group B Streptococcus carrier,  +RV culture, currently pregnant     Pregnancy      (spontaneous vaginal delivery)     Contraception     Shoulder pain     Pain in joint involving ankle and foot, right     Cervical high risk HPV (human papillomavirus) test positive     Current moderate episode of major depressive disorder without prior episode (H)     Anxiety     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     Continue all other medications per your primary care provider.    Per our conversation, you are graduating from the Collaborative Care Psychiatry program back to the care of your primary care provider.  Please follow up with them in three to six months.  All future refill requests should go to them.    It has been a pleasure working with you, best wishes for the future!    [facility] Resources:      Go to the Emergency Department as needed or call after hours crisis line at [phone].      To schedule individual or family therapy, call [facility] at [phone].     Follow up with primary care provider as planned or sooner for acute medical concerns.    Call the psychiatric nurse line with medication questions or concerns at [phone].    [employer] may be used to communicate with your provider, but this is not intended to be used for emergencies.    Community Resources:      National Suicide Prevention Lifeline: 724.480.7855 (TTY: 342.737.7298). Call anytime for help.  (www.suicidepreventionlifeline.org)    National [place] on Mental Illness (www.[place].org): 468.890.7132 or 059-726-5208.     Mental Health Association (www.mentalhealth.org): 634.633.4245 or 604-343-7749.    [place] Crisis Text Line: Text [place] to 673690    Suicide LifeLine Chat: [email or web address] Depositionline.org/chat         Administrative Billing:   Telephone call duration: 11 minutes  Total time spent, including chart review and  documentation: 24 minutes    Patient Status:  The patient is being returned to the referring provider for ongoing care and medication prescribing.  The patient can be referred back to this service for further consultation as needed.

## 2021-11-10 NOTE — Clinical Note
Aurora Carrizales,    I met with Michelle today.  She restarted her Lexapro and reports she is doing well.  I am sending her back to you for ongoing management and prescribing.    Please feel free to contact me with questions or concerns.  Thank you for the opportunity to be involved in the care of this patient.    Regards,  Mari Camara MD  Collaborative Care Psychiatry  [facility]

## 2021-11-11 ASSESSMENT — ANXIETY QUESTIONNAIRES: GAD7 TOTAL SCORE: 6

## 2021-11-11 ASSESSMENT — PATIENT HEALTH QUESTIONNAIRE - PHQ9: SUM OF ALL RESPONSES TO PHQ QUESTIONS 1-9: 6

## 2021-12-01 ENCOUNTER — HOSPITAL ENCOUNTER (EMERGENCY)
Facility: CLINIC | Age: 31
Discharge: LEFT BEFORE DISCHARGE | End: 2021-12-01
Payer: COMMERCIAL

## 2021-12-01 ENCOUNTER — HOSPITAL ENCOUNTER (EMERGENCY)
Facility: CLINIC | Age: 31
Discharge: LEFT BEFORE DISCHARGE | End: 2021-12-01
Attending: EMERGENCY MEDICINE
Payer: COMMERCIAL

## 2021-12-02 NOTE — PROGRESS NOTES
"Michelle is a 30 year old who is being evaluated via a billable video visit.      How would you like to obtain your AVS? {AVS Preference:255076}  If the video visit is dropped, the invitation should be resent by: {video visit invitation:172652}  Will anyone else be joining your video visit? {:225561}  {If patient encounters technical issues they should call [phone] :813813}    Video Start Time: {video visit start/end time for provider to select:621317}    {PROVIDER CHARTING PREFERENCE:696562}    Subjective   Michelle is a 30 year old who presents for the following health issues {ACCOMPANIED BY STATEMENT (Optional):854747}    [facility]     ED/UC Followup:    Facility:  [facility] Emergency Department   Date of visit: 12/01/2021  Reason for visit: Pain on left calf   Current Status: ***       {additonal problems for provider to add (Optional):411493}    Review of Systems   {ROS COMP (Optional):292982}      Objective           Vitals:  No vitals were obtained today due to virtual visit.    Physical Exam   {video visit exam brief selected:157667::\"GENERAL: Healthy, alert and no distress\",\"EYES: Eyes grossly normal to inspection.  No discharge or erythema, or obvious scleral/conjunctival abnormalities.\",\"RESP: No audible wheeze, cough, or visible cyanosis.  No visible retractions or increased work of breathing.  \",\"SKIN: Visible skin clear. No significant rash, abnormal pigmentation or lesions.\",\"NEURO: Cranial nerves grossly intact.  Mentation and speech appropriate for age.\",\"PSYCH: Mentation appears normal, affect normal/bright, judgement and insight intact, normal speech and appearance well-groomed.\"}    {Diagnostic Test Results (Optional):296346}    {AMBULATORY ATTESTATION (Optional):068047}        Video-Visit Details    Type of service:  Video Visit    Video End Time:{video visit start/end time for provider to select:738411}    Originating Location (pt. Location): {video visit patient location:952450::\"Home\"}    Distant " "Location (provider location):  [facility]     Platform used for Video Visit: {Virtual Visit Platforms:105895::\"Evelyn\"}  "

## 2021-12-03 ENCOUNTER — OFFICE VISIT (OUTPATIENT)
Dept: ORTHOPEDICS | Facility: CLINIC | Age: 31
End: 2021-12-03
Payer: COMMERCIAL

## 2021-12-03 ENCOUNTER — VIRTUAL VISIT (OUTPATIENT)
Dept: FAMILY MEDICINE | Facility: CLINIC | Age: 31
End: 2021-12-03
Payer: COMMERCIAL

## 2021-12-03 DIAGNOSIS — M79.662 PAIN OF LEFT CALF: Primary | ICD-10-CM

## 2021-12-03 PROCEDURE — 99203 OFFICE O/P NEW LOW 30 MIN: CPT | Performed by: FAMILY MEDICINE

## 2021-12-03 PROCEDURE — 99213 OFFICE O/P EST LOW 20 MIN: CPT | Mod: 95 | Performed by: PHYSICIAN ASSISTANT

## 2021-12-03 RX ORDER — NAPROXEN 500 MG/1
500 TABLET ORAL 2 TIMES DAILY WITH MEALS
Qty: 20 TABLET | Refills: 0 | Status: SHIPPED | OUTPATIENT
Start: 2021-12-03 | End: 2024-09-12

## 2021-12-03 NOTE — LETTER
12/3/2021      RE: Michelle Joshua  [address] [facility] [address]       CHIEF COMPLAINT:  Pain of the Left Lower Leg       HISTORY OF PRESENT ILLNESS  Ms. Joshua is a pleasant 30 year old year old female who presents to clinic today with L calf pain.      Michelle explains that she started noticing Sx of pain and swelling over last 4 days (11/30/21), worsening over the last 2 days.  Had to go to a few different ED due to lack of US equipment, then the next one was unable due to her having additional child with her.  Then was able to get to the accurate ED and had US negative for DVT.    Mentions that the pain is noticed on the medial L calf.  No DVT hx.    Onset: sudden  Location: left calf/foot  Quality:  Swelling of calf generally resoved. Some numbness/tingling in foot, sensation can range from hot/cold  Duration: 4 days   Severity: 10/10 at worst  Timing:intermittent episodes walking/WB, consistent throbbing sensation  Modifying factors:  resting and non-use makes it better, movement and use makes it worse  Associated signs & symptoms: pain, tenderness and swelling  Previous similar pain: No  Treatments to date:rest, heat, ice    Additional history: as documented    Review of Systems:    Have you recently had a a fever, chills, weight loss? No    Do you have any vision problems? No    Do you have any chest pain or edema? No    Do you have any shortness of breath or wheezing?  No    Do you have stomach problems? No    Do you have any numbness or focal weakness? Yes, L foot    Do you have diabetes? No    Do you have problems with bleeding or clotting? No    Do you have an rashes or other skin lesions? No    MEDICAL HISTORY  Patient Active Problem List   Diagnosis     Allergic rhinitis     Other acne     CARDIOVASCULAR SCREENING; LDL GOAL LESS THAN 160     Nicotine addiction     Bee sting-induced anaphylaxis     Bunion of left foot     Mild intermittent asthma     Obesity     Hip pain, chronic      Myalgia and myositis     Supervision of other normal pregnancy, antepartum     Need for Tdap vaccination     Encounter for triage in pregnant patient     Group B Streptococcus carrier, +RV culture, currently pregnant     Pregnancy      (spontaneous vaginal delivery)     Contraception     Shoulder pain     Pain in joint involving ankle and foot, right     Cervical high risk HPV (human papillomavirus) test positive     Current moderate episode of major depressive disorder without prior episode (H)     Anxiety     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, in the field (EMT or ambulance) (H)     Obtunded       Current Outpatient Medications   Medication Sig Dispense Refill     albuterol (PROAIR HFA/PROVENTIL HFA/VENTOLIN HFA) 108 (90 Base) MCG/ACT inhaler Inhale 2 puffs into the lungs every 6 hours as needed for shortness of breath / dyspnea or wheezing 18 g 4     escitalopram (LEXAPRO) 20 MG tablet Take 1 tablet (20 mg) by mouth daily 30 tablet 5     fluticasone (FLOVENT HFA) 220 MCG/ACT inhaler Inhale 1 puff into the lungs 2 times daily 12 g 3     guaiFENesin-dextromethorphan (ROBITUSSIN DM) 100-10 MG/5ML syrup Take 10 mLs by mouth every 4 hours as needed for cough or congestion 236 mL 0     HYDROcodone-acetaminophen (NORCO) 5-325 MG tablet Take 1-2 tablets by mouth (Patient not taking: Reported on 11/10/2021)       loratadine (CLARITIN) 10 MG tablet Take 1 tablet (10 mg) by mouth daily 90 tablet 3     neomycin-polymyxin-hydrocortisone (CORTISPORIN) 3.5-95895-1 otic suspension Place 3 drops Into the left ear 4 times daily 10 mL 0     zolpidem (AMBIEN) 5 MG tablet Take 1 tablet (5 mg) by mouth nightly as needed for sleep 30 tablet 2       Allergies   Allergen Reactions     Amoxicillin Hives     Augmentin Hives     Bees      Pcn [Penicillins]      Sulfa Drugs Hives     Sulfasalazine        Family History   Problem Relation Age of Onset     Hypertension Mother      Anxiety Disorder Mother      Depression Mother       Depression Sister      Anxiety Disorder Sister      Mental Illness Sister         bipolar       Additional medical/Social/Surgical histories reviewed in [facility] and updated as appropriate.       PHYSICAL EXAM  LMP 11/19/2021 (Exact Date)     General  - normal appearance, in no obvious distress  Musculoskeletal - left lower leg  - stance: Antalgic favoring left lower leg.  - inspection: no swelling or effusion, normal bone and joint alignment, no obvious deformity  - palpation: Tenderness medial gastrocnemius muscle.  - ROM: 135 degrees flexion, -5 degrees extension of knee, not painful, normal actively and passively compared to contralateral at knee.  Ankle with full painless ROM  - strength: 5/5 in flexion, 5/5 in extension at knee.  Ankle plantarflexion elicits medial gastrocnemius tenderness. 4/5 strength.    Neuro  - no sensory or motor deficit, grossly normal coordination, normal muscle tone  Skin  - no ecchymosis, erythema, warmth, or induration, no obvious rash  Psych  - interactive, appropriate, normal mood and affect  CV  -DP and PT pulses intact. Warm well perfused left lower leg.    IMAGING :   Indication: concern for DVT due to calf pain       Comparison: none.     Technique:     1.  Gray-scale imaging of the common femoral, femoral, great saphenous and  popliteal veins in the left lower extremity including compressibility   2.  Color Doppler of the above mentioned deep veins   3.  Doppler waveform analysis of each of these veins     Findings:     The left common femoral vein,  femoral  vein, popliteal vein and great saphenous vein are fully compressible. They demonstrate normal Doppler flow, normal augmentation and normal color-flow. No thrombus is identified within them on grayscale imaging.      PCP note reviewed    ED notes reviewed.     ASSESSMENT & PLAN  Ms. Joshua is a 30 year old year old female who presents to clinic today with left calf pain. DVT study negative in ED. Symptoms today most consistent  with calf strain given localization of pain at gastrocnemius muscle.  Less likely lumbar radicular symptoms, no lumbar hx.      Diagnosis: Pain of left calf.    -Start tall CAM boot  -WBAT in boot  -Ice, elevation  -Naproxen BID x 10 days ordered  -Calf strain HEP provided and demonstrated by ATC  -Follow up 14 days if no improvement, red flags reviewed for urgent follow up    It was a pleasure seeing Michelle today.    Dickson Tian DO, CAQSM  Primary Care Sports Medicine

## 2021-12-03 NOTE — PROGRESS NOTES
CHIEF COMPLAINT:  Pain of the Left Lower Leg       HISTORY OF PRESENT ILLNESS  Ms. Joshua is a pleasant 30 year old year old female who presents to clinic today with L calf pain.      Michelle explains that she started noticing Sx of pain and swelling over last 4 days (11/30/21), worsening over the last 2 days.  Had to go to a few different ED due to lack of US equipment, then the next one was unable due to her having additional child with her.  Then was able to get to the accurate ED and had US negative for DVT.    Mentions that the pain is noticed on the medial L calf.  No DVT hx.    Onset: sudden  Location: left calf/foot  Quality:  Swelling of calf generally resoved. Some numbness/tingling in foot, sensation can range from hot/cold  Duration: 4 days   Severity: 10/10 at worst  Timing:intermittent episodes walking/WB, consistent throbbing sensation  Modifying factors:  resting and non-use makes it better, movement and use makes it worse  Associated signs & symptoms: pain, tenderness and swelling  Previous similar pain: No  Treatments to date:rest, heat, ice    Additional history: as documented    Review of Systems:    Have you recently had a a fever, chills, weight loss? No    Do you have any vision problems? No    Do you have any chest pain or edema? No    Do you have any shortness of breath or wheezing?  No    Do you have stomach problems? No    Do you have any numbness or focal weakness? Yes, L foot    Do you have diabetes? No    Do you have problems with bleeding or clotting? No    Do you have an rashes or other skin lesions? No    MEDICAL HISTORY  Patient Active Problem List   Diagnosis     Allergic rhinitis     Other acne     CARDIOVASCULAR SCREENING; LDL GOAL LESS THAN 160     Nicotine addiction     Bee sting-induced anaphylaxis     Bunion of left foot     Mild intermittent asthma     Obesity     Hip pain, chronic     Myalgia and myositis     Supervision of other normal pregnancy, antepartum     Need for  Tdap vaccination     Encounter for triage in pregnant patient     Group B Streptococcus carrier, +RV culture, currently pregnant     Pregnancy      (spontaneous vaginal delivery)     Contraception     Shoulder pain     Pain in joint involving ankle and foot, right     Cervical high risk HPV (human papillomavirus) test positive     Current moderate episode of major depressive disorder without prior episode (H)     Anxiety     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, in the field (EMT or ambulance) (H)     Obtunded       Current Outpatient Medications   Medication Sig Dispense Refill     albuterol (PROAIR HFA/PROVENTIL HFA/VENTOLIN HFA) 108 (90 Base) MCG/ACT inhaler Inhale 2 puffs into the lungs every 6 hours as needed for shortness of breath / dyspnea or wheezing 18 g 4     escitalopram (LEXAPRO) 20 MG tablet Take 1 tablet (20 mg) by mouth daily 30 tablet 5     fluticasone (FLOVENT HFA) 220 MCG/ACT inhaler Inhale 1 puff into the lungs 2 times daily 12 g 3     guaiFENesin-dextromethorphan (ROBITUSSIN DM) 100-10 MG/5ML syrup Take 10 mLs by mouth every 4 hours as needed for cough or congestion 236 mL 0     HYDROcodone-acetaminophen (NORCO) 5-325 MG tablet Take 1-2 tablets by mouth (Patient not taking: Reported on 11/10/2021)       loratadine (CLARITIN) 10 MG tablet Take 1 tablet (10 mg) by mouth daily 90 tablet 3     neomycin-polymyxin-hydrocortisone (CORTISPORIN) 3.5-80608-9 otic suspension Place 3 drops Into the left ear 4 times daily 10 mL 0     zolpidem (AMBIEN) 5 MG tablet Take 1 tablet (5 mg) by mouth nightly as needed for sleep 30 tablet 2       Allergies   Allergen Reactions     Amoxicillin Hives     Augmentin Hives     Bees      Pcn [Penicillins]      Sulfa Drugs Hives     Sulfasalazine        Family History   Problem Relation Age of Onset     Hypertension Mother      Anxiety Disorder Mother      Depression Mother      Depression Sister      Anxiety Disorder Sister      Mental Illness Sister         bipolar        Additional medical/Social/Surgical histories reviewed in [facility] and updated as appropriate.       PHYSICAL EXAM  LMP 11/19/2021 (Exact Date)     General  - normal appearance, in no obvious distress  Musculoskeletal - left lower leg  - stance: Antalgic favoring left lower leg.  - inspection: no swelling or effusion, normal bone and joint alignment, no obvious deformity  - palpation: Tenderness medial gastrocnemius muscle.  - ROM: 135 degrees flexion, -5 degrees extension of knee, not painful, normal actively and passively compared to contralateral at knee.  Ankle with full painless ROM  - strength: 5/5 in flexion, 5/5 in extension at knee.  Ankle plantarflexion elicits medial gastrocnemius tenderness. 4/5 strength.    Neuro  - no sensory or motor deficit, grossly normal coordination, normal muscle tone  Skin  - no ecchymosis, erythema, warmth, or induration, no obvious rash  Psych  - interactive, appropriate, normal mood and affect  CV  -DP and PT pulses intact. Warm well perfused left lower leg.    IMAGING :   Indication: concern for DVT due to calf pain       Comparison: none.     Technique:     1.  Gray-scale imaging of the common femoral, femoral, great saphenous and  popliteal veins in the left lower extremity including compressibility   2.  Color Doppler of the above mentioned deep veins   3.  Doppler waveform analysis of each of these veins     Findings:     The left common femoral vein,  femoral  vein, popliteal vein and great saphenous vein are fully compressible. They demonstrate normal Doppler flow, normal augmentation and normal color-flow. No thrombus is identified within them on grayscale imaging.      PCP note reviewed    ED notes reviewed.     ASSESSMENT & PLAN  Ms. Joshua is a 30 year old year old female who presents to clinic today with left calf pain. DVT study negative in ED. Symptoms today most consistent with calf strain given localization of pain at gastrocnemius muscle.  Less likely lumbar  radicular symptoms, no lumbar hx.      Diagnosis: Pain of left calf.    -Start tall CAM boot  -WBAT in boot  -Ice, elevation  -Naproxen BID x 10 days ordered  -Calf strain HEP provided and demonstrated by ATC  -Follow up 14 days if no improvement, red flags reviewed for urgent follow up    It was a pleasure seeing Michelle today.    Dickson Tian DO, CAQSM  Primary Care Sports Medicine

## 2021-12-03 NOTE — PROGRESS NOTES
Michelle is a 30 year old who is being evaluated via a billable video visit.      How would you like to obtain your AVS? MyChart  If the video visit is dropped, the invitation should be resent by: Text to cell phone: [phone]  Will anyone else be joining your video visit? No    Video Start Time: 10:03 AM    Assessment & Plan     Pain of left calf  With the amount of pain and swelling in that leg, this does need further evaluation.  Since negative for blood clot, I do think ortho walk in clinic will be a good place to get evaluated.  She does agree and will go there.               Tobacco Cessation:   reports that she has been smoking cigarettes. She has been smoking about 0.50 packs per day. She has never used smokeless tobacco.          No follow-ups on file.    Bimal Pearl PA-C  [facility]   Michelle is a 30 year old who presents for the following health issues    HPI     ED/UC Followup:    Facility:  [facility]  Date of visit: 12/1/21  Reason for visit: left calf pain  Current Status: no improvement in pain - unable to bend leg, muscle spasms/pulsing in calf, toes are numb, tingling arch     Started about 4 days ago.  Ended up in ER, some concern for DEEP VEIN THROMBOSIS.  Did have US, no sign of blood clot.      Taking aleve.  Calf still swollen and about 3 times the size of the other.        Review of Systems   Constitutional, HEENT, cardiovascular, pulmonary, gi and gu systems are negative, except as otherwise noted.      Objective           Vitals:  No vitals were obtained today due to virtual visit.    Physical Exam   No exam, telephone visit              Had to switch to telephone, patient unable to connect video  Time of visit 9 minutes.

## 2021-12-18 ENCOUNTER — HEALTH MAINTENANCE LETTER (OUTPATIENT)
Age: 31
End: 2021-12-18

## 2022-01-05 NOTE — PROGRESS NOTES
Michelle is a 31 year old who is being evaluated via a billable telephone visit.      What phone number would you like to be contacted at?   How would you like to obtain your AVS? MyChart    Assessment & Plan     Cough  Acute sinusitis  I recommended she be tested for COVID-19 and influenza.  She describes having symptoms consistent with acute sinusitis and possibly bronchitis.  She has a history of asthma which increases her risk of having more significant respiratory infection.  She was given a prescription for Z-Valente and I refilled Flovent and encouraged her to start taking this regularly.  She has albuterol available when needed.  It is reassuring that her oxygen saturation is 100% on room air and she is not in acute respiratory distress.  If this develops she will seek medical attention right away.  - Symptomatic; Unknown COVID-19 Virus (Coronavirus) by PCR Nasopharyngeal; Future  - Influenza A/B antigen  - Symptomatic; Unknown COVID-19 Virus (Coronavirus) by PCR Nose  - azithromycin (ZITHROMAX) 250 MG tablet; Take 2 tablets (500 mg) by mouth daily for 1 day, THEN 1 tablet (250 mg) daily for 4 days.    Sore throat  We are going to check a strep test   - Streptococcus A Rapid Scr w Reflx to PCR - Lab Collect; Future  - Streptococcus A Rapid Scr w Reflx to PCR - Lab Collect  - Group A Streptococcus PCR Throat Swab    Nausea and vomiting, intractability of vomiting not specified, unspecified vomiting type  I encouraged hydration.    Feels feverish  She may take Tylenol or ibuprofen as needed    Mild intermittent asthma without complication  I recommended she restart Flovent and she has albuterol available if needed.  - fluticasone (FLOVENT HFA) 220 MCG/ACT inhaler; Inhale 1 puff into the lungs 2 times daily             Tobacco Cessation:   reports that she has been smoking cigarettes. She has been smoking about 0.50 packs per day. She has never used smokeless tobacco.    She would benefit from smoking  cessation.        Return in about 2 weeks (around 1/20/2022) for Follow up if symptoms not improving..    Dickson Diaz [facility]    Mechelle Martinez is a 31 year old who presents for the following health issues     HPI       Concern for COVID-19  About how many days ago did these symptoms start? Friday   Is this your first visit for this illness? No  In the 14 days before your symptoms started, have you had close contact with someone with COVID-19 (Coronavirus)? I do not know.  Do you have a fever or chills? Yes, I felt feverish or had chills  Are you having new or worsening difficulty breathing? Yes   Please describe what kind of difficulty you are having breathing:Mild dyspnea (able to do ADLs without difficulty, mild shortness of breath with activities such as climbing one or two flights of stairs or walking briskly)  Do you have new or worsening cough? Yes, I am coughing up mucus.  Have you had any new or unexplained body aches? YES    Have you experienced any of the following NEW symptoms?    Headache: YES    Sore throat: YES    Loss of taste or smell: No    Chest pain: YES    Diarrhea: YES    Rash: No  What treatments have you tried? albuterol  Who do you live with? Family  Are you, or a household member, a healthcare worker or a ? No  Do you live in a nursing home, group home, or shelter? No  Do you have a way to get food/medications if quarantined? Yes, I have a friend or family member who can help me.            She has a history of asthma, but has not been taking the Flovent.  She has been using albuterol more regularly which seems to help.    Review of Systems   HENT: Positive for ear pain, rhinorrhea and sore throat.    Respiratory: Positive for cough.    Gastrointestinal: Positive for diarrhea and vomiting.   Musculoskeletal: Positive for neck pain.   Neurological: Positive for headaches.            Objective           Vitals:  No vitals were  obtained today due to virtual visit.    Physical Exam   alert, no distress and fatigued  PSYCH: Alert and oriented times 3; coherent speech, normal   rate and volume, able to articulate logical thoughts, able   to abstract reason, no tangential thoughts, no hallucinations   or delusions  Her affect is normal  RESP: No cough, no audible wheezing, able to talk in full sentences  Remainder of exam unable to be completed due to telephone visits                Phone call duration: 7 minutes

## 2022-01-06 ENCOUNTER — VIRTUAL VISIT (OUTPATIENT)
Dept: FAMILY MEDICINE | Facility: CLINIC | Age: 32
End: 2022-01-06
Payer: COMMERCIAL

## 2022-01-06 VITALS
DIASTOLIC BLOOD PRESSURE: 86 MMHG | SYSTOLIC BLOOD PRESSURE: 143 MMHG | HEART RATE: 107 BPM | OXYGEN SATURATION: 100 % | RESPIRATION RATE: 17 BRPM | TEMPERATURE: 97.9 F

## 2022-01-06 DIAGNOSIS — J01.90 ACUTE NON-RECURRENT SINUSITIS, UNSPECIFIED LOCATION: ICD-10-CM

## 2022-01-06 DIAGNOSIS — J45.20 MILD INTERMITTENT ASTHMA WITHOUT COMPLICATION: ICD-10-CM

## 2022-01-06 DIAGNOSIS — J02.9 SORE THROAT: ICD-10-CM

## 2022-01-06 DIAGNOSIS — R05.9 COUGH: Primary | ICD-10-CM

## 2022-01-06 DIAGNOSIS — R11.2 NAUSEA AND VOMITING, INTRACTABILITY OF VOMITING NOT SPECIFIED, UNSPECIFIED VOMITING TYPE: ICD-10-CM

## 2022-01-06 DIAGNOSIS — R50.9 FEELS FEVERISH: ICD-10-CM

## 2022-01-06 LAB
DEPRECATED S PYO AG THROAT QL EIA: NEGATIVE
FLUAV AG SPEC QL IA: NEGATIVE
FLUBV AG SPEC QL IA: NEGATIVE
GROUP A STREP BY PCR: NOT DETECTED
SARS-COV-2 RNA RESP QL NAA+PROBE: NEGATIVE

## 2022-01-06 PROCEDURE — 87651 STREP A DNA AMP PROBE: CPT | Performed by: FAMILY MEDICINE

## 2022-01-06 PROCEDURE — 87804 INFLUENZA ASSAY W/OPTIC: CPT | Performed by: FAMILY MEDICINE

## 2022-01-06 PROCEDURE — U0005 INFEC AGEN DETEC AMPLI PROBE: HCPCS | Performed by: FAMILY MEDICINE

## 2022-01-06 PROCEDURE — U0003 INFECTIOUS AGENT DETECTION BY NUCLEIC ACID (DNA OR RNA); SEVERE ACUTE RESPIRATORY SYNDROME CORONAVIRUS 2 (SARS-COV-2) (CORONAVIRUS DISEASE [COVID-19]), AMPLIFIED PROBE TECHNIQUE, MAKING USE OF HIGH THROUGHPUT TECHNOLOGIES AS DESCRIBED BY CMS-2020-01-R: HCPCS | Performed by: FAMILY MEDICINE

## 2022-01-06 PROCEDURE — 99213 OFFICE O/P EST LOW 20 MIN: CPT | Mod: 95 | Performed by: FAMILY MEDICINE

## 2022-01-06 RX ORDER — AZITHROMYCIN 250 MG/1
TABLET, FILM COATED ORAL
Qty: 6 TABLET | Refills: 0 | Status: SHIPPED | OUTPATIENT
Start: 2022-01-06 | End: 2022-01-11

## 2022-01-06 RX ORDER — FLUTICASONE PROPIONATE 220 UG/1
1 AEROSOL, METERED RESPIRATORY (INHALATION) 2 TIMES DAILY
Qty: 12 G | Refills: 3 | Status: SHIPPED | OUTPATIENT
Start: 2022-01-06 | End: 2022-03-31

## 2022-01-06 ASSESSMENT — ENCOUNTER SYMPTOMS
VOMITING: 1
DIARRHEA: 1
NECK PAIN: 1
RHINORRHEA: 1
SORE THROAT: 1
COUGH: 1
HEADACHES: 1

## 2022-01-07 ENCOUNTER — TELEPHONE (OUTPATIENT)
Dept: FAMILY MEDICINE | Facility: CLINIC | Age: 32
End: 2022-01-07
Payer: COMMERCIAL

## 2022-01-07 NOTE — TELEPHONE ENCOUNTER
Patient wants to know next steps   Labs all negative  Pt hasn't picked up abx until today   Will start taking it - told pt to callback if not feeling somewhat better by Monday   Pt agrees with plan  Yolanda SANDOVAL RN

## 2022-03-31 ENCOUNTER — VIRTUAL VISIT (OUTPATIENT)
Dept: FAMILY MEDICINE | Facility: CLINIC | Age: 32
End: 2022-03-31
Payer: COMMERCIAL

## 2022-03-31 DIAGNOSIS — J45.31 MILD PERSISTENT ASTHMA WITH ACUTE EXACERBATION: ICD-10-CM

## 2022-03-31 DIAGNOSIS — F32.1 CURRENT MODERATE EPISODE OF MAJOR DEPRESSIVE DISORDER WITHOUT PRIOR EPISODE (H): ICD-10-CM

## 2022-03-31 DIAGNOSIS — I77.6 AORTITIS (H): Primary | ICD-10-CM

## 2022-03-31 PROCEDURE — 99215 OFFICE O/P EST HI 40 MIN: CPT | Performed by: FAMILY MEDICINE

## 2022-03-31 RX ORDER — ALBUTEROL SULFATE 90 UG/1
2 AEROSOL, METERED RESPIRATORY (INHALATION) EVERY 6 HOURS PRN
Qty: 18 G | Refills: 4 | Status: SHIPPED | OUTPATIENT
Start: 2022-03-31 | End: 2024-09-12

## 2022-03-31 RX ORDER — FLUTICASONE PROPIONATE 220 UG/1
1 AEROSOL, METERED RESPIRATORY (INHALATION) 2 TIMES DAILY
Qty: 12 G | Refills: 3 | Status: SHIPPED | OUTPATIENT
Start: 2022-03-31 | End: 2024-09-12

## 2022-03-31 RX ORDER — OXYCODONE HYDROCHLORIDE 5 MG/1
5 TABLET ORAL DAILY PRN
Qty: 12 TABLET | Refills: 0 | Status: SHIPPED | OUTPATIENT
Start: 2022-03-31 | End: 2022-04-06

## 2022-03-31 NOTE — PATIENT INSTRUCTIONS
Aortitis (H)  Plan: advised to follow through with cardiologist and keep taking the prednisone as recommended by the vascular specialist.  As per request limited tabs of opoid given to be used only as needed  And not intended for long term use  - oxyCODONE (ROXICODONE) 5 MG tablet; Take 1 tablet (5 mg) by mouth daily as needed for pain    Asthma- uncontrolled  Refill for inhaler given    Please avoid smoking as that aggravates asthma and inflammation  Be seen in emergency department if further chest pain related concerns

## 2022-03-31 NOTE — PROGRESS NOTES
Michelle is a 31 year old who is being evaluated via a billable video visit.      How would you like to obtain your AVS? MyChart  If the video visit is dropped, the invitation should be resent by: Text to cell phone: [phone]  Will anyone else be joining your video visit? No    Video Start Time: 3:27 PM    Assessment & Plan   31 year old female seen on video for on going chest pain.  She is seen smoking at the time of the visit.  unfortunately she was not able to hear me- so part of the appointment was completed by calling her.  Aortitis (H)  Plan: advised to follow through with cardiologist and keep taking the prednisone as recommended by the   - oxyCODONE (ROXICODONE) 5 MG tablet; Take 1 tablet (5 mg) by mouth daily as needed for pain  She reports she is allergic to tylenol and naproxen causes heart burn.  - reviewed : 03/31/22 No other significant past medical history or family history. Concerns    Mild persistent asthma with acute exacerbation  Uncontrolled   Refill on inhaler given  encouraged to avoid smoking.  - albuterol (PROAIR HFA/PROVENTIL HFA/VENTOLIN HFA) 108 (90 Base) MCG/ACT inhaler; Inhale 2 puffs into the lungs every 6 hours as needed for shortness of breath / dyspnea or wheezing  - fluticasone (FLOVENT HFA) 220 MCG/ACT inhaler; Inhale 1 puff into the lungs 2 times daily    Current moderate episode of major depressive disorder without prior episode (H)  - considered this problem when making today's plans  - no interventions today       -followed by specialist.      Prescription drug management  55 minutes spent on the date of the encounter doing chart review, history and exam, documentation and further activities per the note       Tobacco Cessation:   reports that she has been smoking cigarettes. She has been smoking about 0.50 packs per day. She has never used smokeless tobacco.  Tobacco Cessation Action Plan: Information offered: Patient not interested at this time    BMI:   Estimated body  "mass index is 26.17 kg/m  as calculated from the following:    Height as of 11/3/21: 1.529 m (5' 0.2\").    Weight as of 11/3/21: 61.2 kg (134 lb 14.4 oz).           Return in about 2 weeks (around 4/14/2022) for follow up with PCP.    Shantelle Chapa MD  [facility]    Mechelle Martinez is a 31 year old who presents for the following health issues     HPI     Hospital Follow-up Visit:    Hospital/Nursing Home/ Rehab Facility: [facility]  Date of Admission: 3/26/2022  Date of Discharge: 3/27/2022  Reason(s) for Admission: Chest Pain      Was your hospitalization related to COVID-19? No   Problems taking medications regularly:  None  Medication changes since discharge: None  Problems adhering to non-medication therapy:  None    Summary of hospitalization:  CareEverywhere information obtained and reviewed  Diagnostic Tests/Treatments reviewed.  Follow up needed: cardiologist  Other Healthcare Providers Involved in Patient s Care:         None  Update since discharge: fluctuating course. Post Discharge Medication Reconciliation: discharge medications reconciled and changed, per note/orders.  Plan of care communicated with patient   she was seen in [facility] emergency department on 3/26- with 3 wk history of pluretic type chest pain and found to have elevated ESR/CRP & CT scan showed wall thickening of aortic arch, descedning and abdominal aortic arch- concerning for aortitis.  She has been on high dose steroid and reports she was told to get narcotic from  Her primary care physicain.  She missed an OV appointment with primary care physicain this morning and is extremely annoyed because she believes no one call her for video appointment.  While our team and myself tried multiple times to reach her on her primary number and it was going straight to  and we were unable to leave a voice message      Asthma Follow-Up  Needs refill on asthma inhaler  Not using it regularly   Was ACT completed today? "  No      Do you have a cough?  YES    Are you experiencing any wheezing in your chest?  YES    Do you have any shortness of breath?  No     How often are you using a short-acting (rescue) inhaler or nebulizer, such as Albuterol?  A few times a week    How many days per week do you miss taking your asthma controller medication?  I do not have an asthma controller medication    Please describe any recent triggers for your asthma: smoke    Have you had any Emergency Room Visits, Urgent Care Visits, or Hospital Admissions since your last office visit?  No      How many servings of fruits and vegetables do you eat daily?  0-1    On average, how many sweetened beverages do you drink each day (Examples: soda, juice, sweet tea, etc.  Do NOT count diet or artificially sweetened beverages)?   0    How many days per week do you exercise enough to make your heart beat faster? 7    How many minutes a day do you exercise enough to make your heart beat faster? 9 or less    How many days per week do you miss taking your medication? 0        Review of Systems   Constitutional, HEENT, cardiovascular, pulmonary, GI, , musculoskeletal, neuro, skin, endocrine and psych systems are negative, except as otherwise noted.      Objective           Vitals:  No vitals were obtained today due to virtual visit.    Physical Exam smoking & crusing a few times as she was not able to hear me   GENERAL: Healthy, alert and no distress  EYES: Eyes grossly normal to inspection.  No discharge or erythema, or obvious scleral/conjunctival abnormalities.  RESP: No audible wheeze, cough, or visible cyanosis.  No visible retractions or increased work of breathing.    SKIN: Visible skin clear. No significant rash, abnormal pigmentation or lesions.  NEURO: Cranial nerves grossly intact.  Mentation and speech appropriate for age.  PSYCH: Mentation appears normal, affect normal/bright, judgement and insight intact, normal speech and appearance  well-groomed.                Video-Visit Details    Type of service:  Video Visit    Video End Time:3:39 PM    Originating Location (pt. Location): Home    Distant Location (provider location):  [facility]     Platform used for Video Visit: [employer]

## 2022-04-04 NOTE — PROGRESS NOTES
"  Assessment & Plan     Aortitis (H)  Seeing a specialist / vascular surgeon soon, seems that the prednisone is helping with the pain , occasionally takes oxycodone but not as much as first few days   - REVIEW OF HEALTH MAINTENANCE PROTOCOL ORDERS  - oxyCODONE (ROXICODONE) 5 MG tablet; Take 1 tablet (5 mg) by mouth daily as needed for pain    Other chest pain  As a result of the inflammatory aortitis , she is seeing vascular surgeon , has a f/u appointment                    BMI:   Estimated body mass index is 25.73 kg/m  as calculated from the following:    Height as of this encounter: 1.549 m (5' 1\").    Weight as of this encounter: 61.8 kg (136 lb 3.2 oz).     RTC if no improving or worsening.  Pt is aware  and comfortable with the current plan.      Aurora Youssef MD  [facility]    Mechelle Martinez is a 31 year old who presents for the following health issues     History of Present Illness       Vascular Disease:  She presents for follow up of vascular disease.  She never takes nitroglycerin. She is not taking daily aspirin.    She eats 4 or more servings of fruits and vegetables daily.She consumes 2 sweetened beverage(s) daily.She exercises with enough effort to increase her heart rate 10 to 19 minutes per day.  She exercises with enough effort to increase her heart rate 4 days per week. She is missing 1 dose(s) of medications per week.  She is not taking prescribed medications regularly due to remembering to take.       Chest Pain  Onset/Duration: seen at [facility] 3/26/2022-3/27/2022, had virtual visit hospital followup on 3/31/2022  She has been on high doses of prednisone for a few weeks , now down to 40 mg daily and will taper down and off , soon a f/u visit with vascular surgeon, they are doing a work up to see the reason / diagnosis for the aortitis , most likely inflammatory in nature , RF was elevated   Pain is less now , she has used oxycodone occasionally  as she is allergic " to tylenol and cannot take NSAIDS ,   Description: chest pain   Location: mid of left side   Character: sharp, burning, tight and ripping  Radiation: left chest   Duration: constant   Intensity: mild  Progression of Symptoms: improving  Accompanying Signs & Symptoms:  Shortness of breath: YES  Sweating: YES  Nausea/vomiting: no  Lightheadedness: YES  Palpitations: no  Fever/Chills: no  Cough: YES           Heartburn: no  History:   Family history of heart disease: YES  Tobacco use: YES  Previous similar symptoms: YES  Precipitating factors:   Worse with exertion: no  Worse with deep breaths: YES           Related to eating: no           Better with burping: no  Alleviating factors: mild  Therapies tried and outcome: none  RF was elevated shoulders wrists , neck pain, joints pain   Dr Pritchett     Review of Systems   Constitutional, HEENT, cardiovascular, pulmonary, GI, , musculoskeletal, neuro, skin, endocrine and psych systems are negative, except as otherwise noted.      Objective    There were no vitals taken for this visit.  There is no height or weight on file to calculate BMI.  Physical Exam   GENERAL: healthy, alert and no distress  EYES: Eyes grossly normal to inspection, PERRL and conjunctivae and sclerae normal  NECK: no adenopathy, no asymmetry, masses, or scars and thyroid normal to palpation  RESP: lungs clear to auscultation - no rales, rhonchi or wheezes  CV: regular rate and rhythm, normal S1 S2, no S3 or S4, no murmur, click or rub, no peripheral edema and peripheral pulses strong  ABDOMEN: soft, nontender, no hepatosplenomegaly, no masses and bowel sounds normal  MS: no gross musculoskeletal defects noted, no edema    No results found for any visits on 04/06/22.

## 2022-04-06 ENCOUNTER — OFFICE VISIT (OUTPATIENT)
Dept: FAMILY MEDICINE | Facility: CLINIC | Age: 32
End: 2022-04-06
Payer: COMMERCIAL

## 2022-04-06 VITALS
DIASTOLIC BLOOD PRESSURE: 80 MMHG | HEART RATE: 104 BPM | HEIGHT: 61 IN | WEIGHT: 136.2 LBS | SYSTOLIC BLOOD PRESSURE: 138 MMHG | OXYGEN SATURATION: 100 % | TEMPERATURE: 98.2 F | BODY MASS INDEX: 25.71 KG/M2 | RESPIRATION RATE: 18 BRPM

## 2022-04-06 DIAGNOSIS — R07.89 OTHER CHEST PAIN: ICD-10-CM

## 2022-04-06 DIAGNOSIS — I77.6 AORTITIS (H): Primary | ICD-10-CM

## 2022-04-06 DIAGNOSIS — Z79.899 ENCOUNTER FOR LONG-TERM (CURRENT) USE OF MEDICATIONS: ICD-10-CM

## 2022-04-06 PROCEDURE — 99214 OFFICE O/P EST MOD 30 MIN: CPT | Performed by: FAMILY MEDICINE

## 2022-04-06 RX ORDER — TRAMADOL HYDROCHLORIDE 50 MG/1
50 TABLET ORAL EVERY 6 HOURS PRN
Qty: 10 TABLET | Refills: 0 | Status: CANCELLED | OUTPATIENT
Start: 2022-04-06 | End: 2022-04-09

## 2022-04-06 RX ORDER — TRAMADOL HYDROCHLORIDE 50 MG/1
50 TABLET ORAL ONCE
Status: CANCELLED | OUTPATIENT
Start: 2022-04-06 | End: 2022-04-06

## 2022-04-06 RX ORDER — OXYCODONE HYDROCHLORIDE 5 MG/1
5 TABLET ORAL DAILY PRN
Qty: 12 TABLET | Refills: 0 | Status: CANCELLED | OUTPATIENT
Start: 2022-04-06

## 2022-04-07 RX ORDER — OXYCODONE HYDROCHLORIDE 5 MG/1
5 TABLET ORAL DAILY PRN
Qty: 12 TABLET | Refills: 0 | Status: SHIPPED | OUTPATIENT
Start: 2022-04-07 | End: 2022-11-07

## 2022-06-04 ENCOUNTER — HEALTH MAINTENANCE LETTER (OUTPATIENT)
Age: 32
End: 2022-06-04

## 2022-07-26 NOTE — PATIENT INSTRUCTIONS
WHAT IS A CALF STRAIN?      A calf strain is a stretch or tear of a muscle or tendon in the back of your leg below your knee. This area of your leg is called the calf. Tendons are strong bands of tissue that attach muscle to bone.    This type of injury is often called a pulled muscle.    WHAT IS THE CAUSE?    You can strain your calf muscle when you do an activity that involves pushing off forcefully from your toes. For example, it may happen when you are running, jumping, or lunging.    WHAT ARE THE SYMPTOMS?    Symptoms may include:    A snapping or popping sound at the time of the injury  Pain or burning in the back of your lower leg  A feeling that someone has hit you in the back of the leg  Trouble rising up on your toes  Swelling and bruising    HOW IS IT DIAGNOSED?    Your healthcare provider will ask about your symptoms, activities, and medical history and examine your leg.    HOW IS IT TREATED?    You will need to change or stop doing the activities that cause pain until your muscle or tendon has healed. For example, you may need to swim instead of run. You may need to use crutches if it s too painful to walk.    Your healthcare provider may recommend stretching and strengthening exercises. Your provider may refer you to physical therapy.    Your healthcare provider or physical therapist may tape the injured muscle while it heals to help you return to athletic activities. Taping helps reduce movement that may cause more muscle damage. They may recommend wearing an elastic or neoprene sleeve around your calf.    A mild strain may heal within a few weeks. A more severe strain may take 6 weeks or longer to heal.    HOW CAN I TAKE CARE OF MYSELF?    To help relieve swelling and pain:    Put an ice pack, gel pack, or package of frozen vegetables wrapped in a cloth on the sore area every 3 to 4 hours for up to 20 minutes at a time.  Do ice massage. To do this, freeze water in a Styrofoam cup, then peel the top  of the cup away to expose the ice. Hold the bottom of the cup and rub the ice over the painful area for 5 to 10 minutes. Do this several times a day while you have pain.  Keep your leg up on pillows when you sit or lie down.  Take nonprescription pain medicine, such as acetaminophen, ibuprofen, or naproxen. Read the label and take as directed. Nonsteroidal anti-inflammatory medicines (NSAIDs), such as ibuprofen or naproxen, may cause stomach bleeding and other problems. These risks increase with age. Unless recommended by your healthcare provider, do not take an NSAID for more than 10 days.  After you recover from your injury, moist heat may help relax your muscles and make it easier to use them. Put moist heat on your calf for 10 to 15 minutes before you do warm-up and stretching exercises. Moist heat includes heat patches or moist heating pads that you can buy at most [employer], a wet washcloth or towel that has been heated in a microwave or the dryer, or a hot shower. Don t use heat if you have swelling.    Use an elastic bandage when you return to your activities as directed by your provider.    Follow your healthcare provider's instructions, including any exercises recommended by your provider. Ask your provider:    How long it will take to recover  What activities you should avoid and when you can return to your normal activities  How to take care of yourself at home  What symptoms or problems you should watch for and what to do if you have them  Make sure you know when you should come back for a checkup.    HOW CAN I HELP PREVENT A CALF STRAIN?    Warm-up exercises and stretching before activities can help prevent injuries. This is especially important if you are doing jumping or sprinting sports.    Developed by [employer].  Published by [employer].  Copyright  2014 [employer] and/or one of its subsidiaries. All rights reserved.      CALF STRAIN EXERCISES   You can start gently stretching your  calf muscle with the towel stretch right away. Make sure you feel only a gentle pull and not a sharp pain in your calf while you are doing the stretch.    Wearing a quarter to half-inch heel lift in each shoe might reduce your pain as you start to recover from your injury. You can purchase heel lifts at most pharmacies. You can stop using the heel lift when you have no pain while walking.    Towel stretch: Sit on a hard surface with your injured leg stretched out in front of you. Loop a towel around your toes and the ball of your foot and pull the towel toward your body keeping your leg straight. Hold this position for 15 to 30 seconds and then relax. Repeat 3 times.  After you can do the towel stretch easily, you can start the standing calf stretch.    Standing calf stretch: Stand facing a wall with your hands on the wall at about eye level. Keep your injured leg back with your heel on the floor. Keep the other leg forward with the knee bent. Turn your back foot slightly inward (as if you were pigeon-toed). Slowly lean into the wall until you feel a stretch in the back of your calf. Hold the stretch for 15 to 30 seconds. Return to the starting position. Repeat 3 times. Do this exercise several times each day.    After a couple days of stretching (pain free walking and pain free stretching), you can begin strengthening your calf and lower leg muscles using elastic tubing as described in the next exercise.    Resisted ankle plantar flexion: Sit with your injured leg stretched out in front of you. Loop the tubing around the ball of your foot. Hold the ends of the tubing with both hands. Gently press the ball of your foot down and point your toes, stretching the tubing. Return to the starting position. Do 2 sets of 15.  You may do the last 4 exercises when you can stand on your toes without pain.    Heel raise: Stand behind a chair or counter with both feet flat on the floor. Using the chair or counter as a support,  "rise up onto your toes and hold for 5 seconds. Then slowly lower yourself down without holding onto the support. (It's OK to keep holding onto the support if you need to.) When this exercise becomes less painful, try doing this exercise while you are standing on the injured leg only. Repeat 15 times. Do 2 sets of 15. Rest 30 seconds between sets.  You can challenge yourself by standing on just your injured leg as you do this exercise.    Single leg balance: Stand without any support and try to balance on your injured leg. Keep standing on the one leg for 30 seconds. Repeat 3 times. Begin with your eyes open and then try to do the exercise with your eyes closed. When you have mastered this, try doing the exercise standing on a pillow.    Nose touch: Stand on one leg facing a wall. Stand 4 inches (10 centimeters) from the wall. Keep your body and leg straight. Slowly lean forward, trying to touch your nose to the wall and then return to the starting position. Make sure you do not bend forward at your waist. Do 2 sets of 10.    Wall jump: Face a wall and place a piece of masking tape about 2 feet (50 to 60 centimeters) above your head. Jump up with your arms above your head and try to touch the piece of tape. Make sure you do a \"spring\" type of motion and try to land softly on your feet. As the exercise gets easier, jump on just your injured leg. Do 2 sets of 15.    Side-lying leg lift: Lie on your uninjured side. Tighten the front thigh muscles on your injured leg and lift that leg 8 to 10 inches (20 to 25 centimeters) away from the other leg. Keep the leg straight and lower it slowly. Do 2 sets of 15.    When you can do these exercises without pain, you can start a light jogging program. You can return to your sport when there is no difference between your injured side and noninjured side when you do the exercises.    Developed by [employer].  Published by [employer].  Copyright  2014 [employer] and/or " one of its subsidiaries. All rights reserved.     Calm

## 2022-10-10 ENCOUNTER — HEALTH MAINTENANCE LETTER (OUTPATIENT)
Age: 32
End: 2022-10-10

## 2022-11-03 ENCOUNTER — TRANSFERRED RECORDS (OUTPATIENT)
Dept: HEALTH INFORMATION MANAGEMENT | Facility: CLINIC | Age: 32
End: 2022-11-03

## 2022-11-03 LAB
ALT SERPL-CCNC: 9 U/L (ref 6–29)
AST SERPL-CCNC: 11 U/L (ref 10–30)
CREATININE (EXTERNAL): 0.67 MG/DL (ref 0.5–0.97)
GFR ESTIMATED (EXTERNAL): 119 ML/MIN/1.73M2
GLUCOSE (EXTERNAL): 86 MG/DL (ref 65–99)
HEP C HIM: NORMAL
POTASSIUM (EXTERNAL): 3.5 MMOL/L (ref 3.5–5.3)
TSH SERPL-ACNC: 1.77 MIU/L (ref 0.4–4.5)

## 2022-11-07 ENCOUNTER — VIRTUAL VISIT (OUTPATIENT)
Dept: FAMILY MEDICINE | Facility: CLINIC | Age: 32
End: 2022-11-07
Payer: COMMERCIAL

## 2022-11-07 ENCOUNTER — TELEPHONE (OUTPATIENT)
Dept: FAMILY MEDICINE | Facility: CLINIC | Age: 32
End: 2022-11-07

## 2022-11-07 DIAGNOSIS — K04.7 TOOTH INFECTION: Primary | ICD-10-CM

## 2022-11-07 DIAGNOSIS — I77.6 AORTITIS (H): ICD-10-CM

## 2022-11-07 DIAGNOSIS — K04.7 TOOTH INFECTION: ICD-10-CM

## 2022-11-07 PROCEDURE — 99213 OFFICE O/P EST LOW 20 MIN: CPT | Mod: 95 | Performed by: FAMILY MEDICINE

## 2022-11-07 RX ORDER — OXYCODONE HYDROCHLORIDE 5 MG/1
5 TABLET ORAL DAILY PRN
Qty: 12 TABLET | Refills: 0 | Status: CANCELLED | OUTPATIENT
Start: 2022-11-07

## 2022-11-07 RX ORDER — ACETAMINOPHEN 500 MG
TABLET ORAL
Qty: 30 TABLET | Refills: 0 | Status: SHIPPED | OUTPATIENT
Start: 2022-11-07 | End: 2022-11-07

## 2022-11-07 RX ORDER — OXYCODONE HYDROCHLORIDE 5 MG/1
5 TABLET ORAL DAILY PRN
Qty: 12 TABLET | Refills: 0 | Status: SHIPPED | OUTPATIENT
Start: 2022-11-07 | End: 2022-11-07

## 2022-11-07 RX ORDER — OXYCODONE HYDROCHLORIDE 5 MG/1
5 TABLET ORAL DAILY PRN
Qty: 12 TABLET | Refills: 0 | Status: SHIPPED | OUTPATIENT
Start: 2022-11-07 | End: 2022-11-11

## 2022-11-07 NOTE — TELEPHONE ENCOUNTER
SAVI,      [facility]/pharmacy #[id number] - [place], [place] - [address]  [address]  [facility] [address]  Phone: [phone] Fax: [phone]    Patient and her mother Rubi called.  Patient saw Dentist today.  Has tooth infection, face is swollen.    Unable to do extraction today due to infections and patient's BP which was 189/115 per patient.    Was given 2 antibiotics: Clindamycin and Metronidazole to take.  [facility] dentist advised patient call PCP for pain medication due medications she is already on.  Patient states patient is currently on prednisone and Methotrexate  - this are not on her current medication list.    Mother states she can take Ibuprofen. Not clear why    Extraction is scheduled for Monday 11/14.    Could we send this information to you in [employer] or would you prefer to receive a phone call?:   Patient would prefer a phone call   Okay to leave a detailed message?: No at Cell number on file:    Telephone Information:   Mobile [phone]     Patient informed you are out of the office today  Please advise    Thank you,   Kiah Weber RN

## 2022-11-07 NOTE — PROGRESS NOTES
Michelle is a 31 year old who is being evaluated via a billable telephone visit.      What phone number would you like to be contacted at? [phone]   How would you like to obtain your AVS? MyChart    Assessment & Plan       ICD-10-CM    1. Tooth infection  K04.7 oxyCODONE (ROXICODONE) 5 MG tablet      2. Aortitis (H)  I77.6 oxyCODONE (ROXICODONE) 5 MG tablet        Patient's dentist declined to provide her with an opioid prescription, she was advised to contact her primary care provider for a prescription.  Patient's chart was reviewed.  No concerns for abuse.  Prescription sent for patient today.      Return in about 3 days (around 11/10/2022) for Follow-up with dentist.    Pierce Sinha MD  [facility]   Michelle is a 31 year old , presenting for the following health issues:  Pain      HPI     Patient scheduled  Telephonically to discuss persistent pain.  She was seen and evaluated by her dentist and was informed that she needs to see a specialist.  Dentist declined to provide her primary care provider    Patient is upset and frustrated.  Feels that she is being bounced around between multiple providers.    Review of Systems   Constitutional, HEENT, cardiovascular, pulmonary, gi and gu systems are negative, except as otherwise noted.      Objective           Vitals:  No vitals were obtained today due to virtual visit.    Physical Exam   healthy, alert and no distress  PSYCH: Alert and oriented times 3; coherent speech, normal   rate and volume, able to articulate logical thoughts, able   to abstract reason, no tangential thoughts, no hallucinations   or delusions  Her affect is normal  RESP: No cough, no audible wheezing, able to talk in full sentences  Remainder of exam unable to be completed due to telephone visits    No results found for any visits on 11/07/22.       oxyCODONE (ROXICODONE) 5 MG tablet 12 tablet 0 11/7/2022  No   Sig - Route: Take 1 tablet (5 mg) by mouth  daily as needed for pain - Oral   Sent to pharmacy as: oxyCODONE HCl 5 MG Oral Tablet (ROXICODONE)   Class: E-Prescribe   Earliest Fill Date: 11/7/2022   Order: [id number]   E-Prescribing Status: Receipt confirmed by pharmacy (11/7/2022  5:16 PM CST)   Prior authorization: Approved       Phone call duration: 12 minutes

## 2022-11-07 NOTE — TELEPHONE ENCOUNTER
I can send a Rx for tyleonol , can  You let them know ?  Once the antibiotics kick in the pain will be less m in about 24 to 48 hrs of starting the antibiotics     Thanks

## 2022-11-07 NOTE — TELEPHONE ENCOUNTER
FS,  Patient calling to report that pharmacy did not receive oxycodone prescription.  RN called to confirm.  Pended new order if approved.  Torrie JIMENEZ RN

## 2022-11-07 NOTE — TELEPHONE ENCOUNTER
LS,  Patient updated.  States [facility] can't take T[facility]enol?   No clear reason why.  Please advise.  Torrie JIMENEZ RN

## 2022-11-07 NOTE — TELEPHONE ENCOUNTER
Patient needs a virtual visit. Can I call her for a quick virtual visit. I will not be able to prescribe medications without a visit.     MD Ernestine

## 2022-11-07 NOTE — TELEPHONE ENCOUNTER
FS (DOD),   Patient calling about pain medication   Can't take Tylenol d/t heart medicine she said   Dentist was going to give pain meds - states d/t BP though pt needs to contact primary   States LS told her to go to vascular - vascular referred her to another specialty   States she's going round and round in circles to get pain meds for tooth infection   Please advise  Thanks,  Yolanda SANDOVAL RN

## 2022-11-08 NOTE — TELEPHONE ENCOUNTER
oxyCODONE (ROXICODONE) 5 MG tablet 12 tablet 0 11/7/2022  No   Sig - Route: Take 1 tablet (5 mg) by mouth daily as needed for pain - Oral   Sent to pharmacy as: oxyCODONE HCl 5 MG Oral Tablet (ROXICODONE)   Class: E-Prescribe   Earliest Fill Date: 11/7/2022   Order: [id number]   E-Prescribing Status: Receipt confirmed by pharmacy (11/7/2022  5:16 PM CST)   Prior authorization: Approved     Medication was sent again.     MD Ernestine

## 2022-11-11 ENCOUNTER — MYC REFILL (OUTPATIENT)
Dept: FAMILY MEDICINE | Facility: CLINIC | Age: 32
End: 2022-11-11

## 2022-11-11 DIAGNOSIS — K04.7 TOOTH INFECTION: ICD-10-CM

## 2022-11-11 DIAGNOSIS — I77.6 AORTITIS (H): ICD-10-CM

## 2022-11-11 RX ORDER — OXYCODONE HYDROCHLORIDE 5 MG/1
5-10 TABLET ORAL EVERY 4 HOURS PRN
Qty: 12 TABLET | Refills: 0 | Status: SHIPPED | OUTPATIENT
Start: 2022-11-11 | End: 2024-09-12

## 2022-11-11 RX ORDER — OXYCODONE HYDROCHLORIDE 5 MG/1
5 TABLET ORAL DAILY PRN
Qty: 12 TABLET | Refills: 0 | Status: SHIPPED | OUTPATIENT
Start: 2022-11-11 | End: 2022-11-11

## 2022-11-11 NOTE — TELEPHONE ENCOUNTER
Patient calling states unable to pick-up medication as ordered.     Can't  rx until 11/1/6. The pharmacy says they need the rx re-sent to state to take 2 pills every 6 hours. (oxycodone)      Celestina Fortune RN

## 2022-11-11 NOTE — TELEPHONE ENCOUNTER
JW,  Patient calling again.  See below from other RN.  Torrie JIMENEZ RN      Patient calling states unable to pick-up medication as ordered.      Can't  rx until 11/1/6. The pharmacy says they need the rx re-sent to state to take 2 pills every 6 hours. (oxycodone)        Celestina Fortune, VIPUL

## 2022-11-11 NOTE — TELEPHONE ENCOUNTER
I reviewed the chart.  There is not worrisome activity/refill history in [place] PDMP.     I do not see concerns of chemical dependency/drug seeking in chart and a negative drug screen 12/13/2020.     I presume the 11:30 appt Monday is a dental appt to address tooth pain/abscess.     I did refill the medication although remind that it would be the dentist's responsibility to prescribe pain management after that visit.     Joel Wegener,MD

## 2022-11-11 NOTE — TELEPHONE ENCOUNTER
MAGGI (DOD),  Patient called back  States she is at the pharmacy  Is frustrated the pharmacy is unable to fill new oxy Rx  Pharmacy cannot fill due to last Rx sent 11/7/22 for 12 days  Unable to fill new Rx unless written for 5mg Oxy 2 tablets every 4-6 hours, as patient took last Rx at this dosage  Advised ER if pain is uncontrolled and unable to receive more medications  States ER 'won't give pain meds due to heart condition'  Please advise  Charlotte MIRZA RN

## 2022-11-11 NOTE — TELEPHONE ENCOUNTER
Routing refill request to provider for review/approval because:  Drug not on the [facility] refill protocol   See message below  Last OV 11/7/22  Rx for 12 days sent 11/7/22  Charlotte MIRZA RN

## 2022-11-11 NOTE — TELEPHONE ENCOUNTER
FS or JW([facility]),  Patient calling to ask about prescription.  Says that pharmacy is telling her it's too soon to refill medication.  Patient notes she has been taking 2 every 4-6 hours because of the pain she is experiencing.  Instructions on prescription are not reflective of this- assume this is the trouble (tried to reach [facility], unable to get through)  Wondering if admin instructions can be updated.  Please advise.  Torrie JIMENEZ RN

## 2022-11-15 ENCOUNTER — TELEPHONE (OUTPATIENT)
Dept: FAMILY MEDICINE | Facility: CLINIC | Age: 32
End: 2022-11-15

## 2022-11-15 NOTE — TELEPHONE ENCOUNTER
FS,  Patient's mom calling about methotrexate and ibuprofen interaction.  Patient had dental surgery this morning and was prescribed ibuprofen.  Pharmacist had concerns about interactions between above.  Patient and mom state they have tried contacting prescriber of methotrexate, they would not prescribe anything different for pain.  RN asked if they had any alternative recommendations for pain management even if they would not prescribe.  Mom states they would not offer any recommendations.  RN advised they could also contact the dentist to see if he has any alternative recommendations based on circumstances.  RN offered to send message to FS, but not sure anything different can be done from out end.  Any thoughts?  Torrie JIMENEZ RN

## 2022-11-15 NOTE — TELEPHONE ENCOUNTER
VM left asking patient to call back.  See Mychart from today regarding dentist.  Message was sent to patient.    Kiah Weber RN

## 2022-11-15 NOTE — TELEPHONE ENCOUNTER
The only interaction I found between methotrexate and ibuprofen is a delay in the renal excretion of methotrexate.  I would recommend delaying her next dose of methotrexate by 2 days.  She can also contact her oral surgeon for an alternative medication other than ibuprofen.    If she decided to take both medications, advise her to schedule a virtual visit to discuss labs.    MD Ernestine

## 2022-11-17 ENCOUNTER — MYC MEDICAL ADVICE (OUTPATIENT)
Dept: FAMILY MEDICINE | Facility: CLINIC | Age: 32
End: 2022-11-17

## 2022-12-28 ENCOUNTER — TRANSFERRED RECORDS (OUTPATIENT)
Dept: HEALTH INFORMATION MANAGEMENT | Facility: CLINIC | Age: 32
End: 2022-12-28

## 2023-03-29 ENCOUNTER — TRANSFERRED RECORDS (OUTPATIENT)
Dept: HEALTH INFORMATION MANAGEMENT | Facility: CLINIC | Age: 33
End: 2023-03-29
Payer: COMMERCIAL

## 2023-06-10 ENCOUNTER — HEALTH MAINTENANCE LETTER (OUTPATIENT)
Age: 33
End: 2023-06-10

## 2023-09-01 ENCOUNTER — PHARMACY VISIT (OUTPATIENT)
Dept: ADMINISTRATIVE | Facility: CLINIC | Age: 33
End: 2023-09-01
Payer: COMMERCIAL

## 2023-09-01 NOTE — PROGRESS NOTES
[facility] Inflammatory Other Clinical Follow Up Assessment   Completed on  16:10:21 [facility], by Jessica Awad        Patient  Patient Name: CARLOS WEBSTER  :   EHR ID: [id number]       Activity Date      Activity Medications    HUMIRA        Care Details    Please enter patient's PDC. [QA Metric]   ? 1      Based on the patient's report or refill record over the last 6-12 months, does the patient appear to be taking less than 80% of their medication? [QA Metric]   ? No      What type of disease symptoms is the patient experiencing?   ? Other      What are the areas of the body that are affected by the disease state?   ? Other          Summary Notes   Spoke with pt for TM assessment. Humira 40mg every other week.   Denies SE, missed doses or medication changes. 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 She states it is hard to explain but she can tell Humira is working. States fatigue with doses has resolved and is no longer occurring. Denies questions or concerns today.   Ok for TM to follow up next quarter and invited pt to call sooner if questions or concerns arise.    Jessica Awad, Pharm.D.[facility]  [address] [place] [address]  [email or web address]  Office: [phone]

## 2023-10-13 ENCOUNTER — OFFICE VISIT (OUTPATIENT)
Dept: FAMILY MEDICINE | Facility: CLINIC | Age: 33
End: 2023-10-13
Payer: COMMERCIAL

## 2023-10-13 ENCOUNTER — TELEPHONE (OUTPATIENT)
Dept: FAMILY MEDICINE | Facility: CLINIC | Age: 33
End: 2023-10-13

## 2023-10-13 VITALS
DIASTOLIC BLOOD PRESSURE: 79 MMHG | HEIGHT: 62 IN | RESPIRATION RATE: 18 BRPM | SYSTOLIC BLOOD PRESSURE: 159 MMHG | WEIGHT: 141.9 LBS | BODY MASS INDEX: 26.11 KG/M2 | OXYGEN SATURATION: 99 % | TEMPERATURE: 97.2 F

## 2023-10-13 DIAGNOSIS — I77.6 AORTITIS (H): ICD-10-CM

## 2023-10-13 DIAGNOSIS — J45.31 MILD PERSISTENT ASTHMA WITH ACUTE EXACERBATION: ICD-10-CM

## 2023-10-13 DIAGNOSIS — J06.9 UPPER RESPIRATORY TRACT INFECTION, UNSPECIFIED TYPE: Primary | ICD-10-CM

## 2023-10-13 DIAGNOSIS — Z79.899 HIGH RISK MEDICATION USE: ICD-10-CM

## 2023-10-13 PROCEDURE — 99213 OFFICE O/P EST LOW 20 MIN: CPT | Performed by: PHYSICIAN ASSISTANT

## 2023-10-13 RX ORDER — FLUTICASONE PROPIONATE 220 UG/1
1 AEROSOL, METERED RESPIRATORY (INHALATION) 2 TIMES DAILY
Qty: 12 G | Refills: 0 | Status: SHIPPED | OUTPATIENT
Start: 2023-10-13 | End: 2024-09-12

## 2023-10-13 RX ORDER — AZITHROMYCIN 250 MG/1
TABLET, FILM COATED ORAL
Qty: 6 TABLET | Refills: 0 | Status: SHIPPED | OUTPATIENT
Start: 2023-10-13 | End: 2024-01-15

## 2023-10-13 RX ORDER — LOSARTAN POTASSIUM 50 MG/1
1 TABLET ORAL DAILY
COMMUNITY
Start: 2023-02-03 | End: 2024-09-19

## 2023-10-13 RX ORDER — FOLIC ACID 1 MG/1
1 TABLET ORAL
COMMUNITY
Start: 2022-09-29

## 2023-10-13 RX ORDER — ALBUTEROL SULFATE 90 UG/1
2 AEROSOL, METERED RESPIRATORY (INHALATION) EVERY 6 HOURS PRN
Qty: 18 G | Refills: 4 | Status: CANCELLED | OUTPATIENT
Start: 2023-10-13

## 2023-10-13 RX ORDER — ADALIMUMAB 40MG/0.4ML
0.4 KIT SUBCUTANEOUS
COMMUNITY
Start: 2023-03-29

## 2023-10-13 RX ORDER — ATORVASTATIN CALCIUM 20 MG/1
1 TABLET, FILM COATED ORAL DAILY
COMMUNITY
Start: 2023-01-03 | End: 2024-09-19

## 2023-10-13 RX ORDER — ALBUTEROL SULFATE 90 UG/1
2 AEROSOL, METERED RESPIRATORY (INHALATION) EVERY 6 HOURS PRN
Qty: 18 G | Refills: 0 | Status: SHIPPED | OUTPATIENT
Start: 2023-10-13 | End: 2024-09-12

## 2023-10-13 ASSESSMENT — ANXIETY QUESTIONNAIRES
IF YOU CHECKED OFF ANY PROBLEMS ON THIS QUESTIONNAIRE, HOW DIFFICULT HAVE THESE PROBLEMS MADE IT FOR YOU TO DO YOUR WORK, TAKE CARE OF THINGS AT HOME, OR GET ALONG WITH OTHER PEOPLE: SOMEWHAT DIFFICULT
3. WORRYING TOO MUCH ABOUT DIFFERENT THINGS: SEVERAL DAYS
1. FEELING NERVOUS, ANXIOUS, OR ON EDGE: NOT AT ALL
5. BEING SO RESTLESS THAT IT IS HARD TO SIT STILL: SEVERAL DAYS
GAD7 TOTAL SCORE: 5
6. BECOMING EASILY ANNOYED OR IRRITABLE: SEVERAL DAYS
2. NOT BEING ABLE TO STOP OR CONTROL WORRYING: SEVERAL DAYS
GAD7 TOTAL SCORE: 5
4. TROUBLE RELAXING: SEVERAL DAYS
7. FEELING AFRAID AS IF SOMETHING AWFUL MIGHT HAPPEN: NOT AT ALL

## 2023-10-13 ASSESSMENT — PATIENT HEALTH QUESTIONNAIRE - PHQ9
SUM OF ALL RESPONSES TO PHQ QUESTIONS 1-9: 5
SUM OF ALL RESPONSES TO PHQ QUESTIONS 1-9: 5
10. IF YOU CHECKED OFF ANY PROBLEMS, HOW DIFFICULT HAVE THESE PROBLEMS MADE IT FOR YOU TO DO YOUR WORK, TAKE CARE OF THINGS AT HOME, OR GET ALONG WITH OTHER PEOPLE: SOMEWHAT DIFFICULT

## 2023-10-13 ASSESSMENT — ASTHMA QUESTIONNAIRES: ACT_TOTALSCORE: 8

## 2023-10-13 NOTE — TELEPHONE ENCOUNTER
Patient called with confusion on prescriptions.  Was looking for antibiotic, inhaler and methotrexate.     RN huddled with provider    PCP send antibiotic earlier today.  Will send inhaler.  Cannot sent methotrexate.    Antibiotics were sent for two of the kids.  Lj does not need an antibiotic- Emmanuel said ears looked good.    Patient disconnected call because she was working with heart team, will call back for update.  Torrie JIMENEZ RN

## 2023-10-13 NOTE — PROGRESS NOTES
"  Assessment & Plan     Upper respiratory tract infection, unspecified type  Will treat due to high risk  - azithromycin (ZITHROMAX) 250 MG tablet; Two tablets first day, then one tablet daily for four days.    Aortitis (H24)      High risk medication use      Mild persistent asthma with acute exacerbation    - albuterol (PROAIR HFA/PROVENTIL HFA/VENTOLIN HFA) 108 (90 Base) MCG/ACT inhaler; Inhale 2 puffs into the lungs every 6 hours as needed for shortness of breath, wheezing or cough  - fluticasone (FLOVENT HFA) 220 MCG/ACT inhaler; Inhale 1 puff into the lungs 2 times daily             Nicotine/Tobacco Cessation:  She reports that she has been smoking cigarettes. She has been smoking an average of .5 packs per day. She has never used smokeless tobacco.  Nicotine/Tobacco Cessation Plan:   Information offered: Patient not interested at this time      BMI:   Estimated body mass index is 26.38 kg/m  as calculated from the following:    Height as of this encounter: 1.562 m (5' 1.5\").    Weight as of this encounter: 64.4 kg (141 lb 14.4 oz).           MICK [facility]    Mechelle Martinez is a 32 year old, presenting for the following health issues:  URI        10/13/2023    11:57 AM   Additional Questions   Roomed by Esmer DEVLIN   Accompanied by mother and children       History of Present Illness       Reason for visit:  Sick my houshould as well  Symptom onset:  1-2 weeks ago    She eats 4 or more servings of fruits and vegetables daily.She consumes 1 sweetened beverage(s) daily.She exercises with enough effort to increase her heart rate 10 to 19 minutes per day.  She exercises with enough effort to increase her heart rate 4 days per week.   She is taking medications regularly.           Acute Illness  Acute illness concerns: illness  Onset/Duration: 1-2 weeks  Symptoms:  Fever: YES- 101 axillary  Chills/Sweats: YES- both  Headache (location?): YES-   Sinus Pressure: " "No  Conjunctivitis:  YES  Ear Pain: no  Rhinorrhea: No  Congestion: YES-chest  Sore Throat: No  Cough: YES-productive of clear sputum, productive of yellow sputum, with shortness of breath, barking until vomits, worsening over time  Wheeze: YES  Decreased Appetite: YES  Nausea: YES  Vomiting: YES  Diarrhea: No but more soft  Dysuria/Freq.: No but not urinating as much as usual   Dysuria or Hematuria: No  Fatigue/Achiness: YES  Sick/Strep Exposure: YES- family   Therapies tried and outcome: tylenol      Review of Systems   Constitutional, HEENT, cardiovascular, pulmonary, gi and gu systems are negative, except as otherwise noted.      Objective    BP (!) 159/79   Temp 97.2  F (36.2  C) (Temporal)   Resp 18   Ht 1.562 m (5' 1.5\")   Wt 64.4 kg (141 lb 14.4 oz)   SpO2 99%   BMI 26.38 kg/m    Body mass index is 26.38 kg/m .  Physical Exam   GENERAL: alert and no distress  EYES: Eyes grossly normal to inspection  HENT: ear canals and TM's normal, nose and mouth without ulcers or lesions  RESP: rhonchi throughout  CV: regular rate and rhythm, normal S1 S2, no S3 or S4, no murmur, click or rub, no peripheral edema and peripheral pulses strong                      "

## 2023-11-13 ENCOUNTER — DOCUMENTATION ONLY (OUTPATIENT)
Dept: LAB | Facility: CLINIC | Age: 33
End: 2023-11-13

## 2023-11-13 ENCOUNTER — VIRTUAL VISIT (OUTPATIENT)
Dept: FAMILY MEDICINE | Facility: CLINIC | Age: 33
End: 2023-11-13
Payer: COMMERCIAL

## 2023-11-13 DIAGNOSIS — I77.6 AORTITIS (H): ICD-10-CM

## 2023-11-13 DIAGNOSIS — J01.90 ACUTE SINUSITIS WITH SYMPTOMS > 10 DAYS: Primary | ICD-10-CM

## 2023-11-13 DIAGNOSIS — J45.20 MILD INTERMITTENT ASTHMA WITHOUT COMPLICATION: ICD-10-CM

## 2023-11-13 PROCEDURE — 99213 OFFICE O/P EST LOW 20 MIN: CPT | Mod: VID | Performed by: FAMILY MEDICINE

## 2023-11-13 RX ORDER — DOXYCYCLINE 100 MG/1
100 CAPSULE ORAL 2 TIMES DAILY
Qty: 20 CAPSULE | Refills: 0 | Status: SHIPPED | OUTPATIENT
Start: 2023-11-13 | End: 2023-11-23

## 2023-11-13 NOTE — PROGRESS NOTES
This patient has an upcoming lab appointment - No orders found in chart  Please place orders if needed or contact patient    Thank you  [facility] lab staff

## 2023-11-13 NOTE — PROGRESS NOTES
"Instructions Relayed to Patient by Virtual Roomer:     Patient is active on [employer]:   Relayed following to patient: \"It looks like you are active on [employer], are you able to join the visit this way? If not, do you need us to send you a link now or would you like your provider to send a link via text or email when they are ready to initiate the visit?\"    Reminded patient to ensure they were logged on to virtual visit by arrival time listed. Documented in appointment notes if patient had flexibility to initiate visit sooner than arrival time. If pediatric virtual visit, ensured pediatric patient along with parent/guardian will be present for video visit.     Patient offered the website [email or web address] and/or phone number to [employer] Help line: [phone]    Michelle is a 32 year old who is being evaluated via a billable video visit.      How would you like to obtain your AVS? [employer]  If the video visit is dropped, the invitation should be resent by: Text to cell phone: [phone]  Will anyone else be joining your video visit? No          Assessment & Plan   1. Acute sinusitis with symptoms > 10 days  Patient presenting with greater than 10-day duration of symptoms. Given this, will treat with antibiotic course prescribed below. Discussed completing the entire course of antibiotics. Discussed common side effects of the medication.  Recommend following up if not improving, new symptoms, medication side effects, or if worsening despite treatment. Discussed other conservative cares (nasal saline rinses using distilled water, decongestants, nasal Flonase, etc).  Patient agreeable with plan.  - doxycycline hyclate (VIBRAMYCIN) 100 MG capsule; Take 1 capsule (100 mg) by mouth 2 times daily for 10 days  Dispense: 20 capsule; Refill: 0    2. Mild intermittent asthma without complication  High risk with URI's.    3. Aortitis (H24)  Immunocompromised.       Kenrick Jimenes MD  [facility]    Disclaimer: This note consists of symbols derived from keyboarding, dictation and/or voice recognition software. As a result, there may be errors in the script that have gone undetected. Please consider this when interpreting information found in this chart.    Mechelle Martinez is a 32 year old, presenting for the following health issues:  URI        11/13/2023    10:05 AM   Additional Questions   Roomed by josé miguel   Accompanied by self     Asthma questions were not completed due to pt illness    URI    History of Present Illness       Reason for visit:  URI  Symptom onset:  1-3 days ago  Symptoms include:  Headache, cough with clear/greenish mucus, runny nose, congestion, sore throat  Symptom intensity:  Mild  Symptom progression:  Worsening  Had these symptoms before:  No  What makes it worse:  Sound and light  What makes it better:  Closing eyes, no noise      Acute Illness  Acute illness concerns: URI  Onset/Duration: 2 days  Symptoms:  Fever: No  Chills/Sweats: No  Headache (location?): YES  Sinus Pressure: No  Conjunctivitis:  No  Ear Pain: YES: both - pt had an ear infection a month ago  Rhinorrhea: YES  Congestion: YES  Sore Throat: YES  Cough: YES-productive of clear sputum, productive of green sputum, with shortness of breath  Wheeze: No  Decreased Appetite: No  Nausea: No  Vomiting: No  Diarrhea: No  Dysuria/Freq.: No  Dysuria or Hematuria: No  Fatigue/Achiness: No  Sick/Strep Exposure: No  Therapies tried and outcome: None    Sons are also sick with similar symptoms. Right ear plugging not improved with azithromycin. Facial pressure. Symptoms since last month. Did urgent care visit at that time. One son is better.    Review of Systems   Constitutional, HEENT, cardiovascular, pulmonary, GI, , musculoskeletal, neuro, skin, endocrine and psych systems are negative, except as otherwise noted.      Objective           Vitals:  No vitals were obtained today due to virtual  visit.    Physical Exam   GENERAL: Healthy, alert and no distress  EYES: Eyes grossly normal to inspection.  No discharge or erythema, or obvious scleral/conjunctival abnormalities.  RESP: No audible wheeze, cough, or visible cyanosis.  No visible retractions or increased work of breathing.    SKIN: Visible skin clear. No significant rash, abnormal pigmentation or lesions.  NEURO: Cranial nerves grossly intact.  Mentation and speech appropriate for age.  PSYCH: Mentation appears normal, affect normal/bright, judgement and insight intact, normal speech and appearance well-groomed.    Labs: none        Video-Visit Details    Type of service:  Video Visit   Video Start Time: 1:35 PM  Video End Time:1:56 PM    Originating Location (pt. Location): Home    Distant Location (provider location):  On-site  Platform used for Video Visit: [employer]

## 2023-11-15 ENCOUNTER — TELEPHONE (OUTPATIENT)
Dept: FAMILY MEDICINE | Facility: CLINIC | Age: 33
End: 2023-11-15
Payer: COMMERCIAL

## 2023-11-15 ENCOUNTER — DOCUMENTATION ONLY (OUTPATIENT)
Dept: FAMILY MEDICINE | Facility: CLINIC | Age: 33
End: 2023-11-15
Payer: COMMERCIAL

## 2023-11-15 DIAGNOSIS — J02.9 SORE THROAT: Primary | ICD-10-CM

## 2023-11-15 NOTE — TELEPHONE ENCOUNTER
RNs,   Patient called.   Requesting strep test for lab appt tomorrow at [facility].  States she was seen by a different provider than her son.   Had virtual visit on 11/13/23.  Patient has the same symptoms of her son, and her son was able to get a strep test.  Wondering why the orders aren't in.   Needs orders ASAP.  Please advise.   Thanks!  Angela GONZALES

## 2023-11-15 NOTE — PROGRESS NOTES
This patient has a future lab only appointment tomorrow 11/16/2023 and needs orders. Please send orders. Thanks, [facility]

## 2023-11-15 NOTE — PROGRESS NOTES
Patient is calling back requesting a strep order. Informed RN that provider wanted her to have this test done. RN does not see any documentation of this.     Patient has a visit tomorrow to have a strep test completed.   She did start the antibiotic as prescribed.     Iron Esposito, MSN, RN, PHN  [facility]  November 15, 2023

## 2023-11-15 NOTE — TELEPHONE ENCOUNTER
Normal lab(s) released to pt's MyChart. RN reviewed patient chart.    Orders have been placed by provider.     RN Triage    Patient Contact    Attempt # 1    Was call answered?  No.  Left message on voicemail with information to call me back.    Upon callback please advise patient appointment for strep swab has been placed in chart.     Appointments in Next Year      Nov 16, 2023 10:15 AM  LAB with [facility]  [facility] ([facility] ) [phone]          ROXANE Patricio, RN  [facility] ~ Registered Nurse  Clinic Triage ~ [place] [facility]  November 15, 2023

## 2023-11-16 ENCOUNTER — LAB (OUTPATIENT)
Dept: LAB | Facility: CLINIC | Age: 33
End: 2023-11-16
Payer: COMMERCIAL

## 2023-11-16 DIAGNOSIS — J02.9 SORE THROAT: ICD-10-CM

## 2023-11-16 LAB
DEPRECATED S PYO AG THROAT QL EIA: NEGATIVE
GROUP A STREP BY PCR: NOT DETECTED

## 2023-11-16 PROCEDURE — 87651 STREP A DNA AMP PROBE: CPT

## 2023-11-16 NOTE — RESULT ENCOUNTER NOTE
Please inform of results if patient has not viewed in [employer] within 3 business days.    Your strep test was normal.    Please call the clinic with any questions you may have.     Have a great day,    Dr. Krishna

## 2023-11-16 NOTE — TELEPHONE ENCOUNTER
Jolenet has not been read but patient completed strep swab lab today (in process). Closing encounter.     REFUGIO PatricioN, RN  [facility] ~ Registered Nurse  Clinic Triage ~ [place] [facility]  November 16, 2023

## 2023-11-16 NOTE — TELEPHONE ENCOUNTER
RN Triage    Patient Contact    Attempt # 2    Was call answered?  No.  Unable to leave message. Mailbox is full.     RN attempted to call patient to inform her that Srtep swab has been ordered. Unable to leave voicemail as mailbox was full.     [facility] as patient has 10:15 lab appointment today.     ROXANE Patricio, RN  [facility] ~ Registered Nurse  Clinic Triage ~ [place] [facility]  November 16, 2023

## 2023-11-17 ENCOUNTER — NURSE TRIAGE (OUTPATIENT)
Dept: FAMILY MEDICINE | Facility: CLINIC | Age: 33
End: 2023-11-17

## 2023-11-17 ENCOUNTER — OFFICE VISIT (OUTPATIENT)
Dept: FAMILY MEDICINE | Facility: CLINIC | Age: 33
End: 2023-11-17
Payer: COMMERCIAL

## 2023-11-17 VITALS
RESPIRATION RATE: 16 BRPM | SYSTOLIC BLOOD PRESSURE: 180 MMHG | WEIGHT: 142.9 LBS | HEIGHT: 60 IN | TEMPERATURE: 98.3 F | BODY MASS INDEX: 28.06 KG/M2 | DIASTOLIC BLOOD PRESSURE: 85 MMHG | HEART RATE: 108 BPM | OXYGEN SATURATION: 97 %

## 2023-11-17 DIAGNOSIS — Z91.030 H/O BEE STING ALLERGY: ICD-10-CM

## 2023-11-17 DIAGNOSIS — J32.0 CHRONIC MAXILLARY SINUSITIS: ICD-10-CM

## 2023-11-17 DIAGNOSIS — Z91.09 ENVIRONMENTAL ALLERGIES: Primary | ICD-10-CM

## 2023-11-17 PROCEDURE — 99213 OFFICE O/P EST LOW 20 MIN: CPT | Performed by: PHYSICIAN ASSISTANT

## 2023-11-17 RX ORDER — FLUTICASONE PROPIONATE 50 MCG
1 SPRAY, SUSPENSION (ML) NASAL DAILY
Qty: 10 ML | Refills: 1 | Status: SHIPPED | OUTPATIENT
Start: 2023-11-17

## 2023-11-17 RX ORDER — LORATADINE 10 MG/1
10 TABLET ORAL DAILY
Qty: 90 TABLET | Refills: 3 | Status: CANCELLED | OUTPATIENT
Start: 2023-11-17

## 2023-11-17 RX ORDER — LORATADINE 10 MG/1
10 TABLET ORAL DAILY
Qty: 30 TABLET | Refills: 11 | Status: SHIPPED | OUTPATIENT
Start: 2023-11-17 | End: 2024-09-12

## 2023-11-17 RX ORDER — EPINEPHRINE 0.3 MG/.3ML
0.3 INJECTION SUBCUTANEOUS PRN
Qty: 2 EACH | Refills: 11 | Status: SHIPPED | OUTPATIENT
Start: 2023-11-17

## 2023-11-17 ASSESSMENT — PAIN SCALES - GENERAL: PAINLEVEL: EXTREME PAIN (8)

## 2023-11-17 ASSESSMENT — ASTHMA QUESTIONNAIRES: ACT_TOTALSCORE: 14

## 2023-11-17 NOTE — TELEPHONE ENCOUNTER
"S-(situation): Mom has had issues with ear infections and would like to be seen for a provider to check her ears.    B-(background): Had virtual visit on 11/13 and prescribed medication for acute sinusitis.    A-(assessment): Has been having right ear pain, sore throat, and lots of congestion.    R-(recommendations): Advised mom I would send chart over to clinic to see if she can get an appointment.     Sara Mejia RN        Reason for Disposition   Patient wants to be seen    Additional Information   Negative: Moving the earlobe or touching the ear clearly increases the pain   Negative: Foreign body stuck in the ear (e.g., bug, piece of cotton)   Negative: Pink or red swelling behind the ear   Negative: Stiff neck (can't touch chin to chest)   Negative: Patient sounds very sick or weak to the triager   Negative: Severe earache pain   Negative: Fever > 103 F (39.4 C)   Negative: Pointed object was inserted into the ear canal (e.g., a pencil, stick, or wire)   Negative: White, yellow, or green discharge   Negative: Diabetes mellitus or a weak immune system (e.g., HIV positive, cancer chemotherapy, transplant patient)   Negative: Bloody discharge or unexplained bleeding from ear canal   Negative: New blurred vision or vision changes    Answer Assessment - Initial Assessment Questions  1. LOCATION: \"Which ear is involved?\"      Right ear  2. ONSET: \"When did the ear start hurting\"       Been hurting for a while  3. SEVERITY: \"How bad is the pain?\"  (Scale 1-10; mild, moderate or severe)    - MILD (1-3): doesn't interfere with normal activities     - MODERATE (4-7): interferes with normal activities or awakens from sleep     - SEVERE (8-10): excruciating pain, unable to do any normal activities       Moderate  4. URI SYMPTOMS: \"Do you have a runny nose or cough?\"      Yes  5. FEVER: \"Do you have a fever?\" If Yes, ask: \"What is your temperature, how was it measured, and when did it start?\"      No  6. CAUSE: \"Have you " "been swimming recently?\", \"How often do you use Q-TIPS?\", \"Have you had any recent air travel or scuba diving?\"      No  7. OTHER SYMPTOMS: \"Do you have any other symptoms?\" (e.g., headache, stiff neck, dizziness, vomiting, runny nose, decreased hearing)      No  8. PREGNANCY: \"Is there any chance you are pregnant?\" \"When was your last menstrual period?\"      NO    Protocols used: Earache-A-OH    "

## 2023-11-17 NOTE — RESULT ENCOUNTER NOTE
Please inform of results if patient has not viewed in [employer] within 3 business days.    Your follow up strep test was normal.    Please call the clinic with any questions you may have.     Have a great day,    Dr. Krishna

## 2023-11-17 NOTE — PROGRESS NOTES
Assessment & Plan     Environmental allergies    - loratadine (CLARITIN) 10 MG tablet; Take 1 tablet (10 mg) by mouth daily    H/O bee sting allergy    - EPINEPHrine (ANY BX GENERIC EQUIV) 0.3 MG/0.3ML injection 2-pack; Inject 0.3 mLs (0.3 mg) into the muscle as needed for anaphylaxis May repeat one time in 5-15 minutes if response to initial dose is inadequate.    Chronic maxillary sinusitis  Still on antibiotic currenlty  - Adult ENT  Referral; Future  - fluticasone (FLONASE) 50 MCG/ACT nasal spray; Spray 1 spray into both nostrils daily  - loratadine (CLARITIN) 10 MG tablet; Take 1 tablet (10 mg) by mouth daily                 Bimal Pearl PA-C  [facility]    Mechelle Martinez is a 32 year old, presenting for the following health issues:  Ear Problem      Ear Problem             Concern -Right ear pain   Onset: Since last month  Description: Ear pain followed by pressure  Intensity: mild  Progression of Symptoms:  worsening  Accompanying Signs & Symptoms: Headache, cough, runny noise  Previous history of similar problem: Yes   Precipitating factors:        Worsened by: Noise   Alleviating factors:        Improved by: None  Therapies tried and outcome: None      Review of Systems   HENT:  Positive for ear pain.             Objective    LMP 11/11/2023 (Approximate)   There is no height or weight on file to calculate BMI.  Physical Exam   GENERAL: alert and no distress  EYES: Eyes grossly normal to inspection  HENT: b/l TM dullness with loss of light reflux nasal mucosa is erythematous and edematous   RESP: lungs clear to auscultation - no rales, rhonchi or wheezes  CV: regular rate and rhythm, normal S1 S2, no S3 or S4, no murmur, click or rub, no peripheral edema and peripheral pulses strong

## 2024-01-15 ENCOUNTER — TELEPHONE (OUTPATIENT)
Dept: NURSING | Facility: CLINIC | Age: 34
End: 2024-01-15

## 2024-01-15 ENCOUNTER — VIRTUAL VISIT (OUTPATIENT)
Dept: INTERNAL MEDICINE | Facility: CLINIC | Age: 34
End: 2024-01-15
Payer: COMMERCIAL

## 2024-01-15 DIAGNOSIS — J45.31 MILD PERSISTENT ASTHMA WITH ACUTE EXACERBATION: Primary | ICD-10-CM

## 2024-01-15 DIAGNOSIS — J06.9 VIRAL UPPER RESPIRATORY TRACT INFECTION: ICD-10-CM

## 2024-01-15 PROCEDURE — 99214 OFFICE O/P EST MOD 30 MIN: CPT | Mod: 95

## 2024-01-15 RX ORDER — AZITHROMYCIN 250 MG/1
TABLET, FILM COATED ORAL
Qty: 6 TABLET | Refills: 0 | Status: SHIPPED | OUTPATIENT
Start: 2024-01-15 | End: 2024-01-20

## 2024-01-15 NOTE — TELEPHONE ENCOUNTER
The patient is calling and says she completed a virtual visit today on 01/15/24.  She says another person that resides in the home has tested positive for Covid-19 today on 01/15/24 and everyone in the home has the same type of symptoms, but the children's test is negative  She says she is scheduled for a lab strep test tomorrow on 01/16/23  She is requesting an extended note for her children to home isolate for the required 5 days from when the symptoms started.   She is inquiring if she should keep her appointment tomorrow for the strep test and is in need of a extended school note for her children.

## 2024-01-15 NOTE — PROGRESS NOTES
Michelle is a 33 year old who is being evaluated via a billable video visit.      How would you like to obtain your AVS? MyChart  If the video visit is dropped, the invitation should be resent by: Text to cell phone: [phone]  Will anyone else be joining your video visit? No          Assessment & Plan     (J45.31) Mild persistent asthma with acute exacerbation  (primary encounter diagnosis)  Comment: Pt endorses symptoms of sinus congestion, fevers with Tmax 101, sore throat x 3 days.  Endorses coughing and shortness of breath- likely exacerbation of asthma. She has history of asthma- has been using albuterol inhaler QID over the past few days. She takes prednisone daily for her hx of aortitis, unclear of dosage. With her being immunocompromised, I will treat her URI with azithromycin.   Plan:     (J06.9) Viral upper respiratory tract infection  Comment: see details above  Plan:                Sanjuanita Garcia, WANDA [facility]   Michelle is a 33 year old, presenting for the following health issues:  URI      HPI           Objective           Vitals:  No vitals were obtained today due to virtual visit.    Physical Exam   GENERAL: Healthy, alert and no distress  EYES: Eyes grossly normal to inspection.  No discharge or erythema, or obvious scleral/conjunctival abnormalities.  RESP: No audible wheeze, cough, or visible cyanosis.  No visible retractions or increased work of breathing.    SKIN: Visible skin clear. No significant rash, abnormal pigmentation or lesions.  NEURO: Cranial nerves grossly intact.  Mentation and speech appropriate for age.  PSYCH: Mentation appears normal, affect normal/bright, judgement and insight intact, normal speech and appearance well-groomed.                Video-Visit Details    Type of service:  Video Visit   Video Start Time: 4:03 PM  Video End Time:4:22 PM    Originating Location (pt. Location): Home    Distant  Location (provider location):  On-site  Platform used for Video Visit: vEelyn

## 2024-01-16 NOTE — TELEPHONE ENCOUNTER
I did not order a strep test for her- I recommended she test herself for covid and I sent antibiotics for presumed bronchitis. She will need to ask her children's doctor for a letter. I cannot provide a letter for her children as they are not my patient/patients

## 2024-01-16 NOTE — TELEPHONE ENCOUNTER
Called patient and relayed message from provider.  Stated that she has all of the below taken care of by her son's provider.

## 2024-08-03 ENCOUNTER — HEALTH MAINTENANCE LETTER (OUTPATIENT)
Age: 34
End: 2024-08-03

## 2024-09-12 ENCOUNTER — TELEPHONE (OUTPATIENT)
Dept: FAMILY MEDICINE | Facility: CLINIC | Age: 34
End: 2024-09-12

## 2024-09-12 ENCOUNTER — VIRTUAL VISIT (OUTPATIENT)
Dept: FAMILY MEDICINE | Facility: CLINIC | Age: 34
End: 2024-09-12
Payer: COMMERCIAL

## 2024-09-12 DIAGNOSIS — I77.6 AORTITIS (H): Primary | ICD-10-CM

## 2024-09-12 DIAGNOSIS — J45.31 MILD PERSISTENT ASTHMA WITH ACUTE EXACERBATION: ICD-10-CM

## 2024-09-12 PROBLEM — F32.1 CURRENT MODERATE EPISODE OF MAJOR DEPRESSIVE DISORDER WITHOUT PRIOR EPISODE (H): Status: RESOLVED | Noted: 2021-02-17 | Resolved: 2024-09-12

## 2024-09-12 PROCEDURE — 99214 OFFICE O/P EST MOD 30 MIN: CPT | Mod: 95 | Performed by: PHYSICIAN ASSISTANT

## 2024-09-12 RX ORDER — ALBUTEROL SULFATE 90 UG/1
2 AEROSOL, METERED RESPIRATORY (INHALATION) EVERY 6 HOURS PRN
Qty: 18 G | Refills: 0 | Status: SHIPPED | OUTPATIENT
Start: 2024-09-12

## 2024-09-12 RX ORDER — FLUTICASONE PROPIONATE 220 UG/1
1 AEROSOL, METERED RESPIRATORY (INHALATION) 2 TIMES DAILY
Qty: 12 G | Refills: 0 | Status: SHIPPED | OUTPATIENT
Start: 2024-09-12

## 2024-09-12 RX ORDER — PREDNISONE 10 MG/1
10 TABLET ORAL DAILY
COMMUNITY
Start: 2023-03-29

## 2024-09-12 NOTE — PROGRESS NOTES
"Michelle is a 33 year old who is being evaluated via a billable video visit.    How would you like to obtain your AVS? MyChart  If the video visit is dropped, the invitation should be resent by: Text to cell phone: [phone]  Will anyone else be joining your video visit? No      Assessment & Plan     Aortitis (H24)  Difficult due to coordination of care.  I am very unsure of the ongoing plan at this time, and because of this, she would like to switch all care to [facility]  I have placed referrals to get established.  She may need some paperwork filled out due to missing of work and unable to pay utilities.  Will look at those when she sends them  - Adult Rheumatology  Referral; Future  - Vascular Medicine Referral; Future    Mild persistent asthma with acute exacerbation  Has been without meds for several months, and will update these.  - fluticasone (FLOVENT HFA) 220 MCG/ACT inhaler; Inhale 1 puff into the lungs 2 times daily.  - albuterol (PROAIR HFA/PROVENTIL HFA/VENTOLIN HFA) 108 (90 Base) MCG/ACT inhaler; Inhale 2 puffs into the lungs every 6 hours as needed for shortness of breath, wheezing or cough.          Nicotine/Tobacco Cessation  She reports that she has been smoking cigarettes. She has never used smokeless tobacco.  Nicotine/Tobacco Cessation Plan  Information offered: Patient not interested at this time      BMI  Estimated body mass index is 27.51 kg/m  as calculated from the following:    Height as of 11/17/23: 1.535 m (5' 0.43\").    Weight as of 11/17/23: 64.8 kg (142 lb 14.4 oz).             Subjective   Michelle is a 33 year old, presenting for the following health issues:  No chief complaint on file.        11/17/2023     3:56 PM   Additional Questions   Roomed by Karo DEVLIN   Accompanied by son       Video Start Time: 1:06 PM    HPI   Discuss about current medications as patient has not been able to refill majority of meds since last visit. Also wanting to request labs   Requesting " Humira to be sent to [facility]     3 weeks in Aug  Hyperlipidemia Follow-Up    Are you regularly taking any medication or supplement to lower your cholesterol?   Yes-    Are you having muscle aches or other side effects that you think could be caused by your cholesterol lowering medication?  No    Asthma Follow-Up    Was ACT completed today?  No    Do you have a cough?  YES  Are you experiencing any wheezing in your chest?  YES  Do you have any shortness of breath?  YES   How often are you using a short-acting (rescue) inhaler or nebulizer, such as Albuterol?  2-3 times per day  How many days per week do you miss taking your asthma controller medication?  I do not have an asthma controller medication  Please describe any recent triggers for your asthma: smoke, upper respiratory infections, and simply walking   Have you had any Emergency Room Visits, Urgent Care Visits, or Hospital Admissions since your last office visit?  No    Michelle has also been dealing with aortitis and following with both rheumatology and vascular through outside system clinics.  Last rheum note I have was back from March 2023 and very little from vascular.  She does related that she gets labs and CT scans from them, although 18 months since last CT, although she had done more recently.  She has been having issues with corresponding with them, trouble getting meds refilled and some question whether she has been discharged from rheumatology due to no shows.      Review of Systems  Constitutional, HEENT, cardiovascular, pulmonary, gi and gu systems are negative, except as otherwise noted.      Objective           Vitals:  No vitals were obtained today due to virtual visit.    Physical Exam   GENERAL: alert and no distress  EYES: Eyes grossly normal to inspection.  No discharge or erythema, or obvious scleral/conjunctival abnormalities.  RESP: No audible wheeze, cough, or visible cyanosis.    SKIN: Visible skin clear. No  significant rash, abnormal pigmentation or lesions.  NEURO: Cranial nerves grossly intact.  Mentation and speech appropriate for age.  PSYCH: Appropriate affect, tone, and pace of words          Video-Visit Details    Type of service:  Video Visit   Video End Time:1:30 PM  Originating Location (pt. Location): Home    Distant Location (provider location):  On-site  Platform used for Video Visit: Evelyn  Signed Electronically by: Bimal Pearl PA-C

## 2024-09-12 NOTE — TELEPHONE ENCOUNTER
Spoke w/ patient.   Scheduled for virtual visit w/ JS today.   Patient will have xcel fax letter to main fax [facility] at [facility] now.     Angela GONZALES

## 2024-09-12 NOTE — TELEPHONE ENCOUNTER
TC pool  Please assist pt to set up hopefully an in clinic, but at least a virtual to get her concerns met  Change PCP to ABEL Pearl    Pt called, needs paperwork filled out for [facility] [employer], she will have them fax needed form and needs a referral for for inflammation of vascular  Pt has had an issue with the [employer] system, pt wants everything to be with [facility], she needs cardiology and rheumatology     Best call back number is   She also would like the fax number to [facility] so the paperwork can be faxed to the clinic    Haley Burns RN   [facility]

## 2024-09-12 NOTE — TELEPHONE ENCOUNTER
Forms/Letter Request    Type of form/letter: OTHER:  critical life sustaining medical equipment and emergency form        Do we have the form/letter: Yes: form    Who is the form from?  [employer]  (if other please explain)    Where did/will the form come from? form was faxed in    When is form/letter needed by: tashia    How would you like the form/letter returned: Fax : [phone]    Patient Notified form requests are processed in 5-7 business days:Yes FWD form to JS to follow up for 9/12/2024 VV regarding form     Could we send this information to you in [employer] or would you prefer to receive a phone call?:   No preference   Okay to leave a detailed message?: No at Other phone number:  [phone]

## 2024-09-18 ENCOUNTER — TELEPHONE (OUTPATIENT)
Dept: FAMILY MEDICINE | Facility: CLINIC | Age: 34
End: 2024-09-18

## 2024-09-18 ENCOUNTER — HOSPITAL ENCOUNTER (EMERGENCY)
Facility: CLINIC | Age: 34
Discharge: HOME | End: 2024-09-18
Attending: EMERGENCY MEDICINE | Admitting: EMERGENCY MEDICINE
Payer: COMMERCIAL

## 2024-09-18 VITALS
HEIGHT: 60 IN | DIASTOLIC BLOOD PRESSURE: 89 MMHG | WEIGHT: 145.5 LBS | SYSTOLIC BLOOD PRESSURE: 168 MMHG | HEART RATE: 117 BPM | TEMPERATURE: 98.5 F | OXYGEN SATURATION: 99 % | RESPIRATION RATE: 16 BRPM | BODY MASS INDEX: 28.57 KG/M2

## 2024-09-18 DIAGNOSIS — R19.7 DIARRHEA, UNSPECIFIED TYPE: ICD-10-CM

## 2024-09-18 DIAGNOSIS — R11.2 NAUSEA AND VOMITING, UNSPECIFIED VOMITING TYPE: ICD-10-CM

## 2024-09-18 PROCEDURE — 99283 EMERGENCY DEPT VISIT LOW MDM: CPT | Performed by: EMERGENCY MEDICINE

## 2024-09-18 PROCEDURE — 99284 EMERGENCY DEPT VISIT MOD MDM: CPT | Performed by: EMERGENCY MEDICINE

## 2024-09-18 PROCEDURE — 250N000011 HC RX IP 250 OP 636: Performed by: EMERGENCY MEDICINE

## 2024-09-18 RX ORDER — ONDANSETRON 4 MG/1
4 TABLET, ORALLY DISINTEGRATING ORAL ONCE
Status: COMPLETED | OUTPATIENT
Start: 2024-09-18 | End: 2024-09-18

## 2024-09-18 RX ORDER — ONDANSETRON 4 MG/1
4 TABLET, ORALLY DISINTEGRATING ORAL EVERY 8 HOURS PRN
Qty: 10 TABLET | Refills: 0 | Status: SHIPPED | OUTPATIENT
Start: 2024-09-18 | End: 2024-09-21

## 2024-09-18 RX ADMIN — ONDANSETRON 4 MG: 4 TABLET, ORALLY DISINTEGRATING ORAL at 01:56

## 2024-09-18 ASSESSMENT — COLUMBIA-SUICIDE SEVERITY RATING SCALE - C-SSRS
2. HAVE YOU ACTUALLY HAD ANY THOUGHTS OF KILLING YOURSELF IN THE PAST MONTH?: NO
6. HAVE YOU EVER DONE ANYTHING, STARTED TO DO ANYTHING, OR PREPARED TO DO ANYTHING TO END YOUR LIFE?: NO
1. IN THE PAST MONTH, HAVE YOU WISHED YOU WERE DEAD OR WISHED YOU COULD GO TO SLEEP AND NOT WAKE UP?: NO

## 2024-09-18 ASSESSMENT — ACTIVITIES OF DAILY LIVING (ADL): ADLS_ACUITY_SCORE: 33

## 2024-09-18 NOTE — ED TRIAGE NOTES
Triage Assessment (Adult)       Row Name 09/18/24 0030          Triage Assessment    Airway WDL WDL        Respiratory WDL    Respiratory WDL WDL

## 2024-09-18 NOTE — TELEPHONE ENCOUNTER
Prior Authorization Retail Medication Request    Medication/Dose: EPINEPHrine (ANY BX GENERIC EQUIV) 0.3 MG/0.3ML injection 2-pack   Diagnosis and ICD code (if different than what is on RX):    H/O bee sting allergy [Z91.030]     New/renewal/insurance change PA/secondary ins. PA:  Previously Tried and Failed:  unknown  Rationale:  unknown    Insurance   Primary: [facility]  Insurance ID:  [id number]

## 2024-09-18 NOTE — TELEPHONE ENCOUNTER
Patient called Yes very Frustrated and Confused was able to calm her  down. and will Talk with Ernestine  But Insisted  on appt with Emmanuel Tomorrow scheduled her Tomorrow at 4:10 arrival to discuss Multiple Issues / forms  Also mentioned something about her 14 year old Son??  Jana [facility] Unit Coordinator

## 2024-09-18 NOTE — ED PROVIDER NOTES
"ED Provider Note  [facility]      History     Chief Complaint   Patient presents with    Flu Symptoms     Fever and chills.     HPI  Michelle Joshua is a 33 year old female previous history of aortitis presents to the ED with complaint of nausea, vomiting, diarrhea for the last 5 days.  She states that she is actually feeling improved, but as she was bringing her son in with the same symptoms, the pediatric doctor recommended she be seen as well.  She states that she was told that he has a virus, but he also had respiratory symptoms and had a positive chest x-ray, was told he had pneumonia.  She herself states that she has a little bit of stuffy nose, but no notable respiratory symptoms.  She states that she had had more vomiting and diarrhea, but this is much improved.  She only had 1 episode of nonbloody vomiting today.  Diarrhea is also greatly improved.  No blood in that either.  She states that her temps were in the 99's, nothing higher.  She states that although she is feeling improved, her stomach still feels, \"sour.\"  She states that she is feeling very tired now, really does not want to have labs are any extensive visit, is just hoping to be discharged.  States she is primarily worried about her son because he had a fever and seems to be sicker than her.    Past Medical History  Past Medical History:   Diagnosis Date    Uncomplicated asthma     Unspecified otitis media     Otitis Media as a child     Past Surgical History:   Procedure Laterality Date    HC REMOVE TONSILS/ADENOIDS,<13 Y/O      T & A <11y.o.    ZZHC CREATE EARDRUM OPENING,GEN ANESTH      P.E. Tubes     ondansetron (ZOFRAN ODT) 4 MG ODT tab  albuterol (PROAIR HFA/PROVENTIL HFA/VENTOLIN HFA) 108 (90 Base) MCG/ACT inhaler  atorvastatin (LIPITOR) 20 MG tablet  EPINEPHrine (ANY BX GENERIC EQUIV) 0.3 MG/0.3ML injection 2-pack  fluticasone (FLONASE) 50 MCG/ACT nasal spray  fluticasone (FLOVENT HFA) 220 MCG/ACT " inhaler  folic acid (FOLVITE) 1 MG tablet  HUMIRA *CF* PEN 40 MG/0.4ML pen kit  loratadine (CLARITIN) 10 MG tablet  losartan (COZAAR) 50 MG tablet  methotrexate 2.5 MG tablet  predniSONE (DELTASONE) 10 MG tablet      Allergies   Allergen Reactions    Aspirin Cough     Other Reaction(s): Runny Nose    Acetaminophen     Amoxicillin Hives    Amoxicillin-Pot Clavulanate Hives    Bees     Pcn [Penicillins]     Sulfa Antibiotics Hives    Sulfasalazine      Family History  Family History   Problem Relation Age of Onset    Hypertension Mother     Anxiety Disorder Mother     Depression Mother     Depression Sister     Anxiety Disorder Sister     Mental Illness Sister         bipolar     Social History   Social History     Tobacco Use    Smoking status: Every Day     Current packs/day: 0.50     Types: Cigarettes    Smokeless tobacco: Never    Tobacco comments:     sometimes   Vaping Use    Vaping status: Some Days   Substance Use Topics    Alcohol use: Yes     Comment: occaionally.    Drug use: No      A medically appropriate review of systems was performed with pertinent positives and negatives noted in the HPI, and all other systems negative.    Physical Exam   BP: (!) 168/89  Pulse: 117  Temp: 98.5  F (36.9  C)  Resp: 16  Height: 152.4 cm (5')  Weight: 66 kg (145 lb 8 oz)  SpO2: 99 %  Physical Exam  Constitutional:       General: She is not in acute distress.     Appearance: Normal appearance. She is not toxic-appearing.   HENT:      Head: Atraumatic.      Mouth/Throat:      Mouth: Mucous membranes are moist.   Eyes:      General: No scleral icterus.     Conjunctiva/sclera: Conjunctivae normal.   Cardiovascular:      Rate and Rhythm: Tachycardia present.      Heart sounds: Normal heart sounds.      Comments: Mild tachycardia  Pulmonary:      Effort: No respiratory distress.      Breath sounds: Normal breath sounds.   Abdominal:      Palpations: Abdomen is soft.      Tenderness: There is no abdominal tenderness.    Musculoskeletal:         General: No deformity.      Cervical back: Neck supple.   Skin:     General: Skin is warm.      Findings: No rash.   Neurological:      Mental Status: She is alert.           ED Course, Procedures, & Data      Procedures              No results found for any visits on 09/18/24.  Medications   ondansetron (ZOFRAN ODT) ODT tab 4 mg (4 mg Oral $Given 9/18/24 0156)     Labs Ordered and Resulted from Time of ED Arrival to Time of ED Departure - No data to display  No orders to display          Critical care was not performed.     Medical Decision Making  The patient's presentation was of moderate complexity (an acute illness with systemic symptoms).    The patient's evaluation involved:  review of external note(s) from 1 sources (previous note)    The patient's management necessitated moderate risk (prescription drug management including medications given in the ED).    Assessment & Plan    Patient states she does not want any fluids or labs-is feeling like she just wants to go home at this point as she is tired and has been a long day.  I did give a dose of Zofran here, will give a prescription.  She states that honestly her symptoms are much improved compared with a few days ago.  She is chronically immunosuppressed, but has not had any true fevers.  She is mildly tachycardic, but again, states she is feeling much improved.  She states she actually does have an appointment scheduled for tomorrow already with her clinic doctor, will plan to follow-up.  She is encouraged to return if she changes her mind or has any new or worsening symptoms, any other concerns.  Her abdominal exam is completely benign.  She verbalizes understanding and is agreeable to the plan.    Dictation Disclaimer: Some of this Note has been completed with voice-recognition dictation software. Although errors are generally corrected real-time, there is the potential for a rare error to be present in the completed  chart.      I have reviewed the nursing notes. I have reviewed the findings, diagnosis, plan and need for follow up with the patient.    New Prescriptions    ONDANSETRON (ZOFRAN ODT) 4 MG ODT TAB    Take 1 tablet (4 mg) by mouth every 8 hours as needed for nausea.       Final diagnoses:   Nausea and vomiting, unspecified vomiting type   Diarrhea, unspecified type       Lona MENDEZ [facility] EMERGENCY DEPARTMENT  9/18/2024     Lona Harrison MD  09/18/24 0203

## 2024-09-18 NOTE — DISCHARGE INSTRUCTIONS
Drink small amounts of fluids frequently. Eat a light diet until feeling better. Return with any worsening or concerns. Follow up with your clinic doctor tomorrow as planned.

## 2024-09-18 NOTE — TELEPHONE ENCOUNTER
Patient spoke with rooming staff regarding appointment with FS this afternoon.  Rooming team let RN know patient confused and frustrated that appointment not with JS.  Patient wondering if JS is available for appointment today, as needs form completed.    Huddled with FS- willing to complete form for short term leave.  RN called patient to provide update: JS not available today, but FS willing to complete visit.    If patient prefers to see JS for this, would need to reschedule visit.    Left message for patient to call [facility] back  When patient calls back please transfer to an RN  Torrie JIMENEZ RN

## 2024-09-19 ENCOUNTER — TELEPHONE (OUTPATIENT)
Dept: RHEUMATOLOGY | Facility: CLINIC | Age: 34
End: 2024-09-19

## 2024-09-19 ENCOUNTER — OFFICE VISIT (OUTPATIENT)
Dept: FAMILY MEDICINE | Facility: CLINIC | Age: 34
End: 2024-09-19
Payer: COMMERCIAL

## 2024-09-19 VITALS
RESPIRATION RATE: 16 BRPM | HEART RATE: 100 BPM | TEMPERATURE: 97.9 F | OXYGEN SATURATION: 98 % | BODY MASS INDEX: 27.93 KG/M2 | SYSTOLIC BLOOD PRESSURE: 166 MMHG | WEIGHT: 143 LBS | DIASTOLIC BLOOD PRESSURE: 75 MMHG

## 2024-09-19 DIAGNOSIS — J45.31 MILD PERSISTENT ASTHMA WITH ACUTE EXACERBATION: ICD-10-CM

## 2024-09-19 DIAGNOSIS — I77.6 AORTITIS (H): ICD-10-CM

## 2024-09-19 DIAGNOSIS — H66.002 NON-RECURRENT ACUTE SUPPURATIVE OTITIS MEDIA OF LEFT EAR WITHOUT SPONTANEOUS RUPTURE OF TYMPANIC MEMBRANE: Primary | ICD-10-CM

## 2024-09-19 DIAGNOSIS — I10 HYPERTENSION, UNSPECIFIED TYPE: ICD-10-CM

## 2024-09-19 PROCEDURE — 99214 OFFICE O/P EST MOD 30 MIN: CPT | Performed by: PHYSICIAN ASSISTANT

## 2024-09-19 RX ORDER — LOSARTAN POTASSIUM 50 MG/1
50 TABLET ORAL DAILY
Qty: 90 TABLET | Refills: 1 | Status: SHIPPED | OUTPATIENT
Start: 2024-09-19

## 2024-09-19 RX ORDER — PREDNISONE 10 MG/1
10 TABLET ORAL DAILY
Qty: 10 TABLET | Refills: 0 | Status: SHIPPED | OUTPATIENT
Start: 2024-09-19

## 2024-09-19 RX ORDER — AZITHROMYCIN 250 MG/1
TABLET, FILM COATED ORAL
Qty: 6 TABLET | Refills: 0 | Status: SHIPPED | OUTPATIENT
Start: 2024-09-19

## 2024-09-19 RX ORDER — ATORVASTATIN CALCIUM 20 MG/1
20 TABLET, FILM COATED ORAL DAILY
Qty: 90 TABLET | Refills: 1 | Status: SHIPPED | OUTPATIENT
Start: 2024-09-19

## 2024-09-19 ASSESSMENT — ASTHMA QUESTIONNAIRES
QUESTION_5 LAST FOUR WEEKS HOW WOULD YOU RATE YOUR ASTHMA CONTROL: POORLY CONTROLLED
QUESTION_4 LAST FOUR WEEKS HOW OFTEN HAVE YOU USED YOUR RESCUE INHALER OR NEBULIZER MEDICATION (SUCH AS ALBUTEROL): ONE OR TWO TIMES PER DAY
ACT_TOTALSCORE: 11
ACT_TOTALSCORE: 11
QUESTION_1 LAST FOUR WEEKS HOW MUCH OF THE TIME DID YOUR ASTHMA KEEP YOU FROM GETTING AS MUCH DONE AT WORK, SCHOOL OR AT HOME: SOME OF THE TIME
QUESTION_3 LAST FOUR WEEKS HOW OFTEN DID YOUR ASTHMA SYMPTOMS (WHEEZING, COUGHING, SHORTNESS OF BREATH, CHEST TIGHTNESS OR PAIN) WAKE YOU UP AT NIGHT OR EARLIER THAN USUAL IN THE MORNING: FOUR OR MORE NIGHTS A WEEK
QUESTION_2 LAST FOUR WEEKS HOW OFTEN HAVE YOU HAD SHORTNESS OF BREATH: THREE TO SIX TIMES A WEEK

## 2024-09-19 ASSESSMENT — PATIENT HEALTH QUESTIONNAIRE - PHQ9
SUM OF ALL RESPONSES TO PHQ QUESTIONS 1-9: 7
SUM OF ALL RESPONSES TO PHQ QUESTIONS 1-9: 7
10. IF YOU CHECKED OFF ANY PROBLEMS, HOW DIFFICULT HAVE THESE PROBLEMS MADE IT FOR YOU TO DO YOUR WORK, TAKE CARE OF THINGS AT HOME, OR GET ALONG WITH OTHER PEOPLE: SOMEWHAT DIFFICULT

## 2024-09-19 NOTE — PROGRESS NOTES
Assessment & Plan     Non-recurrent acute suppurative otitis media of left ear without spontaneous rupture of tympanic membrane  Is feeling a bit better from last night ER.  Will treat ears based on exam.    - azithromycin (ZITHROMAX) 250 MG tablet; Two tablets first day, then one tablet daily for four days.    Aortitis (H24)  Has follow up with rheumatology upcoming, plans to talk to their nurse about bridge of meds until then.  Forms filled out for FMLA  - atorvastatin (LIPITOR) 20 MG tablet; Take 1 tablet (20 mg) by mouth daily.  - losartan (COZAAR) 50 MG tablet; Take 1 tablet (50 mg) by mouth daily.    Mild persistent asthma with acute exacerbation    - predniSONE (DELTASONE) 10 MG tablet; Take 1 tablet (10 mg) by mouth daily.    Hypertension, unspecified type  Not at goal, but has not been on meds in quite some time          BMI  Estimated body mass index is 27.93 kg/m  as calculated from the following:    Height as of 9/18/24: 1.524 m (5').    Weight as of this encounter: 64.9 kg (143 lb).             Mechelle Martinez is a 33 year old, presenting for the following health issues:  History of Present Illness        9/19/2024     4:36 PM   Additional Questions   Roomed by Africa QUIROZ MA   Accompanied by self         9/19/2024     4:36 PM   Patient Reported Additional Medications   Patient reports taking the following new medications none     History of Present Illness       Reason for visit:  Health She is missing 7 dose(s) of medications per week.           Review of Systems  Constitutional, HEENT, cardiovascular, pulmonary, gi and gu systems are negative, except as otherwise noted.      Objective    BP (!) 166/75   Pulse 100   Temp 97.9  F (36.6  C) (Temporal)   Resp 16   Wt 64.9 kg (143 lb)   LMP 09/18/2024   SpO2 98%   BMI 27.93 kg/m    Body mass index is 27.93 kg/m .  Physical Exam   GENERAL: alert and no distress  EYES: Eyes grossly normal to inspection  HENT: normal cephalic/atraumatic, right ear:  normal: no effusions, no erythema, normal landmarks, left ear: erythematous, nose and mouth without ulcers or lesions, oropharynx clear, and oral mucous membranes moist  RESP: expiratory wheezes throughout  CV: tachycardia and normal S1 S2, no S3 or S4            Signed Electronically by: Bimal Pearl PA-C

## 2024-09-19 NOTE — TELEPHONE ENCOUNTER
Pt is scheduled for appt on 10/1. She is requesting medication refills for Prednisone, methotrexate, and other pt reported meds in her medication list. It would be beneficial for her to speak w/ a rheumatology nurse to discuss medications.

## 2024-09-20 ENCOUNTER — TELEPHONE (OUTPATIENT)
Dept: FAMILY MEDICINE | Facility: CLINIC | Age: 34
End: 2024-09-20
Payer: COMMERCIAL

## 2024-09-20 NOTE — TELEPHONE ENCOUNTER
Forms from [facility]  completed and signed by DIEGO  Faxed to [phone] and sent for scanning  Jana [facility] Unit Coordinator

## 2024-09-20 NOTE — TELEPHONE ENCOUNTER
Form completed and signed faxed and sent for scanning  Jana [facility] Unit Coordinator

## 2024-09-21 NOTE — TELEPHONE ENCOUNTER
Retail Pharmacy Prior Authorization Team   Phone: [phone]    PA Initiation    Medication: EPINEPHrine (ANY BX GENERIC EQUIV) 0.3 MG/0.3ML injection 2-pack   Insurance Company: [employer] - Phone [phone] Fax [phone]  Pharmacy Filling the Rx: [id number] - [place], [place] - [address] AT [facility] OF  [address]  Filling Pharmacy Phone: [phone]  Filling Pharmacy Fax: [phone]  Start Date: 9/21/2024

## 2024-09-23 NOTE — TELEPHONE ENCOUNTER
Prior Authorization Approval    Authorization Effective Date: 9/22/2024  Authorization Expiration Date: 9/22/2025  Medication: EPINEPHrine (ANY BX GENERIC EQUIV) 0.3 MG/0.3ML injection 2-pack  - APPROVED  Approved Dose/Quantity:    Reference #:     Insurance Company: [employer] - Phone [phone] Fax [phone]  Expected CoPay:       CoPay Card Available:      Foundation Assistance Needed:    Which Pharmacy is filling the prescription (Not needed for infusion/clinic administered): [id number] [place] - [address] [place] [address] AT [facility] [address]  Pharmacy Notified:  YES  Patient Notified:  YES

## 2024-10-01 ENCOUNTER — OFFICE VISIT (OUTPATIENT)
Dept: RHEUMATOLOGY | Facility: CLINIC | Age: 34
End: 2024-10-01
Attending: PHYSICIAN ASSISTANT
Payer: COMMERCIAL

## 2024-10-01 ENCOUNTER — LAB (OUTPATIENT)
Dept: LAB | Facility: CLINIC | Age: 34
End: 2024-10-01
Payer: COMMERCIAL

## 2024-10-01 ENCOUNTER — TELEPHONE (OUTPATIENT)
Dept: VASCULAR SURGERY | Facility: CLINIC | Age: 34
End: 2024-10-01
Payer: COMMERCIAL

## 2024-10-01 VITALS
BODY MASS INDEX: 28.12 KG/M2 | OXYGEN SATURATION: 97 % | HEART RATE: 103 BPM | WEIGHT: 144 LBS | DIASTOLIC BLOOD PRESSURE: 85 MMHG | SYSTOLIC BLOOD PRESSURE: 162 MMHG

## 2024-10-01 DIAGNOSIS — M31.8 SYSTEMIC VASCULITIS (H): ICD-10-CM

## 2024-10-01 DIAGNOSIS — Z11.1 TUBERCULOSIS SCREENING: ICD-10-CM

## 2024-10-01 DIAGNOSIS — I77.6 AORTITIS (H): ICD-10-CM

## 2024-10-01 DIAGNOSIS — Z11.59 NEED FOR HEPATITIS C SCREENING TEST: ICD-10-CM

## 2024-10-01 DIAGNOSIS — Z11.59 NEED FOR HEPATITIS B SCREENING TEST: ICD-10-CM

## 2024-10-01 DIAGNOSIS — R20.2 TINGLING: ICD-10-CM

## 2024-10-01 DIAGNOSIS — Z11.59 NEED FOR HEPATITIS B SCREENING TEST: Primary | ICD-10-CM

## 2024-10-01 DIAGNOSIS — R06.02 SOB (SHORTNESS OF BREATH): ICD-10-CM

## 2024-10-01 DIAGNOSIS — H57.89 RED EYE: ICD-10-CM

## 2024-10-01 LAB
ALBUMIN UR-MCNC: NEGATIVE MG/DL
APPEARANCE UR: CLEAR
BACTERIA #/AREA URNS HPF: ABNORMAL /HPF
BASOPHILS # BLD AUTO: 0 10E3/UL (ref 0–0.2)
BASOPHILS NFR BLD AUTO: 0 %
BILIRUB UR QL STRIP: NEGATIVE
COLOR UR AUTO: YELLOW
EOSINOPHIL # BLD AUTO: 0.1 10E3/UL (ref 0–0.7)
EOSINOPHIL NFR BLD AUTO: 1 %
ERYTHROCYTE [DISTWIDTH] IN BLOOD BY AUTOMATED COUNT: 14.7 % (ref 10–15)
ERYTHROCYTE [SEDIMENTATION RATE] IN BLOOD BY WESTERGREN METHOD: 20 MM/HR (ref 0–20)
GLUCOSE UR STRIP-MCNC: NEGATIVE MG/DL
HCT VFR BLD AUTO: 39.9 % (ref 35–47)
HGB BLD-MCNC: 13.8 G/DL (ref 11.7–15.7)
HGB UR QL STRIP: NEGATIVE
IMM GRANULOCYTES # BLD: 0 10E3/UL
IMM GRANULOCYTES NFR BLD: 0 %
KETONES UR STRIP-MCNC: ABNORMAL MG/DL
LEUKOCYTE ESTERASE UR QL STRIP: NEGATIVE
LYMPHOCYTES # BLD AUTO: 4 10E3/UL (ref 0.8–5.3)
LYMPHOCYTES NFR BLD AUTO: 46 %
MCH RBC QN AUTO: 32.8 PG (ref 26.5–33)
MCHC RBC AUTO-ENTMCNC: 34.6 G/DL (ref 31.5–36.5)
MCV RBC AUTO: 95 FL (ref 78–100)
MONOCYTES # BLD AUTO: 0.3 10E3/UL (ref 0–1.3)
MONOCYTES NFR BLD AUTO: 4 %
MUCOUS THREADS #/AREA URNS LPF: PRESENT /LPF
NEUTROPHILS # BLD AUTO: 4.2 10E3/UL (ref 1.6–8.3)
NEUTROPHILS NFR BLD AUTO: 49 %
NITRATE UR QL: NEGATIVE
PH UR STRIP: 6 [PH] (ref 5–7)
PLATELET # BLD AUTO: 324 10E3/UL (ref 150–450)
RBC # BLD AUTO: 4.21 10E6/UL (ref 3.8–5.2)
RBC #/AREA URNS AUTO: ABNORMAL /HPF
SP GR UR STRIP: 1.02 (ref 1–1.03)
SQUAMOUS #/AREA URNS AUTO: ABNORMAL /LPF
UROBILINOGEN UR STRIP-ACNC: 0.2 E.U./DL
WBC # BLD AUTO: 8.6 10E3/UL (ref 4–11)
WBC #/AREA URNS AUTO: ABNORMAL /HPF

## 2024-10-01 PROCEDURE — G0463 HOSPITAL OUTPT CLINIC VISIT: HCPCS | Performed by: STUDENT IN AN ORGANIZED HEALTH CARE EDUCATION/TRAINING PROGRAM

## 2024-10-01 PROCEDURE — 83516 IMMUNOASSAY NONANTIBODY: CPT

## 2024-10-01 PROCEDURE — 82550 ASSAY OF CK (CPK): CPT

## 2024-10-01 PROCEDURE — 82565 ASSAY OF CREATININE: CPT

## 2024-10-01 PROCEDURE — 86803 HEPATITIS C AB TEST: CPT

## 2024-10-01 PROCEDURE — 87340 HEPATITIS B SURFACE AG IA: CPT

## 2024-10-01 PROCEDURE — 85025 COMPLETE CBC W/AUTO DIFF WBC: CPT

## 2024-10-01 PROCEDURE — 85652 RBC SED RATE AUTOMATED: CPT

## 2024-10-01 PROCEDURE — 87389 HIV-1 AG W/HIV-1&-2 AB AG IA: CPT

## 2024-10-01 PROCEDURE — 36415 COLL VENOUS BLD VENIPUNCTURE: CPT

## 2024-10-01 PROCEDURE — G2211 COMPLEX E/M VISIT ADD ON: HCPCS | Performed by: STUDENT IN AN ORGANIZED HEALTH CARE EDUCATION/TRAINING PROGRAM

## 2024-10-01 PROCEDURE — 84155 ASSAY OF PROTEIN SERUM: CPT

## 2024-10-01 PROCEDURE — 99000 SPECIMEN HANDLING OFFICE-LAB: CPT

## 2024-10-01 PROCEDURE — 86704 HEP B CORE ANTIBODY TOTAL: CPT

## 2024-10-01 PROCEDURE — 81001 URINALYSIS AUTO W/SCOPE: CPT

## 2024-10-01 PROCEDURE — 83876 ASSAY MYELOPEROXIDASE: CPT

## 2024-10-01 PROCEDURE — 86481 TB AG RESPONSE T-CELL SUSP: CPT

## 2024-10-01 PROCEDURE — 82787 IGG 1 2 3 OR 4 EACH: CPT

## 2024-10-01 PROCEDURE — 86706 HEP B SURFACE ANTIBODY: CPT

## 2024-10-01 PROCEDURE — 84460 ALANINE AMINO (ALT) (SGPT): CPT

## 2024-10-01 PROCEDURE — 99205 OFFICE O/P NEW HI 60 MIN: CPT | Performed by: STUDENT IN AN ORGANIZED HEALTH CARE EDUCATION/TRAINING PROGRAM

## 2024-10-01 PROCEDURE — 86140 C-REACTIVE PROTEIN: CPT

## 2024-10-01 PROCEDURE — 84450 TRANSFERASE (AST) (SGOT): CPT

## 2024-10-01 PROCEDURE — 87385 HISTOPLASMA CAPSUL AG IA: CPT | Mod: 90

## 2024-10-01 PROCEDURE — 82784 ASSAY IGA/IGD/IGG/IGM EACH: CPT

## 2024-10-01 PROCEDURE — 82784 ASSAY IGA/IGD/IGG/IGM EACH: CPT | Mod: 59

## 2024-10-01 PROCEDURE — 84165 PROTEIN E-PHORESIS SERUM: CPT | Performed by: PATHOLOGY

## 2024-10-01 ASSESSMENT — PAIN SCALES - GENERAL: PAINLEVEL: NO PAIN (0)

## 2024-10-01 NOTE — NURSING NOTE
Chief Complaint   Patient presents with    Consult     .BP (!) 163/73 (BP Location: Left arm, Patient Position: Sitting, Cuff Size: Adult Regular)   Pulse 103   Wt 65.3 kg (144 lb)   LMP 09/18/2024   SpO2 97%   BMI 28.12 kg/m    Mar Pagan [facility]

## 2024-10-01 NOTE — LETTER
10/1/2024       RE: Michelle Joshua  [address]  [facility] [address]     Dear Colleague,    Thank you for referring your patient, Michelle Joshua, to the [facility] RHEUMATOLOGY at [facility]. Please see a copy of my visit note below.    RHEUMATOLOGY OUTPATIENT CLINIC NOTE     Referring Provider: Bimal Pearl PA-C  [address]  [place] [address]    Lab review     RF:  Positive, 2022  CCP Ab:  Negative      TITA: Positive, 1: 160, homogenous  dsDNA: negative     Complement C3: normal  Complement C4: normal    ANCA by IFA: Negative     MPO:  Pending       RI-3 Abs:  Pending    Imaging review     X-ray hands, 11/2014: No erosive disease    X-ray feet, 11/2014: No erosive change    CTA chest, abdomen, pelvis with contrast, 3/2023:  1. Thickening of the suprarenal and proximal infrarenal aortic wall and periaortic thickening/straning in the distal abdominal aorta consistent with aortitis.  2. Focal irregular enlargement of the ascending aorta and descending thoracic aorta. Maximum ascending aorta diameters are 43 x 42 mm with area/height ratio of 9.3 cm2/m. Maximum descending thoracic aorta diameters are 36 x 32 mm.  3. Moderate stenosis in the proximal inferior mesenteric artery.  4. Patent iliac and common femoral arteries.  5. Compared to prior study on 12/16/22: Maximum ascending aorta diameters have increased. Distal periaortic wall thickening and soft tissue stranding appears more prominent.    CT chest, abdomen, pelvis with contrast, 3/2022:   1. Prominent aorta thickening identified, noted of the distal ascending aorta throughout the aortic arch, descending thoracic aorta, and ending at the level of the renal arteries c/w aortitis.   A. The maximal thickening is at the level or just below the level of the diaphragm at 8-9 mm.   B. There is aneurysmal dilatation in the mid descending thoracic aorta up to 3.7 cm.   C.  There is irregular aortitis-type change extending into the base of the brachiocephalic and left subclavian vessels.   D. No other branch vessels were definitely involved with aortitis changes. In-flow vessels appear normal in thickness and renal arteries are  widely patent.    CT abdomen pelvis with contrast, 1/2017:  No acute changes in the abdomen or pelvis to account for patient's symptoms. No bowel obstruction or diverticulitis. Appendix is normal.      Subjective    Visit date October 1, 2024    Michelle is a 33 year old female who presents today for consult.     She had chest pain in February 2022, she presented to the ER in [facility], found to have aortitis on imaging, She was evaluated by vascular specialist Dr. Peacock and was started on prednisone 40 mg and then on follow up methotrexate 10 mg weekly was introduced along with aspirin.     She was referred to rheumatologist Dr. Gregorio, She was still on prednisone and methotrexate that was increased to 25 mg and initiation of humira every 2 weeks which was pursued in mid 2023. She remained on methotrexate 25 mg weekly and humira every 2 weeks since then.  There has been some gaps and She is not sure how much prednisone she was taking afterwards. She had some gaps in getting methotrexate and humira as well.     Per patient, she was last seen by Dr. Gregorio in March 2024 and Dr. Peacock sometime in 2024 though there are no records available for visits. There are no imaging available since last CT in March 2023 done at [facility] though Marcela mentioned that she had CT imaging done in 2024.     Today, Michelle mentioned the following:    She has been having exertional shortness of breath, cough with sputum production, no blood, She had recent pneumonia about 2 weeks, treated with antibiotics. She has history of asthma since young age and using nebulizer. She is on Albuteral and flovent. She was not evaluated by pulmonary for her symptoms.    She had bilateral  ear infection 2 weeks ago and treated with antibiotics, She is planned to see ENT. Denies history of acute hearing loss.     She has recurrent eye redness and occasional photophobia, not evaluated by Ophthalmology previously, no steorid eye drops used.     Denies skin rash    She experienced tingling and numbness in both legs, starting in feet up to knees. This has been worsening over the past months. No foot drop.     Denies symptoms of inflammatory arthritis   Denies sicca symptoms, swelling in parotid glands. History of pancreatitis     Denies arm claudications with activities  Denies leg claudications with exercise   Denies abdominal pain with eating, bloating  Denies chronic abdominal pain, blood in stool, diarrhea  Denies weight loss over the past 6 months   Denies fever,chills     She is active smoker, since age of 19, about 1/2 pack per day  Drinks around 2 alcohol drinks per week   Denies recreational drug use, or cocaine use     Mother and grand mother with rheumatoid arthritis     ROS    Current Medications   Methotrexate 25 mg weekly (Sundays)  Folic acid     Past Medical history   Past Medical History:   Diagnosis Date     Uncomplicated asthma      Unspecified otitis media     Otitis Media as a child       Objective  BP (!) 162/85 (BP Location: Left arm)   Pulse 103   Wt 65.3 kg (144 lb)   LMP 09/18/2024   SpO2 97%   BMI 28.12 kg/m        PHYSICAL EXAMINATION  Physical Exam  HENT:      Head: Normocephalic.      Mouth/Throat:      Mouth: Mucous membranes are moist.   Eyes:      Conjunctiva/sclera: Conjunctivae normal.   Cardiovascular:      Comments: Pulse: DP and PT palpable  Pulmonary:      Effort: Pulmonary effort is normal.      Breath sounds: Normal breath sounds.   Musculoskeletal:         General: No swelling or tenderness.      Cervical back: Normal range of motion.      Comments: No noticeable swelling in the hands (MCP, PIP, DIP), wrists, elbows, knees, ankles, feet.  Range of motion in  the shoulders and hips are within accepted range for age.    Skin:     Findings: No rash.   Neurological:      Mental Status: She is alert.           Assessment & Plan    # Large vessel vasculitis  # Aortitis  # Radiographic thickening aorta [ascending, descending thoracic, abdominal]  # Radiographic mild ectasia left subclavian  # Radiographic thickening proximal inferior mesenteric  # Exertional shortness of breath, history of asthma  # Recurrent ear infections  # Recurrent eye redness with photophobia  # Lower extremity tingling and numbness  # Family history of autoimmunity  # Chronic immunosuppression  # Screening for chronic infections  # High risk medication requiring frequent lab tests for toxicity monitoring  # Longitudinal care        # Large vessel vasculitis  # Aortitis  # Radiographic thickening aorta [ascending, descending thoracic, abdominal]  # Radiographic mild ectasia left subclavian  # Radiographic thickening proximal inferior mesenteric    Michelle is referred to rheumatology for evaluation of aortitis for concerns of Takayasu arteritis.     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  She was treated with prednisone, methotrexate and Humira and seems like a follow-up CT imaging based on report in March 2023 there seems to have been increase in ascending aorta diameter and more prominence in the distal aorta. There seems to have been some intervals where she was not taking methotrexate or Humira and she has been intermittently on prednisone.    Her clinical picture is consistent with large vessel vasculitis that can be classified as Takaysu arteritis.    Nonetheless, she has additional features including exertional shortness of breath, recurrent ear  infections, eye redness for which additional evaluation would be warranted for other mimickers including ANCA vasculitis, IgG4 related disease.  Chronic infections will also need to be evaluated including tuberculosis, HIV and syphilitic aortitis.    We discussed getting updated labs including inflammatory markers, additional autoimmune serology as well as CTA of her arterial tree in comparison with old images if possible.    She has been recently treated for ear infection, after resolution of infection then we can resume methotrexate and Humira to control her extensive large vessel vasculitis.    Plan:  - Get updated labs including inflammatory markers, urinalysis with microscopy  - Check ANCA serology, immunoglobulin level and IgG subclasses  - Serum IgE, complement C3 and C4 for workup of IgG4 related disease  - Get updated CTA of the arterial tree [chest abdomen and pelvis for monitoring] and [CTA head and neck to evaluate for disease]  - Echocardiogram    If there are no features of infection then we will resume methotrexate and humira     # Exertional shortness of breath, history of asthma  Pulmonary consultation.  Check ANCA serology. IgE levels   No reported parenchymal lung disease on CT imaging    # Recurrent ear infections  Agree with ENT referral recommended by primary care provider    # Recurrent eye redness with photophobia  Ophthalmology referral to rule out uveitis    # Lower extremity tingling and numbness  Neurology consultation    # Chronic immunosuppression  # Screening for chronic infections  Plan:  Check hepatitis B profile, C, QuantiFERON gold    # High risk medication requiring frequent lab tests for toxicity monitoring  History of methotrexate and Humira use  Plan:   Get updated labs  MTM pharmacy referral for counseling  Standing orders for monitoring      # Longitudinal care  The longitudinal plan of care for the diagnosis(es)/condition(s) as documented were addressed during this visit.  Due to the added complexity in care, I will continue to support Michelle in the subsequent management and with ongoing continuity of care.      73 minutes spent by me on the date of the encounter doing chart review, history and exam, documentation and further activities per the note      Return in about 5 weeks (around 11/5/2024) for Follow up.    René Stock MD  [facility] RHEUMATOLOGY      Again, thank you for allowing me to participate in the care of your patient.      Sincerely,    René Stock MD

## 2024-10-01 NOTE — Clinical Note
Can you please get her old CT scans from [facility] since 2/2022 be uploaded in our system to radiology use it for comparison with the new CT scan she is scheduled to get. Thank you

## 2024-10-01 NOTE — TELEPHONE ENCOUNTER
"Left Voicemail (1st Attempt) and Sent Mychart (1st Attempt) for the patient to call back and schedule the following:    [place] [facility]  Provider: Dr. Bonifacio Romo  Appointment type: New Vascular Patient  Appointment mode: In Person  Return date: First available     Specialty phone number: [phone] or [phone]    Referred to Vascular [facility] [place]: Yes  [facility] [place]   Phone number: [phone]    Additional Notes:Scheduling per referral notes (Referred by Bimal Pearl PA-C),   \"DONE 10/1: schedule cirilo Romo, next avail. [place] \"    -Frances Bird on 10/1/2024 at 10:30 AM        "

## 2024-10-01 NOTE — PROGRESS NOTES
RHEUMATOLOGY OUTPATIENT CLINIC NOTE     Referring Provider: Bimal Pearl PA-C  [address]  [place] [address]    Lab review     RF:  Positive, 2022  CCP Ab:  Negative      TITA: Positive, 1: 160, homogenous  dsDNA: negative     Complement C3: normal  Complement C4: normal    ANCA by IFA: Negative     MPO:  Pending       KS-3 Abs:  Pending    Imaging review     X-ray hands, 11/2014: No erosive disease    X-ray feet, 11/2014: No erosive change    CTA chest, abdomen, pelvis with contrast, 3/2023:  1. Thickening of the suprarenal and proximal infrarenal aortic wall and periaortic thickening/straning in the distal abdominal aorta consistent with aortitis.  2. Focal irregular enlargement of the ascending aorta and descending thoracic aorta. Maximum ascending aorta diameters are 43 x 42 mm with area/height ratio of 9.3 cm2/m. Maximum descending thoracic aorta diameters are 36 x 32 mm.  3. Moderate stenosis in the proximal inferior mesenteric artery.  4. Patent iliac and common femoral arteries.  5. Compared to prior study on 12/16/22: Maximum ascending aorta diameters have increased. Distal periaortic wall thickening and soft tissue stranding appears more prominent.    CT chest, abdomen, pelvis with contrast, 3/2022:   1. Prominent aorta thickening identified, noted of the distal ascending aorta throughout the aortic arch, descending thoracic aorta, and ending at the level of the renal arteries c/w aortitis.   A. The maximal thickening is at the level or just below the level of the diaphragm at 8-9 mm.   B. There is aneurysmal dilatation in the mid descending thoracic aorta up to 3.7 cm.   C. There is irregular aortitis-type change extending into the base of the brachiocephalic and left subclavian vessels.   D. No other branch vessels were definitely involved with aortitis changes. In-flow vessels appear normal in thickness and renal arteries are  widely patent.    CT abdomen pelvis with  contrast, 1/2017:  No acute changes in the abdomen or pelvis to account for patient's symptoms. No bowel obstruction or diverticulitis. Appendix is normal.      Subjective     Visit date October 1, 2024    Michelle is a 33 year old female who presents today for consult.     She had chest pain in February 2022, she presented to the ER in [facility], found to have aortitis on imaging, She was evaluated by vascular specialist Dr. Peacock and was started on prednisone 40 mg and then on follow up methotrexate 10 mg weekly was introduced along with aspirin.     She was referred to rheumatologist Dr. Gregorio, She was still on prednisone and methotrexate that was increased to 25 mg and initiation of humira every 2 weeks which was pursued in mid 2023. She remained on methotrexate 25 mg weekly and humira every 2 weeks since then.  There has been some gaps and She is not sure how much prednisone she was taking afterwards. She had some gaps in getting methotrexate and humira as well.     Per patient, she was last seen by Dr. Gregorio in March 2024 and Dr. Peacock sometime in 2024 though there are no records available for visits. There are no imaging available since last CT in March 2023 done at [facility] though Marcela mentioned that she had CT imaging done in 2024.     Today, Michelle mentioned the following:    She has been having exertional shortness of breath, cough with sputum production, no blood, She had recent pneumonia about 2 weeks, treated with antibiotics. She has history of asthma since young age and using nebulizer. She is on Albuteral and flovent. She was not evaluated by pulmonary for her symptoms.    She had bilateral ear infection 2 weeks ago and treated with antibiotics, She is planned to see ENT. Denies history of acute hearing loss.     She has recurrent eye redness and occasional photophobia, not evaluated by Ophthalmology previously, no steorid eye drops used.     Denies skin rash    She experienced  tingling and numbness in both legs, starting in feet up to knees. This has been worsening over the past months. No foot drop.     Denies symptoms of inflammatory arthritis   Denies sicca symptoms, swelling in parotid glands. History of pancreatitis     Denies arm claudications with activities  Denies leg claudications with exercise   Denies abdominal pain with eating, bloating  Denies chronic abdominal pain, blood in stool, diarrhea  Denies weight loss over the past 6 months   Denies fever,chills     She is active smoker, since age of 19, about 1/2 pack per day  Drinks around 2 alcohol drinks per week   Denies recreational drug use, or cocaine use     Mother and grand mother with rheumatoid arthritis     ROS    Current Medications   Methotrexate 25 mg weekly (Sundays)  Folic acid     Past Medical history   Past Medical History:   Diagnosis Date    Uncomplicated asthma     Unspecified otitis media     Otitis Media as a child       Objective   BP (!) 162/85 (BP Location: Left arm)   Pulse 103   Wt 65.3 kg (144 lb)   LMP 09/18/2024   SpO2 97%   BMI 28.12 kg/m        PHYSICAL EXAMINATION  Physical Exam  HENT:      Head: Normocephalic.      Mouth/Throat:      Mouth: Mucous membranes are moist.   Eyes:      Conjunctiva/sclera: Conjunctivae normal.   Cardiovascular:      Comments: Pulse: DP and PT palpable  Pulmonary:      Effort: Pulmonary effort is normal.      Breath sounds: Normal breath sounds.   Musculoskeletal:         General: No swelling or tenderness.      Cervical back: Normal range of motion.      Comments: No noticeable swelling in the hands (MCP, PIP, DIP), wrists, elbows, knees, ankles, feet.  Range of motion in the shoulders and hips are within accepted range for age.    Skin:     Findings: No rash.   Neurological:      Mental Status: She is alert.           Assessment & Plan     # Large vessel vasculitis  # Aortitis  # Radiographic thickening aorta [ascending, descending thoracic, abdominal]  #  Radiographic mild ectasia left subclavian  # Radiographic thickening proximal inferior mesenteric  # Exertional shortness of breath, history of asthma  # Recurrent ear infections  # Recurrent eye redness with photophobia  # Lower extremity tingling and numbness  # Family history of autoimmunity  # Chronic immunosuppression  # Screening for chronic infections  # High risk medication requiring frequent lab tests for toxicity monitoring  # Longitudinal care        # Large vessel vasculitis  # Aortitis  # Radiographic thickening aorta [ascending, descending thoracic, abdominal]  # Radiographic mild ectasia left subclavian  # Radiographic thickening proximal inferior mesenteric    Michelle is referred to rheumatology for evaluation of aortitis for concerns of Takayasu arteritis.     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  She was treated with prednisone, methotrexate and Humira and seems like a follow-up CT imaging based on report in March 2023 there seems to have been increase in ascending aorta diameter and more prominence in the distal aorta. There seems to have been some intervals where she was not taking methotrexate or Humira and she has been intermittently on prednisone.    Her clinical picture is consistent with large vessel vasculitis that can be classified as Takaysu arteritis.    Nonetheless, she has additional features including exertional shortness of breath, recurrent ear infections, eye redness for which additional evaluation would be warranted for other mimickers including ANCA vasculitis, IgG4 related disease.  Chronic infections will also need to be evaluated including tuberculosis, HIV and syphilitic aortitis.    We discussed getting updated labs  including inflammatory markers, additional autoimmune serology as well as CTA of her arterial tree in comparison with old images if possible.    She has been recently treated for ear infection, after resolution of infection then we can resume methotrexate and Humira to control her extensive large vessel vasculitis.    Plan:  - Get updated labs including inflammatory markers, urinalysis with microscopy  - Check ANCA serology, immunoglobulin level and IgG subclasses  - Serum IgE, complement C3 and C4 for workup of IgG4 related disease  - Get updated CTA of the arterial tree [chest abdomen and pelvis for monitoring] and [CTA head and neck to evaluate for disease]  - Echocardiogram    If there are no features of infection then we will resume methotrexate and humira     # Exertional shortness of breath, history of asthma  Pulmonary consultation.  Check ANCA serology. IgE levels   No reported parenchymal lung disease on CT imaging    # Recurrent ear infections  Agree with ENT referral recommended by primary care provider    # Recurrent eye redness with photophobia  Ophthalmology referral to rule out uveitis    # Lower extremity tingling and numbness  Neurology consultation    # Chronic immunosuppression  # Screening for chronic infections  Plan:  Check hepatitis B profile, C, QuantiFERON gold    # High risk medication requiring frequent lab tests for toxicity monitoring  History of methotrexate and Humira use  Plan:   Get updated labs  MTM pharmacy referral for counseling  Standing orders for monitoring      # Longitudinal care  The longitudinal plan of care for the diagnosis(es)/condition(s) as documented were addressed during this visit. Due to the added complexity in care, I will continue to support Michelle in the subsequent management and with ongoing continuity of care.      73 minutes spent by me on the date of the encounter doing chart review, history and exam, documentation and further activities per the  note      Return in about 5 weeks (around 11/5/2024) for Follow up.    René Stock MD  [facility] RHEUMATOLOGY

## 2024-10-01 NOTE — PATIENT INSTRUCTIONS
Our plan for today is:    - Tests:  Labs and imaging studies   Ophthalmology consultation   Pulmonary consultation   ENT consultation   Vascular medicine  consultation   Neurology consultation

## 2024-10-01 NOTE — TELEPHONE ENCOUNTER
The patient to called back to schedule the following:Aortitis   Location: [facility] [place] [place] [facility]  Provider: Dr. Bonifacio Romo  Appointment type: New Vascular Patient  Appointment mode: In Person  Return date: First available     Specialty phone number: [phone] or [phone]    [facility]: Yes  [facility]   Phone number: [phone]    Additional Notes:The pt wou;d like to be seen sooner that the [facility] has to offer the pt is calling [facility] to schedule.  Saúl Fierro on 10/1/2024 at 4:40 PM

## 2024-10-02 ENCOUNTER — TELEPHONE (OUTPATIENT)
Dept: PULMONOLOGY | Facility: CLINIC | Age: 34
End: 2024-10-02
Payer: COMMERCIAL

## 2024-10-02 LAB
ALBUMIN SERPL ELPH-MCNC: 4.1 G/DL (ref 3.7–5.1)
ALPHA1 GLOB SERPL ELPH-MCNC: 0.4 G/DL (ref 0.2–0.4)
ALPHA2 GLOB SERPL ELPH-MCNC: 0.7 G/DL (ref 0.5–0.9)
ALT SERPL W P-5'-P-CCNC: 33 U/L (ref 0–50)
AST SERPL W P-5'-P-CCNC: 79 U/L (ref 0–45)
B-GLOBULIN SERPL ELPH-MCNC: 1.1 G/DL (ref 0.6–1)
CREAT SERPL-MCNC: 0.64 MG/DL (ref 0.51–0.95)
CRP SERPL-MCNC: <3 MG/L
EGFRCR SERPLBLD CKD-EPI 2021: >90 ML/MIN/1.73M2
GAMMA GLOB SERPL ELPH-MCNC: 1.1 G/DL (ref 0.7–1.6)
GAMMA INTERFERON BACKGROUND BLD IA-ACNC: 0.06 IU/ML
HBV CORE AB SERPL QL IA: NONREACTIVE
HBV SURFACE AB SERPL IA-ACNC: <3.5 M[IU]/ML
HBV SURFACE AB SERPL IA-ACNC: NONREACTIVE M[IU]/ML
HBV SURFACE AG SERPL QL IA: NONREACTIVE
HCV AB SERPL QL IA: NONREACTIVE
LOCATION OF TASK: ABNORMAL
M PROTEIN SERPL ELPH-MCNC: 0 G/DL
M TB IFN-G BLD-IMP: NEGATIVE
M TB IFN-G CD4+ BCKGRND COR BLD-ACNC: 9.94 IU/ML
MITOGEN IGNF BCKGRD COR BLD-ACNC: 0.01 IU/ML
MITOGEN IGNF BCKGRD COR BLD-ACNC: 0.02 IU/ML
PROT PATTERN SERPL ELPH-IMP: ABNORMAL
QUANTIFERON MITOGEN: 10 IU/ML
QUANTIFERON NIL TUBE: 0.06 IU/ML
QUANTIFERON TB1 TUBE: 0.08 IU/ML
QUANTIFERON TB2 TUBE: 0.07
TOTAL PROTEIN SERUM FOR ELP: 7.3 G/DL (ref 6.4–8.3)

## 2024-10-03 LAB
CK SERPL-CCNC: 74 U/L (ref 26–192)
IGA SERPL-MCNC: 489 MG/DL (ref 84–499)
IGG SERPL-MCNC: 1020 MG/DL (ref 610–1616)
IGM SERPL-MCNC: 177 MG/DL (ref 35–242)

## 2024-10-04 LAB
H CAPSUL AG SER IA-ACNC: NOT DETECTED
H CAPSUL AG SER QL IA: NOT DETECTED
IGG SERPL-MCNC: 1065 MG/DL (ref 610–1616)
IGG1 SER-MCNC: 423 MG/DL (ref 382–929)
IGG2 SER-MCNC: 574 MG/DL (ref 242–700)
IGG3 SER-MCNC: 36 MG/DL (ref 22–176)
IGG4 SER-MCNC: 30 MG/DL (ref 4–86)
MYELOPEROXIDASE AB SER IA-ACNC: <0.3 U/ML
MYELOPEROXIDASE AB SER IA-ACNC: NEGATIVE
PROTEINASE3 AB SER IA-ACNC: <1 U/ML
PROTEINASE3 AB SER IA-ACNC: NEGATIVE
SUBCLASSES, PERCENT: 100 %

## 2024-10-04 NOTE — TELEPHONE ENCOUNTER
"Per VIPUL Rowell Referral for \"SOB\". No ILD findings in chart or on report from recent CTA scans done at [facility]. Please schedule in gen pulm. Lelia MATTHEW     --    LVM attempt 1 to schedule pulm referral.   "

## 2024-10-05 DIAGNOSIS — I77.6 AORTITIS (H): Primary | ICD-10-CM

## 2024-10-05 NOTE — RESULT ENCOUNTER NOTE
Inflammatory markers are unremarkable, stable CBC, creatinine  Slightly elevated AST     Plan:  Avoid Tylenol  Repeat AST in 2 weeks  Proceed with CT scans  MTM pharmacy referral for reinitiation of methotrexate and Humira after normalization of liver enzyme and resolution of infection    René Stock MD

## 2024-10-07 DIAGNOSIS — I77.6 AORTITIS (H): Primary | ICD-10-CM

## 2024-10-07 DIAGNOSIS — R06.00 DYSPNEA: Primary | ICD-10-CM

## 2024-10-07 LAB — HIV 1+2 AB+HIV1 P24 AG SERPL QL IA: NONREACTIVE

## 2024-10-07 NOTE — TELEPHONE ENCOUNTER
Spoke to patient to schedule 1-2 week pulm referral. Patient has been scheduled for 10/30 with Dr Jeffries, PFTs prior. She is aware of appt time and date.

## 2024-10-09 ENCOUNTER — VIRTUAL VISIT (OUTPATIENT)
Dept: PHARMACY | Facility: CLINIC | Age: 34
End: 2024-10-09
Attending: STUDENT IN AN ORGANIZED HEALTH CARE EDUCATION/TRAINING PROGRAM
Payer: COMMERCIAL

## 2024-10-09 ENCOUNTER — TELEPHONE (OUTPATIENT)
Dept: OTHER | Facility: CLINIC | Age: 34
End: 2024-10-09
Payer: COMMERCIAL

## 2024-10-09 DIAGNOSIS — J30.2 SEASONAL ALLERGIC RHINITIS, UNSPECIFIED TRIGGER: ICD-10-CM

## 2024-10-09 DIAGNOSIS — J45.31 MILD PERSISTENT ASTHMA WITH ACUTE EXACERBATION: ICD-10-CM

## 2024-10-09 DIAGNOSIS — I77.6 AORTITIS (H): ICD-10-CM

## 2024-10-09 DIAGNOSIS — M31.8 SYSTEMIC VASCULITIS (H): Primary | ICD-10-CM

## 2024-10-09 RX ORDER — ADALIMUMAB-ADAZ 40 MG/.4ML
40 INJECTION, SOLUTION SUBCUTANEOUS
Qty: 0.8 ML | Refills: 5 | OUTPATIENT
Start: 2024-10-09 | End: 2024-10-22

## 2024-10-09 RX ORDER — FOLIC ACID 1 MG/1
1 TABLET ORAL DAILY
Qty: 90 TABLET | Refills: 3 | Status: SHIPPED | OUTPATIENT
Start: 2024-10-09

## 2024-10-09 RX ORDER — METHOTREXATE 2.5 MG/1
25 TABLET ORAL
Qty: 40 TABLET | Refills: 5 | Status: SHIPPED | OUTPATIENT
Start: 2024-10-09

## 2024-10-09 NOTE — TELEPHONE ENCOUNTER
Referral received via [employer] on 10/3/24.    Referred by Bimal Pearl PA-C in [facility] for aortitis.    Previous imaging completed (pertinent to referral):  CTA chest/abdomen/pelvis scheduled 10/14/24.    Routing to scheduling to coordinate the following:  NEW VASCULAR PATIENT consult with Vascular Medicine  Please schedule this after 10/14/24 (so that CTA is completed)    Appt note:  Ref by Bimal Pearl PA-C in [facility] for aortitis; notes and imaging in Epic.    Toyin Patel, BSN, RN, CV-BC, CNOR  [facility] [place]

## 2024-10-09 NOTE — TELEPHONE ENCOUNTER
Left voicemail for patient to call back to schedule appointment(s), provided telephone number for patient to call back to schedule.    NEW VASCULAR PATIENT consult with Vascular Medicine  Please schedule this after 10/14/24 (so that CTA is completed)     Appt note:  Ref by Bimal Pearl PA-C in [facility] for aortitis; notes and imaging in Epic.

## 2024-10-10 ENCOUNTER — TELEPHONE (OUTPATIENT)
Dept: RHEUMATOLOGY | Facility: CLINIC | Age: 34
End: 2024-10-10
Payer: COMMERCIAL

## 2024-10-10 NOTE — TELEPHONE ENCOUNTER
PA Initiation    Medication: HYRIMOZ 40 MG/0.4ML SC SOAJ  Insurance Company: [employer] - Phone [phone] Fax [phone]  Pharmacy Filling the Rx: [facility] - [place], [place] - [address]  Filling Pharmacy Phone:    Filling Pharmacy Fax:    Start Date: 10/10/2024    [id number]

## 2024-10-14 ENCOUNTER — ANCILLARY PROCEDURE (OUTPATIENT)
Dept: CT IMAGING | Facility: CLINIC | Age: 34
End: 2024-10-14
Attending: STUDENT IN AN ORGANIZED HEALTH CARE EDUCATION/TRAINING PROGRAM
Payer: COMMERCIAL

## 2024-10-14 DIAGNOSIS — I77.6 AORTITIS (H): ICD-10-CM

## 2024-10-14 PROCEDURE — 71275 CT ANGIOGRAPHY CHEST: CPT | Performed by: RADIOLOGY

## 2024-10-14 PROCEDURE — 74174 CTA ABD&PLVS W/CONTRAST: CPT | Performed by: RADIOLOGY

## 2024-10-14 RX ORDER — IOPAMIDOL 755 MG/ML
125 INJECTION, SOLUTION INTRAVASCULAR ONCE
Status: COMPLETED | OUTPATIENT
Start: 2024-10-14 | End: 2024-10-14

## 2024-10-14 RX ADMIN — IOPAMIDOL 125 ML: 755 INJECTION, SOLUTION INTRAVASCULAR at 17:13

## 2024-10-14 NOTE — DISCHARGE INSTRUCTIONS

## 2024-10-14 NOTE — TELEPHONE ENCOUNTER
PRIOR AUTHORIZATION DENIED    Medication: HYRIMOZ 40 MG/0.4ML SC SOAJ  Insurance Company: [employer] - Phone [phone] Fax [phone]  Denial Date: 10/14/2024  Denial Reason(s): Not covered diagnosis    Appeal Information: [email or web address]  Patient Notified: yes

## 2024-10-16 NOTE — CONFIDENTIAL NOTE
RECORDS RECEIVED FROM: internal    DATE RECEIVED: 10.30.24    NOTES STATUS DETAILS   OFFICE NOTE from referring provider internal    René Stock MD      MEDICATION LIST internal     IMAGING  (NEED IMAGES AND REPORTS)     CT SCAN internal  10.14.24    [facility]- 3.29.23, 12.19.22    TESTS     PULMONARY FUNCTION TESTING (PFT) internal  Scheduled 10.30.24

## 2024-10-17 ENCOUNTER — TELEPHONE (OUTPATIENT)
Dept: RHEUMATOLOGY | Facility: CLINIC | Age: 34
End: 2024-10-17
Payer: COMMERCIAL

## 2024-10-17 NOTE — TELEPHONE ENCOUNTER
----- Message from René Stock sent at 10/14/2024 12:55 PM CDT -----  Regarding: RE: outside imaging  Perfect. Thank you  ----- Message -----  From: Mar Pagan CMA  Sent: 10/14/2024  11:25 AM CDT  To: René Sotck MD  Subject: RE: outside imaging                              Hi Dr. Stock.    I did call and leave a voicemail with the radiology department at [facility] for Felicias images. I'm just waiting for a call back.  -Yara  ----- Message -----  From: René Stock MD  Sent: 10/14/2024   8:55 AM CDT  To: Mar Pagan CMA  Subject: RE: outside imaging                              Thanks Yara for the follow up, I was not able to see the previous CT scans in the PACS system.  Maybe double check with radiology before contacting [facility] again.     Thank you    Below are the requested scans to be pushed:  STUDY: CHEST, ABDOMEN, AND PELVIS AORTIC CT ANGIOGRAPHY   Study date: 3/27/2023     STUDY: CHEST, ABDOMEN, AND PELVIS AORTIC CT ANGIOGRAPHY   Study date: 12/16/2022     STUDY: CT ANGIOGRAM OF THE CHEST, ABDOMEN AND PELVIS,   9/27/2022    STUDY: CHEST, ABDOMEN, AND PELVIS AORTIC CT ANGIOGRAPHY   Study date: 8/29/2022     STUDY:  CTA CHEST, ABDOMEN AND PELVIS,   4/29/2022     STUDY: CT CHEST, ABDOMEN AND PELVIS,   3/29/2022    CT CHEST PE STUDY, 3/26/2022  ----- Message -----  From: Mar Pagan CMA  Sent: 10/14/2024   8:41 AM CDT  To: René Stock MD  Subject: RE: outside imaging                              Dr. Stock,    All of the pt's outside records scans and [facility]. When I spoke to Alivia at [facility], she too reports that they pushed what they had and if you needed something more specific they could work on that.    -Yara  ----- Message -----  From: René Stock MD  Sent: 10/14/2024   8:20 AM CDT  To: Antonia Angeles RN; Justina Peters; #  Subject: outside imaging                                  Hello  team,  Can we please get the outside CT scans ( all the CT scans done at [facility] since 2022 till date ) for this patient pushed to our system so they are available for the radiologist for comparison.     Thank you

## 2024-10-22 ENCOUNTER — LAB (OUTPATIENT)
Dept: LAB | Facility: CLINIC | Age: 34
End: 2024-10-22
Payer: COMMERCIAL

## 2024-10-22 DIAGNOSIS — I77.6 AORTITIS (H): ICD-10-CM

## 2024-10-22 DIAGNOSIS — R06.02 SOB (SHORTNESS OF BREATH): ICD-10-CM

## 2024-10-22 LAB
AST SERPL W P-5'-P-CCNC: 30 U/L (ref 0–45)
T PALLIDUM AB SER QL: NONREACTIVE

## 2024-10-22 PROCEDURE — 36415 COLL VENOUS BLD VENIPUNCTURE: CPT

## 2024-10-22 PROCEDURE — 86160 COMPLEMENT ANTIGEN: CPT

## 2024-10-22 PROCEDURE — 86780 TREPONEMA PALLIDUM: CPT

## 2024-10-22 PROCEDURE — 84450 TRANSFERASE (AST) (SGOT): CPT

## 2024-10-22 PROCEDURE — 82785 ASSAY OF IGE: CPT

## 2024-10-22 NOTE — PATIENT INSTRUCTIONS
"Recommendations from today's MTM visit:                                                       1. We are working on obtaining insurance coverage for Humira. Once this is approved the pharmacy will call you to set up delivery so you can start 40 mg (1 injection) every 14 days.     2. A common side effect of Humira is injection site reactions (red, raised, itchy spot at injection site). You can use hydrocortisone cream and ice to treat these reactions if they occur.    Follow-up: Return in about 3 months (around 1/9/2025) for MTM Pharmacist Visit.    It was great speaking with you today.  I value your experience and would be very thankful for your time in providing feedback in our clinic survey. In the next few days, you may receive an email or text message from [facility] [employer] with a link to a survey related to your  clinical pharmacist.\"     To schedule another MTM appointment, please call the clinic directly or you may call the MTM scheduling line at [phone].    My Clinical Pharmacist's contact information:                                                      Please feel free to contact me with any questions or concerns you have.      Reina Rodriguez, PharmD  Medication Therapy Management Pharmacist  [facility]  Phone: [phone]     "

## 2024-10-22 NOTE — PROGRESS NOTES
Medication Therapy Management (MTM) Encounter    ASSESSMENT:                            Medication Adherence/Access: No issues identified.    Systemic Vasculitis/Aoritis: Provided education on Humira today including dosing, general administration, side effects (both common/serious), precautions, monitoring and time to efficacy. Discussed data on malignancy and risk of serious infection in depth. Encouraged indicated non-live vaccines and avoidance of live vaccines. Discussed potential need to hold therapy in the setting of signs/symptoms of active infection. Encouraged her to contact the rheumatology clinic in the event she has questions on this. Would benefit from starting Humira once it arrives and using 40 mg every 14 days as directed. Will resend methotrexate and folic acid refills as requested so patient can continue methotrexate 25 mg weekly and folic acid 1 mg daily.    Asthma: Would benefit from regular follow up with pulmonologist.    Allergies: Stable.    PLAN:                            1. We are working on obtaining insurance coverage for Humira. Once this is approved the pharmacy will call you to set up delivery so you can start 40 mg (1 injection) every 14 days.     2. A common side effect of Humira is injection site reactions (red, raised, itchy spot at injection site). You can use hydrocortisone cream and ice to treat these reactions if they occur.    Follow-up: Return in about 3 months (around 1/9/2025) for MTM Pharmacist Visit.    SUBJECTIVE/OBJECTIVE:                          Michelle Joshua is a 33 year old female seen for an initial visit. She was referred to me from René Stock MD.      Reason for visit: Need to restart methotrexate and Humira     Allergies/ADRs: Reviewed in chart  Past Medical History: Reviewed in chart  Tobacco: She reports that she has been smoking cigarettes. She has never used smokeless tobacco.Nicotine/Tobacco Cessation Plan  Not discussed today  Alcohol: 1-3  beverages / week    Medication Adherence/Access: no issues reported.    Systemic Vasculitis/Aoritis:   Atorvastatin 20 mg daily  Losartan 50 mg daily    Reports she was diagnosed with Takayasu arteritis in 2022 and previously had taken prednisone, Humira, and methotrexate. Discontinued Humira and continued methotrexate however has now also run out of methotrexate and needs a refill. Very comfortable with self-injections, not many questions or concerns about Humira as she has used it before.    Reviewed baseline pre-biologic screening.   Hep C antibody non-reactive  Hep B surface antibody non-reactive  Hep B surface antigen non-reactive  Hep B core antibody non-reactive  Quantiferon TB Negative  HIV antigen non-reactive     Liver Function Studies -   Recent Labs   Lab Test 10/01/24  1515 12/12/20  0000 01/31/17  2031   PROTTOTAL  --   --  7.1   ALBUMIN  --   --  3.6   BILITOTAL  --   --  0.4   ALKPHOS  --   --  91   AST 79*  --  20   ALT 33   < > 58*    < > = values in this interval not displayed.     CBC RESULTS:   Recent Labs   Lab Test 10/01/24  1515   WBC 8.6   RBC 4.21   HGB 13.8   HCT 39.9   MCV 95   MCH 32.8   MCHC 34.6   RDW 14.7        Asthma:  Flovent  mcg twice daily   Albuterol as needed     No side effects or concerns noted. Having some SOB on exertion but also recovering from pneumonia. Planning on seeing pulmonary soon.    Allergies:  Flonase 50 mcg 1 spray in both nostrils daily  Loratadine 10 mg daily    No side effects or concerns noted. Feels medications are working well.    Today's Vitals: LMP 09/18/2024   ----------------  Post Discharge Medication Reconciliation Status: discharge medications reconciled, continue medications without change.    I spent 33 minutes with this patient today. All changes were made via collaborative practice agreement with René Stock A copy of the visit note was provided to the patient's provider(s).    A summary of these recommendations was sent  via [employer].    Iesha BorregoD  Medication Therapy Management Pharmacist  [facility]  Phone: [phone]    Telemedicine Visit Details  The patient's medications can be safely assessed via a telemedicine encounter.  Type of service:  Telephone visit  Originating Location (pt. Location): Home  Distant Location (provider location):  Off-site  Start Time: 10:00 AM  End Time: 10:33 AM     Medication Therapy Recommendations  Aortitis (H)    Current Medication: Adalimumab 40 MG/0.4ML AJKT   Rationale: Does not understand instructions - Adherence - Adherence   Recommendation: Provide Education   Status: Patient Agreed - Adherence/Education

## 2024-10-23 LAB
C3 SERPL-MCNC: 130 MG/DL (ref 81–157)
C4 SERPL-MCNC: 23 MG/DL (ref 13–39)
IGE SERPL-ACNC: 2266 KU/L (ref 0–114)

## 2024-10-23 NOTE — TELEPHONE ENCOUNTER
Medication Appeal Initiation    Medication: HYRIMOZ 40 MG/0.4ML SC SOAJ  Appeal Start Date:  10/23/2024  Insurance Company: [email or web address]    I made note to plan that if Humira is the preferred adalimumab product, then that would be fine.

## 2024-10-25 NOTE — RESULT ENCOUNTER NOTE
Results are within normal range/Stable.   IgE elevated could be secondary to asthma or allergy. Awaiting pulmonary evaluation     René Stock MD

## 2024-10-28 NOTE — TELEPHONE ENCOUNTER
MEDICATION APPEAL APPROVED    Medication: HYRIMOZ 40 MG/0.4ML SC SOAJ  Authorization Effective Date: 9/22/2024  Authorization Expiration Date: 9/22/2025  Approved Dose/Quantity: 2 per 28 days  Reference #: [id number]   Appeal Insurance Company: Brian  Expected CoPay: $ 0     CoPay Card Available: No  Financial Assistance Needed: No  Filling Pharmacy: [facility] - [facility] [address]  Patient Notified: Not needed  Comments:   Plan prefers Humira.

## 2024-10-30 ENCOUNTER — PRE VISIT (OUTPATIENT)
Dept: PULMONOLOGY | Facility: CLINIC | Age: 34
End: 2024-10-30

## 2024-10-31 ENCOUNTER — OFFICE VISIT (OUTPATIENT)
Dept: OTHER | Facility: CLINIC | Age: 34
End: 2024-10-31
Attending: INTERNAL MEDICINE
Payer: COMMERCIAL

## 2024-10-31 VITALS
BODY MASS INDEX: 29.29 KG/M2 | DIASTOLIC BLOOD PRESSURE: 76 MMHG | SYSTOLIC BLOOD PRESSURE: 152 MMHG | OXYGEN SATURATION: 98 % | HEART RATE: 104 BPM | WEIGHT: 150 LBS

## 2024-10-31 DIAGNOSIS — M31.6 LARGE VESSEL VASCULITIS (H): ICD-10-CM

## 2024-10-31 DIAGNOSIS — E78.5 HYPERLIPIDEMIA LDL GOAL <70: ICD-10-CM

## 2024-10-31 DIAGNOSIS — I77.6 AORTITIS (H): Primary | ICD-10-CM

## 2024-10-31 DIAGNOSIS — F17.218 CIGARETTE NICOTINE DEPENDENCE WITH OTHER NICOTINE-INDUCED DISORDER: ICD-10-CM

## 2024-10-31 DIAGNOSIS — I10 BENIGN ESSENTIAL HYPERTENSION: ICD-10-CM

## 2024-10-31 DIAGNOSIS — R76.8 ELEVATED IGE LEVEL: ICD-10-CM

## 2024-10-31 DIAGNOSIS — J45.40 MODERATE PERSISTENT ASTHMA, UNSPECIFIED WHETHER COMPLICATED: ICD-10-CM

## 2024-10-31 PROCEDURE — G2211 COMPLEX E/M VISIT ADD ON: HCPCS | Performed by: INTERNAL MEDICINE

## 2024-10-31 PROCEDURE — 99205 OFFICE O/P NEW HI 60 MIN: CPT | Performed by: INTERNAL MEDICINE

## 2024-10-31 PROCEDURE — 99417 PROLNG OP E/M EACH 15 MIN: CPT | Performed by: INTERNAL MEDICINE

## 2024-10-31 PROCEDURE — 99406 BEHAV CHNG SMOKING 3-10 MIN: CPT | Performed by: INTERNAL MEDICINE

## 2024-10-31 PROCEDURE — G0463 HOSPITAL OUTPT CLINIC VISIT: HCPCS | Performed by: INTERNAL MEDICINE

## 2024-10-31 RX ORDER — LOSARTAN POTASSIUM 100 MG/1
100 TABLET ORAL DAILY
Qty: 90 TABLET | Refills: 1 | Status: SHIPPED | OUTPATIENT
Start: 2024-10-31

## 2024-10-31 NOTE — NURSING NOTE
[facility]        Patient is here for a consult to discuss aortitis    Pt is currently taking no meds that would impact our treatment plan.    /68 (BP Location: Right arm, Patient Position: Sitting, Cuff Size: Adult Regular)   Wt 150 lb (68 kg)   LMP 09/18/2024   BMI 29.29 kg/m      The provider has been notified that the patient has no concerns.     Questions patient would like addressed today are: N/A.    Refills are needed: N/A    Has homecare services and agency name:  Sheryl Luna MA

## 2024-10-31 NOTE — PATIENT INSTRUCTIONS
Go for head and neck CTA ordered by Rheumatologist scheduled on 11/1/24 at 11:30 AM     Quit smoking     Increased losartan dose to 100 mg daily take at bed time New Rx sent     Go for ECHO order placed our staff will call you     Follow rheumatologist recommendations and continue rest of the medications same     Please reschedule with pulmonary since you missed yesterday appointment     Follow up [facility] after ECHO and fasting labs completed

## 2024-10-31 NOTE — PROGRESS NOTES
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 Peacock who initiated prednisone 40 mg daily then followed by methotrexate 10 mg weekly along with aspirin daily.  She was referred to see the rheumatologist and she followed with Dr. Gregorio who adjusted the medications and initiated Humira injections every 2 weeks and continued methotrexate 25 mg weekly and off of prednisone.  Intermittently there were some gaps in her continuation of medications.   She claims seeing both Dr. Gregorio and  sometime in 2024 unable to find any documentation .  She established care with Bimal Pearl at [facility] system  She was recently seen on October 1, 2020 for by [facility] rheumatologist underwent multiple imaging studies and extensive laboratory tests    She has a history of rheumatoid factor positive in 2022 but CCP antibody negative  Complement levels are normal  ANCA level negative to MPO negative  CO-3 antibodies negative  She has history of asthma and elevated and high IgE levels  TITA +1 in 160 homogeneous but double-stranded DNA negative  She underwent multiple plain imaging studies x-ray of the hands and x-ray of the feet no erosive changes    She underwent CT of the chest, abdomen, pelvis on October 14, 2024  Ascending thoracic aorta 49 mm and sinuses of Valsalva 42 mm    There was no radiological suggestion of aortitis  There was nonspecific bilateral common iliac  artery wall thickening noted probably sequela of previous arteritis  No stenosis or beading or dissection noted    She failed appointment with pulmonary service yesterday  She is scheduled to undergo CTA of head and neck tomorrow  Echocardiogram was ordered but it was not scheduled?  She is not aware of echo  Her blood pressure is elevated  She is taking atorvastatin, low-dose losartan, inhalers, methotrexate and Humira etc.    She continues to smoke half a pack cigarettes daily    She is new to me reviewed available extensive records in the epic and updated chart    PAST MEDICAL HISTORY  Past Medical History:   Diagnosis Date    Uncomplicated asthma     Unspecified otitis media     Otitis Media as a child       CURRENT MEDICATIONS  Current Outpatient Medications   Medication Sig Dispense Refill    Adalimumab 40 MG/0.4ML AJKT Inject 40 mg subcutaneously every 14 days. 0.8 each 5    albuterol (PROAIR HFA/PROVENTIL HFA/VENTOLIN HFA) 108 (90 Base) MCG/ACT inhaler Inhale 2 puffs into the lungs every 6 hours as needed for shortness of breath, wheezing or cough. 18 g 0    atorvastatin (LIPITOR) 20 MG tablet Take 1 tablet (20 mg) by mouth daily. 90 tablet 1    EPINEPHrine (ANY BX GENERIC EQUIV) 0.3 MG/0.3ML injection 2-pack Inject 0.3 mLs (0.3 mg) into the muscle as needed for anaphylaxis May repeat one time in 5-15 minutes if response to initial dose is inadequate. 2 each 11    fluticasone (FLONASE) 50 MCG/ACT nasal spray Spray 1 spray into both nostrils daily 10 mL 1    fluticasone (FLOVENT HFA) 220 MCG/ACT inhaler Inhale 1 puff into the lungs 2 times daily. 12 g 0    folic acid (FOLVITE) 1 MG tablet Take 1 tablet (1 mg) by mouth daily. 90 tablet 3    loratadine (CLARITIN) 10 MG tablet Take 1 tablet (10 mg) by mouth daily 90 tablet 3    losartan (COZAAR) 100 MG tablet Take 1 tablet (100 mg) by mouth daily. 90 tablet 1    methotrexate 2.5 MG tablet Take 10 tablets (25 mg) by mouth every 7 days. 40 tablet 5     No current  facility-administered medications for this visit.       PAST SURGICAL HISTORY:  Past Surgical History:   Procedure Laterality Date    HC REMOVE TONSILS/ADENOIDS,<12 Y/O      T & A <11y.o.    ZZHC CREATE EARDRUM OPENING,GEN ANESTH      P.E. Tubes       ALLERGIES     Allergies   Allergen Reactions    Aspirin Cough     Other Reaction(s): Runny Nose    Acetaminophen     Amoxicillin Hives    Amoxicillin-Pot Clavulanate Hives    Bees     Pcn [Penicillins]     Sulfa Antibiotics Hives    Sulfasalazine        FAMILY HISTORY  Family History   Problem Relation Age of Onset    Hypertension Mother     Anxiety Disorder Mother     Depression Mother     Depression Sister     Anxiety Disorder Sister     Mental Illness Sister         bipolar           SOCIAL HISTORY  Social History     Socioeconomic History    Marital status: Single     Spouse name: Not on file    Number of children: 0    Years of education: 10    Highest education level: Not on file   Occupational History    Occupation: student   Tobacco Use    Smoking status: Every Day     Current packs/day: 0.50     Types: Cigarettes    Smokeless tobacco: Never   Vaping Use    Vaping status: Some Days   Substance and Sexual Activity    Alcohol use: Yes     Comment: occaionally.    Drug use: No    Sexual activity: Yes     Partners: Male   Other Topics Concern    Parent/sibling w/ CABG, MI or angioplasty before 65F 55M? No   Social History Narrative             Social Drivers of Health     Financial Resource Strain: Low Risk  (11/17/2023)    Financial Resource Strain     Within the past 12 months, have you or your family members you live with been unable to get utilities (heat, electricity) when it was really needed?: No   Recent Concern: Financial Resource Strain - High Risk (10/13/2023)    Financial Resource Strain     Within the past 12 months, have you or your family members you live with been unable to get utilities (heat, electricity) when it was really needed?: Yes   Food  Insecurity: Low Risk  (11/17/2023)    Food Insecurity     Within the past 12 months, did you worry that your food would run out before you got money to buy more?: No     Within the past 12 months, did the food you bought just not last and you didn t have money to get more?: No   Recent Concern: Food Insecurity - High Risk (10/13/2023)    Food Insecurity     Within the past 12 months, did you worry that your food would run out before you got money to buy more?: No     Within the past 12 months, did the food you bought just not last and you didn t have money to get more?: Yes   Transportation Needs: Low Risk  (11/17/2023)    Transportation Needs     Within the past 12 months, has lack of transportation kept you from medical appointments, getting your medicines, non-medical meetings or appointments, work, or from getting things that you need?: No   Physical Activity: Not on file   Stress: Not on file   Social Connections: Unknown (3/30/2022)    [facility] [employer] [facility] [employer] [facility]    Social Connections     Frequency of Communication with Friends and Family: Not on file   Interpersonal Safety: Not on file   Housing Stability: Low Risk  (11/17/2023)    Housing Stability     Do you have housing? : Yes     Are you worried about losing your housing?: No       ROS:   General: No change in weight, sleep or appetite.  Normal energy.  No fever or chills  Eyes: Negative for vision changes or eye problems  ENT: No problems with ears, nose or throat.  No difficulty swallowing.  Resp: Ongoing shortness of breath with known history of asthma  CV: No chest pains or palpitations  GI: No nausea, vomiting,  heartburn, abdominal pain, diarrhea, constipation or change in bowel habits  : No urinary frequency or dysuria, bladder or kidney problems  Musculoskeletal: No significant muscle or joint pains  Neurologic: No headaches, numbness, tingling, weakness, problems with  balance or coordination  Psychiatric: No problems with anxiety, depression or mental health  Heme/immune/allergy: No history of bleeding or clotting problems or anemia.  No allergies or immune system problems  Endocrine: No history of thyroid disease, diabetes or other endocrine disorders  Skin: No rashes,worrisome lesions or skin problems  Vascular: History of aortitis most likely Takayasu arteritis/large vessel vasculitis  She denies any arm claudication or leg claudication or abdominal pain, unintentional weight loss  No fever, no chills    EXAM:  BP (!) 152/76 (BP Location: Left arm, Patient Position: Sitting, Cuff Size: Adult Regular)   Pulse 104   Wt 150 lb (68 kg)   LMP 09/18/2024   SpO2 98%   BMI 29.29 kg/m    In general, the patient is a pleasant female in no apparent distress.    HEENT: NC/AT.  PERRLA.  EOMI.  Sclerae white, not injected.  Nares clear.  Pharynx without erythema or exudate.  Dentition intact.    Neck: No adenopathy.  No thyromegaly. Carotids +2/2 bilaterally without bruits.  No jugular venous distension.   Heart: RRR. Normal S1, S2   Lungs: Occasional rhonchi  Abdomen: Soft, nontender, nondistended.  Extremities: No clubbing, cyanosis, or edema.  No wounds.   She has a symmetrical palpable peripheral pulses both upper and lower extremities no pulse delay  No swelling of the hands, elbow, ankles and feet.  Good range of motion of major joints  No evidence of arterial or venous insufficiency        Labs:    LIVER ENZYME RESULTS:  Lab Results   Component Value Date    AST 30 10/22/2024    AST 20 01/31/2017    ALT 33 10/01/2024    ALT 20 12/12/2020       CBC RESULTS:  Lab Results   Component Value Date    WBC 8.6 10/01/2024    WBC 10.6 01/31/2017    RBC 4.21 10/01/2024    RBC 4.55 01/31/2017    HGB 13.8 10/01/2024    HGB 14.3 01/31/2017    HCT 39.9 10/01/2024    HCT 41.8 01/31/2017    MCV 95 10/01/2024    MCV 92 01/31/2017    MCH 32.8 10/01/2024    MCH 31.4 01/31/2017    MCHC 34.6  10/01/2024    MCHC 34.2 01/31/2017    RDW 14.7 10/01/2024    RDW 13.1 01/31/2017     10/01/2024     01/31/2017       BMP RESULTS:  Lab Results   Component Value Date     01/31/2017    POTASSIUM 3.6 12/13/2020    CHLORIDE 107 01/31/2017    CO2 26 01/31/2017    ANIONGAP 6 01/31/2017     (H) 12/13/2020    BUN 9 01/31/2017    CR 0.64 10/01/2024    CR 0.49 (L) 12/13/2020    GFRESTIMATED >90 10/01/2024    GFRESTIMATED >120 12/13/2020    GFRESTBLACK >120 12/13/2020    FIDELIA 8.6 01/31/2017          THYROID RESULTS:  Lab Results   Component Value Date    TSH 0.76 11/10/2014       Procedures:     Reviewed previous multiple imaging studies    CTA chest, abdomen, pelvis with contrast, 3/2023:  1. Thickening of the suprarenal and proximal infrarenal aortic wall and periaortic thickening/straning in the distal abdominal aorta consistent with aortitis.  2. Focal irregular enlargement of the ascending aorta and descending thoracic aorta. Maximum ascending aorta diameters are 43 x 42 mm with area/height ratio of 9.3 cm2/m. Maximum descending thoracic aorta diameters are 36 x 32 mm.  3. Moderate stenosis in the proximal inferior mesenteric artery.  4. Patent iliac and common femoral arteries.  5. Compared to prior study on 12/16/22: Maximum ascending aorta diameters have increased. Distal periaortic wall thickening and soft tissue stranding appears more prominent.     CT chest, abdomen, pelvis with contrast, 3/2022:   1. Prominent aorta thickening identified, noted of the distal ascending aorta throughout the aortic arch, descending thoracic aorta, and ending at the level of the renal arteries c/w aortitis.   A. The maximal thickening is at the level or just below the level of the diaphragm at 8-9 mm.   B. There is aneurysmal dilatation in the mid descending thoracic aorta up to 3.7 cm.   C. There is irregular aortitis-type change extending into the base of the brachiocephalic and left subclavian vessels.    D. No other branch vessels were definitely involved with aortitis changes. In-flow vessels appear normal in thickness and renal arteries are  widely patent.     CT abdomen pelvis with contrast, 2017:  No acute changes in the abdomen or pelvis to account for patient's symptoms. No bowel obstruction or diverticulitis. Appendix is normal.        CTA CHEST, ABDOMEN, PELVIS 10/14/2024 5:26 PM     CLINICAL HISTORY: aortitis for follow up; Aortitis (H).      COMPARISONS: CT abdomen pelvis 2017     REFERRING PROVIDER: RICH SMITH     TECHNIQUE: Unenhanced CT chest, abdomen, pelvis. After the uneventful  administration of intravenous contrast, EKG gated CTA aortic root. CTA  chest, abdomen, pelvis. Coronal and sagittal reconstructions were  produced. Lung MIP produced.     CONTRAST: 124 mL Isovue 370     DOSE (DLP): 1754 mGy*cm     FINDINGS: CTA: No periaortic thickening or stranding. No beading. No  dissection. No stenosis.     Patent aorta. Normal branching pattern. Right innominate artery  measures 15 mm in diameter. Right innominate, subclavian, and carotid,  left carotid, and left subclavian arteries patent. Bilateral vertebral  arteries patent.     Celiac, superior mesenteric, bilateral single renal, and inferior  mesenteric arteries patent. Replaced right hepatic artery originates  from the superior mesenteric artery.     Non specific bilateral common iliac artery wall thickening. Adjacent  fat is without stranding. Bilateral common, internal, and external  iliac arteries patent. Left corona mortis. Bilateral common femoral  arteries patent.     Aortic measurements:  Sinuses of Valsalva: 42 mm  Sinotubular junction: 37 mm  Ascendin mm  Arch: 29 mm  Proximal descendin mm  Mid descendin mm  Diaphragm: 28 mm  Below superior mesenteric origin: 26 mm  Infrarenal: 18 mm  Above bifurcation: 15 mm     Right common iliac artery: 12 mm     Left common iliac artery: 12 mm     CT: 3 mm right middle  lobe nodule. 2 mm left lower lobe pleural based  nodule.      Subcutaneous air bubbles in the right lower quadrant.     Right ovarian follicles or cysts.                                                                      IMPRESSION:  1. Non specific bilateral common iliac artery wall thickening,  possible sequelae of arteritis. No adjacent fat stranding to suggest  active inflammation. No stenosis, beading, or dissection.     2. Aortitis not suggested.     3. Enlarged aortic measurements:  A. Sinuses of Valsalva: 42 mm  B. Ascending thoracic aorta: 49 mm  C. Below superior mesenteric artery origin: 26 mm     4. Sub 6 mm pulmonary nodules. Per Fleischner Society recommendations,  if the patient is at low risk for lung cancer, no follow up is needed.  If the patient is at high risk for lung cancer, optional chest CT in  12 months could be performed for reevaluation.     ANGEL HUGHES MD       Assessment and Plan:     1. 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 Large vessel vasculitis (H)  (She has a radiographic evidence of thickening of the ascending, descending thoracic and abdominal aorta, proximal inferior mesenteric artery mild ectasia of left subclavian artery )    - Vascular Medicine Referral  - losartan (COZAAR) 100 MG tablet; Take 1 tablet (100 mg) by mouth daily.  Dispense: 90 tablet; Refill: 1    She initially presented in February 2022 to [facility] with chest pain and systemic symptoms workup revealed large vessel vasculitis which is consistent with Takayasu arteritis.  She was extensively evaluated by vascular medicine service Dr. Pritchett, rheumatologist Dr. Gregorio and most [facility]  She is dealing with shortness of breath and carries the diagnosis of asthma and recent IgE level was high.      3. Moderate persistent asthma, unspecified whether complicated    4. Elevated IgE level    5. Hyperlipidemia LDL goal <70  - Lipid panel reflex  to direct LDL Fasting; Future    6. Benign essential hypertension  - Echocardiogram Complete; Future    7. Cigarette nicotine dependence with other nicotine-induced disorder  - SMOKING CESSATION COUNSELING, 3-10 MIN     This is a very pleasant 33-year-old female clinical picture is consistent with large vessel vasculitis Takayasu arteritis.  She initially presented to the [facility] with chest pain and systemic symptoms and radiographic findings consistent withlarge vessel vasculitis in 2022 involving aortic arch, descending aorta, aneurysmal dilatation of the descending aorta, brachiocephalic, left subclavian which is consistent with Takayasu arteritis.  She was treated with prednisone, methotrexate and Humira and seems like a follow-up CT imaging based on report in March 2023 there seems to have been increase in ascending aorta diameter and more prominence in the distal aorta. There seems to have been some intervals where she was not taking methotrexate or Humira and she has been intermittently on prednisone   She was seen and evaluated by vascular medicine physician at [facility] and previously rheumatologist over there and currently seeing rheumatologist at [facility]  Reviewed recent laboratory tests all of them are unremarkable except elevated IgE likely from underlying asthma  She was supposed to see pulmonologist yesterday and she missed her appointment    She is not aware of future echocardiogram order and also she is not aware of head and neck CTA which was scheduled on November 1, 2024  She smokes half a pack cigarettes daily  Blood pressure is elevated, taking losartan 50 mg daily    I had a lengthy discussion with the patient and at present my recommendations,     Quit smoking offered help and she wanted to quit on her own she is under a lot of stress currently  Than 3 minutes of smoking cessation counseling was done  Take medications as prescribed  Suggested  her to go for head and neck CTA scheduled tomorrow and print out given to the patient  She has not scheduled echocardiogram yet I have placed order again and suggested patient to complete the test  Closely follow-up with [facility] rheumatologist and follow the recommendations  Increase losartan dose to 100 mg daily new prescription sent monitor blood pressure at home  Continue atorvastatin same  Virtual visit with me after completion of the echo and fasting lipids      78 minutes spent on the date of the encounter doing chart review, history and exam, documentation, and further activities as noted above.  Prolonged service is due to medical complexity review of extensive records previous imaging studies outside evaluation, recent labs etc. she is new to this clinic and updated chart  AVS with written instructions given    The longitudinal care of plan for the above diagnoses was addressed during this visit. Due to added complexity of care, we will continue to supprt Michelle Joshua and the subsequent management of this/these conditions and with ongoing continuity of care for this/these conditions.     Thank you for the consultation  Copy of this note to primary care provider    Gomez Chris MD,FAHA,FSVM,FNLA, FACP  Vascular Medicine  Clinical Hypertension Specialist   Clinical Lipidologist

## 2024-11-01 ENCOUNTER — HOSPITAL ENCOUNTER (OUTPATIENT)
Dept: CT IMAGING | Facility: CLINIC | Age: 34
Discharge: HOME | End: 2024-11-01
Attending: STUDENT IN AN ORGANIZED HEALTH CARE EDUCATION/TRAINING PROGRAM | Admitting: STUDENT IN AN ORGANIZED HEALTH CARE EDUCATION/TRAINING PROGRAM
Payer: COMMERCIAL

## 2024-11-01 ENCOUNTER — TELEPHONE (OUTPATIENT)
Dept: OTHER | Facility: CLINIC | Age: 34
End: 2024-11-01

## 2024-11-01 DIAGNOSIS — E78.5 HYPERLIPIDEMIA LDL GOAL <70: ICD-10-CM

## 2024-11-01 DIAGNOSIS — Z11.59 NEED FOR HEPATITIS C SCREENING TEST: ICD-10-CM

## 2024-11-01 DIAGNOSIS — I77.6 AORTITIS (H): Primary | ICD-10-CM

## 2024-11-01 DIAGNOSIS — M31.6 LARGE VESSEL VASCULITIS (H): ICD-10-CM

## 2024-11-01 DIAGNOSIS — I10 BENIGN ESSENTIAL HYPERTENSION: ICD-10-CM

## 2024-11-01 DIAGNOSIS — I77.6 AORTITIS (H): ICD-10-CM

## 2024-11-01 PROCEDURE — 250N000009 HC RX 250: Performed by: STUDENT IN AN ORGANIZED HEALTH CARE EDUCATION/TRAINING PROGRAM

## 2024-11-01 PROCEDURE — 70498 CT ANGIOGRAPHY NECK: CPT

## 2024-11-01 PROCEDURE — 70496 CT ANGIOGRAPHY HEAD: CPT | Mod: 26 | Performed by: RADIOLOGY

## 2024-11-01 PROCEDURE — 70496 CT ANGIOGRAPHY HEAD: CPT

## 2024-11-01 PROCEDURE — 250N000011 HC RX IP 250 OP 636: Performed by: STUDENT IN AN ORGANIZED HEALTH CARE EDUCATION/TRAINING PROGRAM

## 2024-11-01 PROCEDURE — 70498 CT ANGIOGRAPHY NECK: CPT | Mod: 26 | Performed by: RADIOLOGY

## 2024-11-01 RX ORDER — IOPAMIDOL 755 MG/ML
100 INJECTION, SOLUTION INTRAVASCULAR ONCE
Status: COMPLETED | OUTPATIENT
Start: 2024-11-01 | End: 2024-11-01

## 2024-11-01 RX ADMIN — IOPAMIDOL 67 ML: 755 INJECTION, SOLUTION INTRAVENOUS at 11:57

## 2024-11-01 RX ADMIN — SODIUM CHLORIDE 80 ML: 9 INJECTION, SOLUTION INTRAVENOUS at 11:57

## 2024-11-01 NOTE — TELEPHONE ENCOUNTER
Routing to scheduling to coordinate the following:    Fasting labs  Echocardiogram   Virtual follow up 1 week after all tests  Please schedule this at next available     Appt note: Follow up to 10/31/24    Kristen BETTS RN    [facility]  Office: [phone]  Fax: [phone]

## 2024-11-01 NOTE — TELEPHONE ENCOUNTER
Patient is scheduled for her fasting labs on 11/04/24 , Echo on 11/27/24 and Telephone Visit with Dr Chris on 12/04/24.

## 2024-11-19 ENCOUNTER — MYC MEDICAL ADVICE (OUTPATIENT)
Dept: RHEUMATOLOGY | Facility: CLINIC | Age: 34
End: 2024-11-19

## 2024-11-19 ENCOUNTER — DOCUMENTATION ONLY (OUTPATIENT)
Dept: RHEUMATOLOGY | Facility: CLINIC | Age: 34
End: 2024-11-19
Payer: COMMERCIAL

## 2024-11-19 ENCOUNTER — TELEPHONE (OUTPATIENT)
Dept: RHEUMATOLOGY | Facility: CLINIC | Age: 34
End: 2024-11-19
Payer: COMMERCIAL

## 2024-11-19 DIAGNOSIS — J45.21 MILD INTERMITTENT ASTHMA WITH ACUTE EXACERBATION: Primary | ICD-10-CM

## 2024-11-19 DIAGNOSIS — Z91.199 NO-SHOW FOR APPOINTMENT: Primary | ICD-10-CM

## 2024-11-19 NOTE — TELEPHONE ENCOUNTER
Called pt to reschedule missed appointment. Left message with phone number for return call. Per Dr. Stock, pt should plan to have labs completed before next follow-up as well as schedule and complete echocardiogram.

## 2024-11-19 NOTE — PROGRESS NOTES
Michelle was no-show to the Rheumatology appointment today.     René Stock MD  Rheumatology attending

## 2024-11-27 NOTE — TELEPHONE ENCOUNTER
RECORDS RECEIVED FROM: Internal    REASON FOR VISIT: tingling/numbness   PROVIDER: Oj Aguiar DO   DATE OF APPT: 1/14/25 @ 11:00 am    NOTES (FOR ALL VISITS) STATUS DETAILS   OFFICE NOTE from referring provider Internal 10/1/24 René Stock MD @[facility] Rheum     OFFICE NOTE from other specialist Internal 10/31/24 Gomez Chris MD @[facility] Vas    10/9/24 Reina Rodriguez [facility] @[facility]Rheum     MEDICATION LIST Internal    IMAGING  (FOR ALL VISITS)     CT (HEAD, NECK, SPINE) Internal [facility]  11/1/24 CTA Head Neck

## 2024-12-10 ENCOUNTER — OFFICE VISIT (OUTPATIENT)
Dept: FAMILY MEDICINE | Facility: CLINIC | Age: 34
End: 2024-12-10
Payer: COMMERCIAL

## 2024-12-10 ENCOUNTER — TELEPHONE (OUTPATIENT)
Dept: FAMILY MEDICINE | Facility: CLINIC | Age: 34
End: 2024-12-10

## 2024-12-10 VITALS
RESPIRATION RATE: 17 BRPM | WEIGHT: 150.2 LBS | DIASTOLIC BLOOD PRESSURE: 84 MMHG | BODY MASS INDEX: 28.36 KG/M2 | HEART RATE: 103 BPM | SYSTOLIC BLOOD PRESSURE: 159 MMHG | HEIGHT: 61 IN | TEMPERATURE: 97.5 F | OXYGEN SATURATION: 97 %

## 2024-12-10 DIAGNOSIS — J40 BRONCHITIS: ICD-10-CM

## 2024-12-10 DIAGNOSIS — J06.9 VIRAL UPPER RESPIRATORY TRACT INFECTION: Primary | ICD-10-CM

## 2024-12-10 DIAGNOSIS — J45.31 MILD PERSISTENT ASTHMA WITH ACUTE EXACERBATION: ICD-10-CM

## 2024-12-10 PROCEDURE — 99214 OFFICE O/P EST MOD 30 MIN: CPT | Performed by: FAMILY MEDICINE

## 2024-12-10 RX ORDER — AZITHROMYCIN 250 MG/1
TABLET, FILM COATED ORAL
Qty: 6 TABLET | Refills: 0 | Status: CANCELLED | OUTPATIENT
Start: 2024-12-10 | End: 2024-12-15

## 2024-12-10 RX ORDER — ALBUTEROL SULFATE 0.83 MG/ML
2.5 SOLUTION RESPIRATORY (INHALATION) EVERY 6 HOURS PRN
Qty: 90 ML | Refills: 0 | Status: SHIPPED | OUTPATIENT
Start: 2024-12-10

## 2024-12-10 RX ORDER — AZITHROMYCIN 250 MG/1
TABLET, FILM COATED ORAL
Qty: 6 TABLET | Refills: 0 | Status: SHIPPED | OUTPATIENT
Start: 2024-12-10

## 2024-12-10 ASSESSMENT — PATIENT HEALTH QUESTIONNAIRE - PHQ9
10. IF YOU CHECKED OFF ANY PROBLEMS, HOW DIFFICULT HAVE THESE PROBLEMS MADE IT FOR YOU TO DO YOUR WORK, TAKE CARE OF THINGS AT HOME, OR GET ALONG WITH OTHER PEOPLE: NOT DIFFICULT AT ALL
SUM OF ALL RESPONSES TO PHQ QUESTIONS 1-9: 3
SUM OF ALL RESPONSES TO PHQ QUESTIONS 1-9: 3

## 2024-12-10 ASSESSMENT — PAIN SCALES - GENERAL: PAINLEVEL_OUTOF10: NO PAIN (0)

## 2024-12-10 ASSESSMENT — ASTHMA QUESTIONNAIRES
QUESTION_5 LAST FOUR WEEKS HOW WOULD YOU RATE YOUR ASTHMA CONTROL: POORLY CONTROLLED
QUESTION_2 LAST FOUR WEEKS HOW OFTEN HAVE YOU HAD SHORTNESS OF BREATH: MORE THAN ONCE A DAY
QUESTION_3 LAST FOUR WEEKS HOW OFTEN DID YOUR ASTHMA SYMPTOMS (WHEEZING, COUGHING, SHORTNESS OF BREATH, CHEST TIGHTNESS OR PAIN) WAKE YOU UP AT NIGHT OR EARLIER THAN USUAL IN THE MORNING: FOUR OR MORE NIGHTS A WEEK
QUESTION_1 LAST FOUR WEEKS HOW MUCH OF THE TIME DID YOUR ASTHMA KEEP YOU FROM GETTING AS MUCH DONE AT WORK, SCHOOL OR AT HOME: MOST OF THE TIME
ACT_TOTALSCORE: 8
QUESTION_4 LAST FOUR WEEKS HOW OFTEN HAVE YOU USED YOUR RESCUE INHALER OR NEBULIZER MEDICATION (SUCH AS ALBUTEROL): ONE OR TWO TIMES PER DAY
ACT_TOTALSCORE: 8

## 2024-12-10 NOTE — TELEPHONE ENCOUNTER
Reason for Call:  Appointment Request    Patient requesting this type of appt:  Pt is looking for a same day appt , along w/son with Bimal Pearl, PCP today. Currently has avery'd an appt today with another Dr    Requested provider: Bimal Pearl    Reason patient unable to be scheduled: Needs to be scheduled by clinic    When does patient want to be seen/preferred time: Same day    Comments: back to back appt with son    Could we send this information to you [facility] or would you prefer to receive a phone call?:   Patient would prefer a phone call   Okay to leave a detailed message?: Yes at Home number on file [phone] (home)    Call taken on 12/10/2024 at 7:31 AM by Kirstin Oh

## 2024-12-10 NOTE — TELEPHONE ENCOUNTER
Pt was seen today in clinic for a URI. Pt needs a nebulizer machine and supplies. Order has been pended.      Please call mom back once ordered. Thanks    Candie Pearl RN  [facility]

## 2024-12-10 NOTE — TELEPHONE ENCOUNTER
Left non detailed voicemail for patient asking that they callback  To provide [facility] [employer] information for nebulizer machine  Thanks,  Charlotte MIRZA RN

## 2024-12-10 NOTE — PROGRESS NOTES
"  Assessment & Plan     Viral upper respiratory tract infection  Triggered bronchitis and asthma exacerbation     Bronchitis  Will do a round of zithromax , she is not wheezing today but usually URI trigger her asthma and we discussed doing Albuterol nebz as they help her better than the Albuterol inhaler   - albuterol (PROVENTIL) (2.5 MG/3ML) 0.083% neb solution; Take 1 vial (2.5 mg) by nebulization every 6 hours as needed for shortness of breath, wheezing or cough.  - azithromycin (ZITHROMAX) 250 MG tablet; Take 2 tablets day one then one tablet daily for four days    Mild persistent asthma with acute exacerbation  As above , sent the Rx for the nebulizer and the neb solution as well   - albuterol (PROVENTIL) (2.5 MG/3ML) 0.083% neb solution; Take 1 vial (2.5 mg) by nebulization every 6 hours as needed for shortness of breath, wheezing or cough.  - Nebulizer and Supplies Order for DME - ONLY FOR DME          BMI  Estimated body mass index is 28.38 kg/m  as calculated from the following:    Height as of this encounter: 1.549 m (5' 1\").    Weight as of this encounter: 68.1 kg (150 lb 3.2 oz).         RTC if no improving or worsening.  Pt is aware  and comfortable with the current plan.      Mechelle Martinez is a 33 year old, presenting for the following health issues:  No chief complaint on file.      12/10/2024    10:14 AM   Additional Questions   Roomed by Karo DEVLIN   Accompanied by n/a     History of Present Illness       Reason for visit:  Sick  Symptom onset:  1-3 days ago She is missing 1 dose(s) of medications per week.         Acute Illness  Acute illness concerns: URI Symptoms   Onset/Duration: 3 Days ago  Symptoms:  Fever: No  Chills/Sweats: No  Headache (location?): YES  Sinus Pressure: YES  Conjunctivitis:  No  Ear Pain: YES: bilateral  Rhinorrhea: No  Congestion: No  Sore Throat: YES  Cough: YES-non-productive  Wheeze: YES  Decreased Appetite: No  Nausea: YES  Vomiting: YES  Diarrhea: " No  Dysuria/Freq.: No  Dysuria or Hematuria: No  Fatigue/Achiness: No  Sick/Strep Exposure: YES  Therapies tried and outcome: None        Review of Systems  Constitutional, HEENT, cardiovascular, pulmonary, GI, , musculoskeletal, neuro, skin, endocrine and psych systems are negative, except as otherwise noted.      Objective    There were no vitals taken for this visit.  There is no height or weight on file to calculate BMI.  Physical Exam   GENERAL: alert and no distress  EYES: Eyes grossly normal to inspection, PERRL and conjunctivae and sclerae normal  NECK: no adenopathy, no asymmetry, masses, or scars  RESP: lungs clear to auscultation - no rales, rhonchi or wheezes  CV: regular rate and rhythm, normal S1 S2, no S3 or S4, no murmur, click or rub, no peripheral edema  ABDOMEN: soft, nontender, no hepatosplenomegaly, no masses and bowel sounds normal  MS: no gross musculoskeletal defects noted, no edema  NEURO: Normal strength and tone, mentation intact and speech normal  PSYCH: mentation appears normal, affect normal/bright    No results found for any visits on 12/10/24.        Signed Electronically by: Aurora Youssef MD

## 2024-12-10 NOTE — TELEPHONE ENCOUNTER
Pt calling again regarding message below. Sent message to [facility]. Thanks    Candie Pearl RN  [facility]

## 2024-12-10 NOTE — LETTER
12/10/2024    Michelle Joshua   1990        To Whom it May Concern;    Please excuse Michelle Joshua from work from  until  because of illness .    Sincerely,        Aurora Youssef MD

## 2024-12-10 NOTE — TELEPHONE ENCOUNTER
Called pt.  Provided phone number for [employer] (see below)    PATIENT INSTRUCTIONS: If you did not receive this ordered item today, please contact [facility] [employer] for availability ([place] Locations: [phone], [place]: [phone]).   Start Date: 12/10/2024     Pt expressed understanding.   All questions were answered.     Kady ACOSTA RN

## 2024-12-19 ENCOUNTER — VIRTUAL VISIT (OUTPATIENT)
Dept: RHEUMATOLOGY | Facility: CLINIC | Age: 34
End: 2024-12-19
Attending: STUDENT IN AN ORGANIZED HEALTH CARE EDUCATION/TRAINING PROGRAM
Payer: COMMERCIAL

## 2024-12-19 DIAGNOSIS — I77.6 AORTITIS (H): Primary | ICD-10-CM

## 2024-12-19 DIAGNOSIS — M31.4 TAKAYASU'S ARTERITIS (H): ICD-10-CM

## 2024-12-19 DIAGNOSIS — M31.8 SYSTEMIC VASCULITIS (H): ICD-10-CM

## 2024-12-19 DIAGNOSIS — Z79.899 HIGH RISK MEDICATION USE: ICD-10-CM

## 2024-12-19 NOTE — LETTER
12/19/2024       RE: Michelle Joshua  [address]  [facility] [address]     Dear Colleague,    Thank you for referring your patient, Michelle Joshua, to the [facility] RHEUMATOLOGY at [facility]. Please see a copy of my visit note below.    RHEUMATOLOGY OUTPATIENT CLINIC NOTE     Referring Provider:   Bimal Pearl PA-C  [address]  [place] [address]     Lab review      RF:  Positive, 2022  CCP Ab:  Negative       TITA: Positive, 1: 160, homogenous  dsDNA: negative      Complement C3: normal  Complement C4: normal     ANCA by IFA: Negative   MPO: Negative  OK-3 Abs: Negative    Serum IgG4: Normal    Imaging review      CTA chest, abdomen, pelvis with contrast, 10/2024:  1. Non specific bilateral common iliac artery wall thickening, possible sequelae of arteritis. No adjacent fat stranding to suggest active inflammation. No stenosis, beading, or dissection. 2. Aortitis not suggested.  3. Enlarged aortic measurements:  A. Sinuses of Valsalva: 42 mm  B. Ascending thoracic aorta: 49 mm  C. Below superior mesenteric artery origin: 26 mm     4. Sub 6 mm pulmonary nodules. Per Fleischner Society recommendations,if the patient is at low risk for lung cancer, no follow up is needed.  If the patient is at high risk for lung cancer, optional chest CT in 12 months could be performed for reevaluation.    5. Ascending thoracic aortic aorta larger. 49 mm, previously 43 mm in 2022. Infrarenal aortic wall thickening measured 2.9 mm in thickness in  2022, less than 1 mm on 10/14/2024. Bilateral common iliac artery wall thickening is new from 2022.      CTA head and neck with contrast, 11/2024:  1. Head CTA demonstrates no aneurysm or stenosis of the major  intracranial arteries.   2. Neck CTA demonstrates no stenosis of the major cervical arteries      X-ray hands, 11/2014: No erosive disease     X-ray feet, 11/2014: No erosive change      CTA chest, abdomen, pelvis with contrast, 3/2023:  1. Thickening of the suprarenal and proximal infrarenal aortic wall and periaortic thickening/straning in the distal abdominal aorta consistent with aortitis.  2. Focal irregular enlargement of the ascending aorta and descending thoracic aorta. Maximum ascending aorta diameters are 43 x 42 mm with area/height ratio of 9.3 cm2/m. Maximum descending thoracic aorta diameters are 36 x 32 mm.  3. Moderate stenosis in the proximal inferior mesenteric artery.  4. Patent iliac and common femoral arteries.  5. Compared to prior study on 12/16/22: Maximum ascending aorta diameters have increased. Distal periaortic wall thickening and soft tissue stranding appears more prominent.     CT chest, abdomen, pelvis with contrast, 3/2022:   1. Prominent aorta thickening identified, noted of the distal ascending aorta throughout the aortic arch, descending thoracic aorta, and ending at the level of the renal arteries c/w aortitis.   A. The maximal thickening is at the level or just below the level of the diaphragm at 8-9 mm.   B. There is aneurysmal dilatation in the mid descending thoracic aorta up to 3.7 cm.   C. There is irregular aortitis-type change extending into the base of the brachiocephalic and left subclavian vessels.   D. No other branch vessels were definitely involved with aortitis changes. In-flow vessels appear normal in thickness and renal arteries are  widely patent.     CT abdomen pelvis with contrast, 1/2017:  No acute changes in the abdomen or pelvis to account for patient's symptoms. No bowel obstruction or diverticulitis. Appendix is normal.       Subjective    Visit date December 19, 2024    Michelle is a 33 year old female who presents today for follow up.    Since the last visit,    - Workup 10/2024 showed:    MPO: Negative  ND-3 Abs: Negative    Serum IgG4: Normal    CTA chest, abdomen, pelvis with contrast, 10/2024:  1. Non specific bilateral common iliac  artery wall thickening, possible sequelae of arteritis. No adjacent fat stranding to suggest active inflammation. No stenosis, beading, or dissection. 2. Aortitis not suggested.  3. Enlarged aortic measurements:  A. Sinuses of Valsalva: 42 mm  B. Ascending thoracic aorta: 49 mm  C. Below superior mesenteric artery origin: 26 mm     4. Sub 6 mm pulmonary nodules. Per Fleischner Society recommendations,if the patient is at low risk for lung cancer, no follow up is needed.  If the patient is at high risk for lung cancer, optional chest CT in 12 months could be performed for reevaluation.    5. Ascending thoracic aortic aorta larger. 49 mm, previously 43 mm in 2022. Infrarenal aortic wall thickening measured 2.9 mm in thickness in  2022, less than 1 mm on 10/14/2024. Bilateral common iliac artery wall thickening is new from 2022.      CTA head and neck with contrast, 11/2024:  1. Head CTA demonstrates no aneurysm or stenosis of the major  intracranial arteries.   2. Neck CTA demonstrates no stenosis of the major cervical arteries    Today, Michelle mentioned the following:    She started having ear fullness, cough and shortness of breath for one week, she denies fever, sick contact (Son). Her symptoms are stable today compared to prior and seems to be improving.  She was prescribed antibiotics by her primary care provider.    She has not seen ENT or pulmonary yet  She is scheduled with Opthalmology     She was on methotrexate up to last week and stopped due to the infection.   She was able to restart humira in late October, last took it around 2 weeks ago     She was doing well in November compared to prior. She did not have claudications pain at the time.     ROS    Current Medications   Methotrexate 25 mg weekly (Sundays)  Folic acid   Humira injection every 2 weeks     Past Medical history   Past Medical History:   Diagnosis Date     Uncomplicated asthma      Unspecified otitis media     Otitis Media as a child        Objective  LMP 11/18/2024 (Approximate)       PHYSICAL EXAMINATION  Physical Exam  Alert  Able to speak full sentence  No visible skin rash      Assessment & Plan    # Large vessel vasculitis  # Aortitis  # Radiographic thickening aorta [ascending, descending thoracic, abdominal]  # Radiographic mild ectasia left subclavian  # Radiographic thickening proximal inferior mesenteric  # Radiographic thickening of bilateral iliac arteries    # Exertional shortness of breath, history of asthma    # Recurrent ear infections  # Recurrent eye redness with photophobia  # Lower extremity tingling and numbness  # Negative ANCA serology, normal IgG4 level    # Family history of autoimmunity  # Chronic immunosuppression  # Screening for chronic infections  # High risk medication requiring frequent lab tests for toxicity monitoring  # Longitudinal care    # Large vessel vasculitis  # Aortitis  # Radiographic thickening aorta [ascending, descending thoracic, abdominal]  # Radiographic mild ectasia left subclavian  # Radiographic thickening proximal inferior mesenteric  # Radiographic thickening of bilateral iliac arteries     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      Workup for infectious vasculitis was negative, ANCA serologies was negative as well as IgG4 in the serum.    Unfortunately there has been intervals of time where she was not taking methotrexate or Humira and has been intermittently on prednisone, fortunately her symptoms were stable in November and early December after resuming methotrexate and Humira.    CTA of the head and neck did not show hemoterritorial involvement with vasculitis, CTA of the chest, abdomen and pelvis showed probably new thickening and bilateral  common iliac artery that was not seen in 2022 however the CT was not compared to the outside CT in 2023.  Nonetheless, since there has been periods of time when she was not on methotrexate on Humira therefore this cannot be classified as failure of these therapies and we will continue on the current therapy for the time being and await  the results of repeat CT scan in April 2025 which is 6-month from the current scan in October.    She was referred to ENT, pulmonary and ophthalmology in the previous visit however this remains to be pending.  I recommended that she schedule those consultations.     Plan:  - Continue methotrexate 25 mg weekly  - Continue folic acid 1 mg daily  - Continue Humira subcutaneous injection every 2 weeks  - Echocardiogram     I advised her to hold methotrexate and Humira during recurrent episode of infection as below.     #Recent cold infection  Recommended to hold methotrexate and Humira for 2 to 3 weeks at least and resume only if there are no signs of infection [lack of fever and improvement in cough and shortness of breath]    # Exertional shortness of breath, history of asthma  ANCA Negative.  IgE levels elevated, likely from concomitant asthma, less likely from IgG4   No reported parenchymal lung disease on CT imaging    Plan:  Pulmonary consultation.      # Recurrent ear infections  Agree with ENT referral recommended by primary care provider     # Recurrent eye redness with photophobia  Ophthalmology referral to rule out uveitis     # Lower extremity tingling and numbness  Neurology consultation     # Chronic immunosuppression  # Screening for chronic infections  2024  Hepatitis B surface antigen: Negative  Hepatitis B core: Negative  Hepatitis B surface antibody: Negative   Hepatitis C antibody: Negative   QuantiFERON gold: Negative     # High risk medication requiring frequent lab tests for toxicity monitoring  History of methotrexate and Humira use    Labs reviewed on 10/01/2024,  CBC,AST, Creatinine no signs of concerning toxicity   Standing orders in place      Plan:   MT pharmacy follow-up  Standing orders for monitoring        # Longitudinal care  The longitudinal plan of care for the diagnosis(es)/condition(s) as documented were addressed during this visit. Due to the added complexity in care, I will continue to support Michelle in the subsequent management and with ongoing continuity of care.       Review of the result(s) of each unique test - as HPI   Ordering of each unique test  Prescription drug management    35 minutes spent by me on the date of the encounter doing chart review, history and exam, documentation and further activities per the note      Return in about 2 months (around 2/19/2025) for Follow up.    René Stock MD  [facility] RHEUMATOLOGY      Virtual Visit Details     Type of service:  Video Visit     Joined the call at 12/19/2024, 10:20:00 am.  Left the call at 12/19/2024, 10:38:48 am.  You were on the call for 18 minutes 48 seconds .    Originating Location (pt. Location): Home  Distant Location (provider location):  On-site  Platform used for Video Visit: Evelyn          Again, thank you for allowing me to participate in the care of your patient.      Sincerely,    René Stock MD

## 2024-12-19 NOTE — PATIENT INSTRUCTIONS
Our plan for today is:    Schedule ENT appointment and pulmonary appointment and ophthalmology appointment    - Tests:    Schedule echocardiogram    - Medications:    Hold methotrexate and Humira for at least 2-3 weeks and resume if there are no signs of infections (no fever improvement in cough and shortness of breath)

## 2024-12-19 NOTE — Clinical Note
2-month follow-up, best if vasculitis slot, otherwise any slot to keep 2-month interval Labs 1 week before Schedule ENT consult previously ordered Schedule pulmonary consult previously ordered Thank you

## 2024-12-19 NOTE — PROGRESS NOTES
RHEUMATOLOGY OUTPATIENT CLINIC NOTE     Referring Provider:   Bimal Pearl PA-C  [address] [facility] [address]  [place] [address]     Lab review      RF:  Positive, 2022  CCP Ab:  Negative       TITA: Positive, 1: 160, homogenous  dsDNA: negative      Complement C3: normal  Complement C4: normal     ANCA by IFA: Negative   MPO: Negative  [place]-3 Abs: Negative    Serum IgG4: Normal    Imaging review      CTA chest, abdomen, pelvis with contrast, 10/2024:  1. Non specific bilateral common iliac artery wall thickening, possible sequelae of arteritis. No adjacent fat stranding to suggest active inflammation. No stenosis, beading, or dissection. 2. Aortitis not suggested.  3. Enlarged aortic measurements:  A. Sinuses of Valsalva: 42 mm  B. Ascending thoracic aorta: 49 mm  C. Below superior mesenteric artery origin: 26 mm     4. Sub 6 mm pulmonary nodules. Per Fleischner Society recommendations,if the patient is at low risk for lung cancer, no follow up is needed.  If the patient is at high risk for lung cancer, optional chest CT in 12 months could be performed for reevaluation.    5. Ascending thoracic aortic aorta larger. 49 mm, previously 43 mm in 2022. Infrarenal aortic wall thickening measured 2.9 mm in thickness in  2022, less than 1 mm on 10/14/2024. Bilateral common iliac artery wall thickening is new from 2022.      CTA head and neck with contrast, 11/2024:  1. Head CTA demonstrates no aneurysm or stenosis of the major  intracranial arteries.   2. Neck CTA demonstrates no stenosis of the major cervical arteries      X-ray hands, 11/2014: No erosive disease     X-ray feet, 11/2014: No erosive change     CTA chest, abdomen, pelvis with contrast, 3/2023:  1. Thickening of the suprarenal and proximal infrarenal aortic wall and periaortic thickening/straning in the distal abdominal aorta consistent with aortitis.  2. Focal irregular enlargement of the ascending aorta and descending thoracic aorta. Maximum  ascending aorta diameters are 43 x 42 mm with area/height ratio of 9.3 cm2/m. Maximum descending thoracic aorta diameters are 36 x 32 mm.  3. Moderate stenosis in the proximal inferior mesenteric artery.  4. Patent iliac and common femoral arteries.  5. Compared to prior study on 12/16/22: Maximum ascending aorta diameters have increased. Distal periaortic wall thickening and soft tissue stranding appears more prominent.     CT chest, abdomen, pelvis with contrast, 3/2022:   1. Prominent aorta thickening identified, noted of the distal ascending aorta throughout the aortic arch, descending thoracic aorta, and ending at the level of the renal arteries c/w aortitis.   A. The maximal thickening is at the level or just below the level of the diaphragm at 8-9 mm.   B. There is aneurysmal dilatation in the mid descending thoracic aorta up to 3.7 cm.   C. There is irregular aortitis-type change extending into the base of the brachiocephalic and left subclavian vessels.   D. No other branch vessels were definitely involved with aortitis changes. In-flow vessels appear normal in thickness and renal arteries are  widely patent.     CT abdomen pelvis with contrast, 1/2017:  No acute changes in the abdomen or pelvis to account for patient's symptoms. No bowel obstruction or diverticulitis. Appendix is normal.       Subjective     Visit date December 19, 2024    Michelle is a 33 year old female who presents today for follow up.    Since the last visit,    - Workup 10/2024 showed:    MPO: Negative  [place]-3 Abs: Negative    Serum IgG4: Normal    CTA chest, abdomen, pelvis with contrast, 10/2024:  1. Non specific bilateral common iliac artery wall thickening, possible sequelae of arteritis. No adjacent fat stranding to suggest active inflammation. No stenosis, beading, or dissection. 2. Aortitis not suggested.  3. Enlarged aortic measurements:  A. Sinuses of Valsalva: 42 mm  B. Ascending thoracic aorta: 49 mm  C. Below superior  mesenteric artery origin: 26 mm     4. Sub 6 mm pulmonary nodules. Per Fleischner Society recommendations,if the patient is at low risk for lung cancer, no follow up is needed.  If the patient is at high risk for lung cancer, optional chest CT in 12 months could be performed for reevaluation.    5. Ascending thoracic aortic aorta larger. 49 mm, previously 43 mm in 2022. Infrarenal aortic wall thickening measured 2.9 mm in thickness in  2022, less than 1 mm on 10/14/2024. Bilateral common iliac artery wall thickening is new from 2022.      CTA head and neck with contrast, 11/2024:  1. Head CTA demonstrates no aneurysm or stenosis of the major  intracranial arteries.   2. Neck CTA demonstrates no stenosis of the major cervical arteries    Today, Michelle mentioned the following:    She started having ear fullness, cough and shortness of breath for one week, she denies fever, sick contact (Son). Her symptoms are stable today compared to prior and seems to be improving.  She was prescribed antibiotics by her primary care provider.    She has not seen ENT or pulmonary yet  She is scheduled with Opthalmology     She was on methotrexate up to last week and stopped due to the infection.   She was able to restart humira in late October, last took it around 2 weeks ago     She was doing well in November compared to prior. She did not have claudications pain at the time.     ROS    Current Medications   Methotrexate 25 mg weekly (Sundays)  Folic acid   Humira injection every 2 weeks     Past Medical history   Past Medical History:   Diagnosis Date    Uncomplicated asthma     Unspecified otitis media     Otitis Media as a child       Objective   LMP 11/18/2024 (Approximate)       PHYSICAL EXAMINATION  Physical Exam  Alert  Able to speak full sentence  No visible skin rash      Assessment & Plan     # Large vessel vasculitis  # Aortitis  # Radiographic thickening aorta [ascending, descending thoracic, abdominal]  # Radiographic  mild ectasia left subclavian  # Radiographic thickening proximal inferior mesenteric  # Radiographic thickening of bilateral iliac arteries    # Exertional shortness of breath, history of asthma    # Recurrent ear infections  # Recurrent eye redness with photophobia  # Lower extremity tingling and numbness  # Negative ANCA serology, normal IgG4 level    # Family history of autoimmunity  # Chronic immunosuppression  # Screening for chronic infections  # High risk medication requiring frequent lab tests for toxicity monitoring  # Longitudinal care    # Large vessel vasculitis  # Aortitis  # Radiographic thickening aorta [ascending, descending thoracic, abdominal]  # Radiographic mild ectasia left subclavian  # Radiographic thickening proximal inferior mesenteric  # Radiographic thickening of bilateral iliac arteries     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      Workup for infectious vasculitis was negative, ANCA serologies was negative as well as IgG4 in the serum.    Unfortunately there has been intervals of time where she was not taking methotrexate or Humira and has been intermittently on prednisone, fortunately her symptoms were stable in November and early December after resuming methotrexate and Humira.    CTA of the head and neck did not show hemoterritorial involvement with vasculitis, CTA of the chest, abdomen and pelvis showed probably new thickening and bilateral common iliac artery that was not seen in 2022 however the CT was not compared to the outside CT in 2023.  Nonetheless, since there has been periods of time when she was not on methotrexate on Humira therefore this cannot be classified as failure of these therapies and we will continue on the current  therapy for the time being and await  the results of repeat CT scan in April 2025 which is 6-month from the current scan in October.    She was referred to ENT, pulmonary and ophthalmology in the previous visit however this remains to be pending.  I recommended that she schedule those consultations.     Plan:  - Continue methotrexate 25 mg weekly  - Continue folic acid 1 mg daily  - Continue Humira subcutaneous injection every 2 weeks  - Echocardiogram     I advised her to hold methotrexate and Humira during recurrent episode of infection as below.     #Recent cold infection  Recommended to hold methotrexate and Humira for 2 to 3 weeks at least and resume only if there are no signs of infection [lack of fever and improvement in cough and shortness of breath]    # Exertional shortness of breath, history of asthma  ANCA Negative.  IgE levels elevated, likely from concomitant asthma, less likely from IgG4   No reported parenchymal lung disease on CT imaging    Plan:  Pulmonary consultation.      # Recurrent ear infections  Agree with ENT referral recommended by primary care provider     # Recurrent eye redness with photophobia  Ophthalmology referral to rule out uveitis     # Lower extremity tingling and numbness  Neurology consultation     # Chronic immunosuppression  # Screening for chronic infections  2024  Hepatitis B surface antigen: Negative  Hepatitis B core: Negative  Hepatitis B surface antibody: Negative   Hepatitis C antibody: Negative   QuantiFERON gold: Negative     # High risk medication requiring frequent lab tests for toxicity monitoring  History of methotrexate and Humira use    Labs reviewed on 10/01/2024, CBC,AST, Creatinine no signs of concerning toxicity   Standing orders in place      Plan:   [facility] pharmacy follow-up  Standing orders for monitoring        # Longitudinal care  The longitudinal plan of care for the diagnosis(es)/condition(s) as documented were addressed during this visit. Due to the  added complexity in care, I will continue to support Michelle in the subsequent management and with ongoing continuity of care.       Review of the result(s) of each unique test - as HPI   Ordering of each unique test  Prescription drug management    35 minutes spent by me on the date of the encounter doing chart review, history and exam, documentation and further activities per the note      Return in about 2 months (around 2/19/2025) for Follow up.    René Stock MD  [facility] RHEUMATOLOGY      Virtual Visit Details     Type of service:  Video Visit     Joined the call at 12/19/2024, 10:20:00 am.  Left the call at 12/19/2024, 10:38:48 am.  You were on the call for 18 minutes 48 seconds .    Originating Location (pt. Location): Home  Distant Location (provider location):  On-site  Platform used for Video Visit: Evelyn

## 2024-12-19 NOTE — Clinical Note
2-month follow-up, best if vasculitis slot, otherwise any slot to keep 2-month interval Labs 1 week before Echocardiogram Schedule ENT consult previously ordered Schedule pulmonary consult previously ordered Thank you

## 2024-12-19 NOTE — Clinical Note
Jayson Huang, can you please set up a visit with her in 1 to 2 weeks to check-in on her respiratory infection?  She needs some handholding in the current time till she is stable. Thank you

## 2024-12-26 ENCOUNTER — TELEPHONE (OUTPATIENT)
Dept: PHARMACY | Facility: CLINIC | Age: 34
End: 2024-12-26
Payer: COMMERCIAL

## 2024-12-26 NOTE — TELEPHONE ENCOUNTER
Pt is due for follow up with Reina CAZARES regarding Hyrimoz/Humira approval    Call placed to pt to initiate scheduling on 12/26/24    Outcome: Pt states that the last time she took the medication was end of November 2024. Pt has since become ill, and with recent visit with Dr. Stock on 12/19/24, he advised her to stop Methotrexate and not take Humira for 3 weeks. Pt states she has a delivery of med scheduled for this coming Tuesday and is due for her dose first week of January 2025. Pt reports still not feeling well and states she is very congested and overall malaise. Dr. Camargo did write in his notes that he wants to see her back in February of 2025, and I provided the pt the clinic number to schedule that appt.     *Routing to Reina for further insight as to if pt should wait to be seen by her until after/closer to appt with Dayami.*

## 2025-01-07 ENCOUNTER — TELEPHONE (OUTPATIENT)
Dept: PHARMACY | Facility: CLINIC | Age: 35
End: 2025-01-07

## 2025-01-07 ENCOUNTER — MYC REFILL (OUTPATIENT)
Dept: PHARMACY | Facility: CLINIC | Age: 35
End: 2025-01-07

## 2025-01-07 DIAGNOSIS — I77.6 AORTITIS: ICD-10-CM

## 2025-01-07 DIAGNOSIS — J32.0 CHRONIC MAXILLARY SINUSITIS: ICD-10-CM

## 2025-01-07 DIAGNOSIS — M31.8 SYSTEMIC VASCULITIS (H): Primary | ICD-10-CM

## 2025-01-07 RX ORDER — FLUTICASONE PROPIONATE 50 MCG
2 SPRAY, SUSPENSION (ML) NASAL DAILY PRN
Qty: 16 G | Refills: 0 | Status: SHIPPED | OUTPATIENT
Start: 2025-01-07

## 2025-01-07 RX ORDER — FLUTICASONE PROPIONATE 50 MCG
2 SPRAY, SUSPENSION (ML) NASAL DAILY PRN
Qty: 16 G | Refills: 0 | OUTPATIENT
Start: 2025-01-07

## 2025-01-07 NOTE — TELEPHONE ENCOUNTER
Returned call to patient. Advised completing medication monitoring labs today despite holding methotrexate and Humira for last couple weeks due to infection. Ensured all orders active for today's lab draw.    Reviewed patient completed azithromycin course which has not helped symptoms much. Still having congestion, runny nose, ear fullness, productive cough, and drainage. Using nebulizers which is helping. Had to miss her PCP visit this morning due to her children also being sick and prioritizing their visit at the same time as hers. Wondering what can be done outside of antibiotics to help.    Recommend:  - Continuing albuterol nebulizers four times daily as needed   - Restarting Flonase nasal spray 2 sprays in each nostril daily as needed (sent new script for 1 bottle so OTC can be covered under insurance)  - Start Mucinex 1-2 times daily as needed for cough

## 2025-01-07 NOTE — TELEPHONE ENCOUNTER
Health Call Center    Phone Message    May a detailed message be left on voicemail: yes     Reason for Call: Other: Pt called, she is requesting a call back from Jeff CAZARES to discuss her medications and lack of improvement in symptoms. Writer noted pt has [facility] telephone appt tomorrow 1/8 @ 10:00 am w/ Renia although pt would like to get labs done today @ 1pm because she is sick but pending discussion w/ Reina. Please call pt back asap. Thanks!      Action Taken: Other: RHEUM    Travel Screening: Not Applicable     Date of Service:

## 2025-01-08 ENCOUNTER — VIRTUAL VISIT (OUTPATIENT)
Dept: PHARMACY | Facility: CLINIC | Age: 35
End: 2025-01-08
Attending: STUDENT IN AN ORGANIZED HEALTH CARE EDUCATION/TRAINING PROGRAM
Payer: COMMERCIAL

## 2025-01-08 DIAGNOSIS — I77.6 AORTITIS: ICD-10-CM

## 2025-01-08 DIAGNOSIS — J32.0 CHRONIC MAXILLARY SINUSITIS: ICD-10-CM

## 2025-01-08 DIAGNOSIS — M31.8 SYSTEMIC VASCULITIS (H): Primary | ICD-10-CM

## 2025-01-08 DIAGNOSIS — J40 BRONCHITIS: ICD-10-CM

## 2025-01-09 DIAGNOSIS — R06.02 SOB (SHORTNESS OF BREATH): Primary | ICD-10-CM

## 2025-01-09 NOTE — PROGRESS NOTES
Medication Therapy Management (MTM) Encounter    ASSESSMENT:                            Medication Adherence/Access: No issues identified.    Systemic Vasculitis/Aoritis: Due to ongoing infection, patient is not currently taking immunomodulatory therapy. Recommend continuing to hold until reassessed by PCP next week. Provided phone numbers for scheduling needed appointments. Recommended calling today to schedule for next available opening.    Bronchitis/Chronic Sinusitis: Recommend starting fluticasone 50 mcg 2 sprays in each nostril daily to help current symptoms. Also recommend continuing nebulizer treatments and using Mucinex 600 mg twice daily as needed for current symptoms. Encouraged patient to complete PCP visit as scheduled next week due to ongoing symptoms after antibiotic course.    PLAN:                            1. Please take the following medications to help symptomatically treat your current bronchitis/sinusitis symptoms:  - Albuterol nebulizers four times daily as needed   - Flonase nasal spray 2 sprays in each nostril daily as needed   - Mucinex 600 mg 1-2 times daily as needed for cough    2. Please call and make the following appointments:  - Pulmonology: [phone]   - Echocardiogram: Qgnmuse-[phone], [facility]- [phone]     Follow-up: Return in 15 days (on 1/23/2025) for MTM Pharmacist Visit.    SUBJECTIVE/OBJECTIVE:                          Michelle Joshua is a 34 year old female seen for a follow-up visit.       Reason for visit: Bronchitis supportive therapy follow up, questions on coordination of appointments    Allergies/ADRs: Reviewed in chart  Past Medical History: Reviewed in chart  Tobacco: She reports that she has been smoking cigarettes. She has never used smokeless tobacco.Nicotine/Tobacco Cessation Plan  Not discussed today  Alcohol: 1-3 beverages / week    Medication Adherence/Access: no issues reported.    Systemic Vasculitis/Aoritis:   Humira 40 mg every 14 days (holding  currently)  Methotrexate 25 mg weekly (holding currently)  Folic acid 1 mg daily  Atorvastatin 20 mg daily  Losartan 50 mg daily     Reports she was diagnosed with Takayasu arteritis in 2022 and previously had taken prednisone, Humira, and methotrexate. Restarted Humira and methotrexate however has been holding since 12/12/24 due to bronchitis. Notes she was tolerating medications well prior to holding them. Was told she needs to set up an echocardiogram, ENT, and pulmonology visits at last rheumatology follow up. Has an ENT appointment scheduled - has not scheduled other appointments and needs phone numbers to complete this.    Liver Function Studies -   Recent Labs   Lab Test 01/07/25  1312 12/12/20  0000 01/31/17 2031   PROTTOTAL  --   --  7.1   ALBUMIN  --   --  3.6   BILITOTAL  --   --  0.4   ALKPHOS  --   --  91   AST 31   < > 20   ALT 15   < > 58*    < > = values in this interval not displayed.      CBC RESULTS:   Recent Labs   Lab Test 01/07/25  1312   WBC 12.9*   RBC 4.60   HGB 16.0*   HCT 44.6   MCV 97   MCH 34.8*   MCHC 35.9   RDW 12.6         Bronchitis/Chronic Sinusitis:  Fluticasone 50 mcg nasal spray 2 sprays in each nostril daily (not currently taking)  Albuterol 2.5 mg nebs four times daily as needed     Reports she completed azithromycin course which has not helped symptoms much. Still having congestion, runny nose, ear fullness, productive cough, and drainage. Using nebulizers which is helping. Resent fluticasone to pharmacy yesterday - has not had a chance to  yet. Does have another PCP visit next week (1/14) to discuss ongoing symptoms.    Today's Vitals: LMP 11/18/2024 (Approximate)   ----------------    I spent 21 minutes with this patient today. All changes were made via collaborative practice agreement with René Stock.     A summary of these recommendations was sent via [employer].    Reina Rodriguez, PharmD  Medication Therapy Management Pharmacist  [facility]  Rheumatology Clinic  Phone: [phone]    Telemedicine Visit Details  The patient's medications can be safely assessed via a telemedicine encounter.  Type of service:  Telephone visit  Originating Location (pt. Location): Home    Distant Location (provider location):  Off-site  Start Time: 10:00 AM  End Time: 10:21 AM     Medication Therapy Recommendations  Chronic maxillary sinusitis   1 Current Medication: fluticasone (FLONASE) 50 MCG/ACT nasal spray   Current Medication Sig: Spray 2 sprays into both nostrils daily as needed for rhinitis or allergies.   Rationale: Synergistic therapy - Needs additional medication therapy - Indication   Recommendation: Start Medication - Mucinex 600 MG Tb12 - 600 mg twice daily as needed with albuterol nebs and Flonase   Status: Accepted - no CPA Needed   Identified Date: 1/8/2025 Completed Date: 1/8/2025

## 2025-01-09 NOTE — PATIENT INSTRUCTIONS
"Recommendations from today's MTM visit:                                                       1. Please take the following medications to help symptomatically treat your current bronchitis/sinusitis symptoms:  - Albuterol nebulizers four times daily as needed   - Flonase nasal spray 2 sprays in each nostril daily as needed   - Mucinex 600 mg 1-2 times daily as needed for cough    2. Please call and make the following appointments:  - Pulmonology: [phone]   - Echocardiogram: Yiyypqd-[phone], [facility]- [phone]    Follow-up: Return in 15 days (on 1/23/2025) for MTM Pharmacist Visit.    It was great speaking with you today.  I value your experience and would be very thankful for your time in providing feedback in our clinic survey. In the next few days, you may receive an email or text message from [employer] with a link to a survey related to your  clinical pharmacist.\"     To schedule another MTM appointment, please call the clinic directly or you may call the MTM scheduling line at [phone].    My Clinical Pharmacist's contact information:                                                      Please feel free to contact me with any questions or concerns you have.      Reina Rodriguez, PharmD  Medication Therapy Management Pharmacist  [facility] Rheumatology Clinic  Phone: [phone]     "

## 2025-01-14 ENCOUNTER — PRE VISIT (OUTPATIENT)
Dept: NEUROLOGY | Facility: CLINIC | Age: 35
End: 2025-01-14

## 2025-01-14 ENCOUNTER — VIRTUAL VISIT (OUTPATIENT)
Dept: NEUROLOGY | Facility: CLINIC | Age: 35
End: 2025-01-14
Payer: COMMERCIAL

## 2025-01-14 DIAGNOSIS — R20.2 PARESTHESIAS: Primary | ICD-10-CM

## 2025-01-14 DIAGNOSIS — M31.4 TAKAYASU'S ARTERITIS (H): ICD-10-CM

## 2025-01-14 NOTE — LETTER
1/14/2025       RE: Michelle Joshua  [address] [facility] [address]     Dear Colleague,    Thank you for referring your patient, Michelle Joshua, to the [facility] [place] at [facility]. Please see a copy of my visit note below.    [facility] Neurology Consultation    Michelle Joshua MRN# [id number]   Age: 34 year old YOB: 1990     Requesting physician: Bimal Ellison     Reason for Consultation: paresthesias      History of Presenting Symptoms:   Michelle Joshua is a 34 year old female who presents today for evaluation of paresthesias.  The patient has a pertinent medical history of large vessel vasculitis, asthma, and smoking history.    The patient is followed with vascular medicine and rheumatology for her vasculitis.  She initially presented 3/26/2022 with chest pain, revealing elevated CRP and ESR and CT PE showing wall thickening of aortic arch descending thoracic and abdominal aorta, and aneurysmal dilatation of descending aorta, left subclavian, and brachiocephalic.  Initially she was started on prednisone 40 mg and then Methotrexate 10 mg Qweek with vascular specialists. With rheumatologist, Dr. Gregorio, she was started continued on prednisone, methorexate was increased to 25 mg, and she was started on Humira.  In a 10/1/2024 visit with new rheumatology providers, she noted having dyspnea recurrently with asthma and prior PNA, she had repeated ear infections, and was reporting numbness and tingling in both legs w/out foot drop. 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 Further testing with ANCA, IgG4 disease, HIV, Tuberculosis, and syphilitic aortitis was unrevealing.  At her last follow up with rheumatology providers, 12/19/2024, a referral to neurology was made for ongoing tingling in the lower extremities.    The patient does confirm that she has had longstanding  numbness and tingling in her legs and feet, and is unsure if this relates to arthritis.  When describing arthritis, she has pain in joints (hands, knees) with popping of her joints. When describing paresthesias, she describes this often occurs when sitting for short periods of time and having pressure on her legs/thighs that leads to the sensations. From the spot of pressure distally, she has sensory numbness and tingling. There can be discoloration, often reported as a darker tint of purple/pink in the extremities.  She can get the same sensation of paresthesias in her arms if she has prolonged periods of pressure at her elbows, or with shoulder abduction in either arm.  The sensations of paresthesias are otherwise not present when walking or when no pressure is applied to limbs.  She does feel her toes may be numb at most times though.  There can be strength issues when her paresthesias are present, and she demonstrates issues of wrist extension/flexion difficulties and dorsiflexion difficulties.  The symptoms have been present for 5-6 years.  The symptoms have been transient and not necessarily progressive over time.  There is no facial issues, dysphagia, diplopia, or neck pain or back pain associated overall.     She has no history of oral ulcers.  She does wear glasses and doesn't report monocular vision changes.  She doesn't report a history of brain or spinal trauma, or infection.      Social History:   No alcohol abuse. She does smoke, and is working to cut down on smoking.       Medications:   Adalimumab  Albuterol  Atorvastatin  Azithromycin  Folic acid  Loratadine  Losartan  Methorexate     Physical Exam:   General: Seated comfortably in no acute distress.  Neurologic:     Mental Status: Fully alert, attentive and oriented. Speech clear and fluent, no paraphasic errors.     Cranial Nerves: EOMI with normal smooth pursuit. Facial movements symmetric. Hearing not formally tested but intact to conversation.   No dysarthria.     Motor: No tremors or other abnormal movements observed.      Sensory:Negative Romberg.      Coordination: Finger-nose-finger without dysmetria.     Gait: Normal, steady casual gait.         Data: Pertinent prior to visit   Imaging:  CTA head and neck: 11/1/2024:  Impression:    1. Head CTA demonstrates no aneurysm or stenosis of the major intracranial arteries.   2. Neck CTA demonstrates no stenosis of the major cervical arteries.    CTA chest/abdomen/pelvis: 10/14/2024:  1. Non specific bilateral common iliac artery wall thickening, possible sequelae of arteritis. No adjacent fat stranding to suggest active inflammation. No stenosis, beading, or dissection.  2. Aortitis not suggested.  3. Enlarged aortic measurements:  A. Sinuses of Valsalva: 42 mm B. Ascending thoracic aorta: 49 mm C. Below superior mesenteric artery origin: 26 mm  4. Sub 6 mm pulmonary nodules. Per Fleischner Society recommendations, if the patient is at low risk for lung cancer, no follow up is needed. If the patient is at high risk for lung cancer, optional chest CT in 12 months could be performed for reevaluation.    CTA chest/abdomen/pelvis: 3/30/2022:  1. Prominent aorta thickening identified, noted of the distal ascending  aorta throughout the aortic arch, descending thoracic aorta, and ending at  the level of the renal arteries c/w aortitis.    A. The maximal thickening is at the level or just below the level of the  diaphragm at 8-9 mm.    B. There is aneurysmal dilatation in the mid descending thoracic aorta up to 3.7 cm.    C. There is irregular aortitis-type change extending into the base of the brachiocephalic and left subclavian vessels.    D. No other branch vessels were definitely involved with aortitis changes. In-flow vessels appear normal in thickness and renal arteries are  widely patent.     Laboratory:  10/1-22/2024:  High ~ IgE (2,266)  Normal ~ CK, Histoplasma, ALT, AST, C3, C4, Treponema, CRP, ESR,  Immunoglobulins IgG subclass, Immunoglobulins A/G/M, MPO, SPEP, Quant TB, Hep C/B testing    12/16/2022:  Elevated ~ ESR (61), CRP (1.23)  Normal ~ Anti-RNA polymerase III    9/27/2022:  Normal ~ red cell morphology, manual differential,  ds-DNA    3/30-31/2022:  Abnormal ~ Rh factor (20.6), TITA (1:160, homogenous), ALT (103), AST (106)  Normal ~ CCP, C3, C4, IgG subclasses         Assessment and Plan:   Assessment:  Paresthesias    The patient has mostly transient paresthesias in extremities distal to regions of localized compression (elbows, knees, thighs, forearms) with some skin discoloration. I suspect these issues are more related to reduced blood flow leading to transient ischemia.  However, she also indicates having continued numbness in her toes, which I do not suspect is related to ischemia but instead could be a part of a distal symmetric polyneuropathy.  Given the patient's Takayasu arteritis, the concern at the forefront is of vasculitis neuropathy.  The first step in her work up would be to look for common reversible causes of neuropathy (CTS, Ulnar on EMG. Nutritional and metabolic derangements), and then consider additional testing (skin biopsy, nerve biopsy) pending results or other symptom progression.    Plan:  - B12, B6, Vitamin D, TSH, folate  - EMG (one arm, one leg)    Follow up in Neurology clinic in 3 months, or should new concerns arise.    HANSA Aguiar D.O.   of Neurology    Total time today (62 min) in this patient encounter was spent on pre-charting, counseling and/or coordination of care.  The patient is in agreement with this plan and has no further questions. The longitudinal plan of care for the diagnosis(es)/condition(s) as documented were addressed during this visit. Due to the added complexity in care, I will continue to support Michelle in the subsequent management and with ongoing continuity of care.        Virtual Visit Details    Type of service:  Video  Visit   Video Start Time:  1047  Video End Time:11:30 AM    Originating Location (pt. Location): Home    Distant Location (provider location):  On-site  Platform used for Video Visit: Evelyn      Again, thank you for allowing me to participate in the care of your patient.      Sincerely,    Oj Aguiar, DO

## 2025-01-14 NOTE — PROGRESS NOTES
Virtual Visit Details    Type of service:  Video Visit   Video Start Time:  1047  Video End Time:11:30 AM    Originating Location (pt. Location): Home    Distant Location (provider location):  On-site  Platform used for Video Visit: FrancescaWell

## 2025-01-14 NOTE — PROGRESS NOTES
[facility] Neurology Consultation    Michelle Joshua MRN# [id number]   Age: 34 year old YOB: 1990     Requesting physician: Bimal Ellison     Reason for Consultation: paresthesias      History of Presenting Symptoms:   Michelle Joshua is a 34 year old female who presents today for evaluation of paresthesias.  The patient has a pertinent medical history of large vessel vasculitis, asthma, and smoking history.    The patient is followed with vascular medicine and rheumatology for her vasculitis.  She initially presented 3/26/2022 with chest pain, revealing elevated CRP and ESR and CT PE showing wall thickening of aortic arch descending thoracic and abdominal aorta, and aneurysmal dilatation of descending aorta, left subclavian, and brachiocephalic.  Initially she was started on prednisone 40 mg and then Methotrexate 10 mg Qweek with vascular specialists. With rheumatologist, Dr. Gregorio, she was started continued on prednisone, methorexate was increased to 25 mg, and she was started on Humira.  In a 10/1/2024 visit with new rheumatology providers, she noted having dyspnea recurrently with asthma and prior PNA, she had repeated ear infections, and was reporting numbness and tingling in both legs w/out foot drop. 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 Further testing with ANCA, IgG4 disease, HIV, Tuberculosis, and syphilitic aortitis was unrevealing.  At her last follow up with rheumatology providers, 12/19/2024, a referral to neurology was made for ongoing tingling in the lower extremities.    The patient does confirm that she has had longstanding numbness and tingling in her legs and feet, and is unsure if this relates to arthritis.  When describing arthritis, she has pain in joints (hands, knees) with popping of her joints. When describing paresthesias, she describes this often occurs when sitting for short periods of time and having pressure on her legs/thighs  that leads to the sensations. From the spot of pressure distally, she has sensory numbness and tingling. There can be discoloration, often reported as a darker tint of purple/pink in the extremities.  She can get the same sensation of paresthesias in her arms if she has prolonged periods of pressure at her elbows, or with shoulder abduction in either arm.  The sensations of paresthesias are otherwise not present when walking or when no pressure is applied to limbs.  She does feel her toes may be numb at most times though.  There can be strength issues when her paresthesias are present, and she demonstrates issues of wrist extension/flexion difficulties and dorsiflexion difficulties.  The symptoms have been present for 5-6 years.  The symptoms have been transient and not necessarily progressive over time.  There is no facial issues, dysphagia, diplopia, or neck pain or back pain associated overall.     She has no history of oral ulcers.  She does wear glasses and doesn't report monocular vision changes.  She doesn't report a history of brain or spinal trauma, or infection.      Social History:   No alcohol abuse. She does smoke, and is working to cut down on smoking.       Medications:   Adalimumab  Albuterol  Atorvastatin  Azithromycin  Folic acid  Loratadine  Losartan  Methorexate     Physical Exam:   General: Seated comfortably in no acute distress.  Neurologic:     Mental Status: Fully alert, attentive and oriented. Speech clear and fluent, no paraphasic errors.     Cranial Nerves: EOMI with normal smooth pursuit. Facial movements symmetric. Hearing not formally tested but intact to conversation.  No dysarthria.     Motor: No tremors or other abnormal movements observed.      Sensory:Negative Romberg.      Coordination: Finger-nose-finger without dysmetria.     Gait: Normal, steady casual gait.         Data: Pertinent prior to visit   Imaging:  CTA head and neck: 11/1/2024:  Impression:    1. Head CTA  demonstrates no aneurysm or stenosis of the major intracranial arteries.   2. Neck CTA demonstrates no stenosis of the major cervical arteries.    CTA chest/abdomen/pelvis: 10/14/2024:  1. Non specific bilateral common iliac artery wall thickening, possible sequelae of arteritis. No adjacent fat stranding to suggest active inflammation. No stenosis, beading, or dissection.  2. Aortitis not suggested.  3. Enlarged aortic measurements:  A. Sinuses of Valsalva: 42 mm B. Ascending thoracic aorta: 49 mm C. Below superior mesenteric artery origin: 26 mm  4. Sub 6 mm pulmonary nodules. Per Fleischner Society recommendations, if the patient is at low risk for lung cancer, no follow up is needed. If the patient is at high risk for lung cancer, optional chest CT in 12 months could be performed for reevaluation.    CTA chest/abdomen/pelvis: 3/30/2022:  1. Prominent aorta thickening identified, noted of the distal ascending  aorta throughout the aortic arch, descending thoracic aorta, and ending at  the level of the renal arteries c/w aortitis.    A. The maximal thickening is at the level or just below the level of the  diaphragm at 8-9 mm.    B. There is aneurysmal dilatation in the mid descending thoracic aorta up to 3.7 cm.    C. There is irregular aortitis-type change extending into the base of the brachiocephalic and left subclavian vessels.    D. No other branch vessels were definitely involved with aortitis changes. In-flow vessels appear normal in thickness and renal arteries are  widely patent.     Laboratory:  10/1-22/2024:  High ~ IgE (2,266)  Normal ~ CK, Histoplasma, ALT, AST, C3, C4, Treponema, CRP, ESR, Immunoglobulins IgG subclass, Immunoglobulins A/G/M, MPO, SPEP, Quant TB, Hep C/B testing    12/16/2022:  Elevated ~ ESR (61), CRP (1.23)  Normal ~ Anti-RNA polymerase III    9/27/2022:  Normal ~ red cell morphology, manual differential,  ds-DNA    3/30-31/2022:  Abnormal ~ Rh factor (20.6), TITA (1:160,  homogenous), ALT (103), AST (106)  Normal ~ CCP, C3, C4, IgG subclasses         Assessment and Plan:   Assessment:  Paresthesias    The patient has mostly transient paresthesias in extremities distal to regions of localized compression (elbows, knees, thighs, forearms) with some skin discoloration. I suspect these issues are more related to reduced blood flow leading to transient ischemia.  However, she also indicates having continued numbness in her toes, which I do not suspect is related to ischemia but instead could be a part of a distal symmetric polyneuropathy.  Given the patient's Takayasu arteritis, the concern at the forefront is of vasculitis neuropathy.  The first step in her work up would be to look for common reversible causes of neuropathy (CTS, Ulnar on EMG. Nutritional and metabolic derangements), and then consider additional testing (skin biopsy, nerve biopsy) pending results or other symptom progression.    Plan:  - B12, B6, Vitamin D, TSH, folate  - EMG (one arm, one leg)    Follow up in Neurology clinic in 3 months, or should new concerns arise.    HANSA Aguiar D.O.   of Neurology    Total time today (62 min) in this patient encounter was spent on pre-charting, counseling and/or coordination of care.  The patient is in agreement with this plan and has no further questions. The longitudinal plan of care for the diagnosis(es)/condition(s) as documented were addressed during this visit. Due to the added complexity in care, I will continue to support Michelle in the subsequent management and with ongoing continuity of care.

## 2025-01-14 NOTE — NURSING NOTE
Current patient location[place] [address] [address]    Is the patient currently in the state of [place]? YES    Visit mode: VIDEO    If the visit is dropped, the patient can be reconnected by:TELEPHONE VISIT: Phone number:   Telephone Information:   Mobile [phone]       Will anyone else be joining the visit? NO  (If patient encounters technical issues they should call [phone])    Are changes needed to the allergy or medication list? Pt stated no med changes    Are refills needed on medications prescribed by this physician? NO    Rooming Documentation:  Questionnaire(s) completed    Reason for visit: No chief complaint on file.    Tara KRAUS

## 2025-01-28 ENCOUNTER — PHARMACY VISIT (OUTPATIENT)
Dept: ADMINISTRATIVE | Facility: CLINIC | Age: 35
End: 2025-01-28
Payer: COMMERCIAL

## 2025-01-28 NOTE — PROGRESS NOTES
[facility] Inflammatory Other Clinical Follow Up Assessment  I spoke with Michelle for [facility] TM assessment.     Current Regimen: Humira 40mg every other week.     Denies SE. She mentioned she held Humira for about a month due to ear infection that was not healing. She is better now and has resumed doses. PDC: 0.39.     Updated pt phone number in [employer] today.     Takayasu arteritis: Did not discuss in detail today. Pt did not have any concerns.     Pt looks to be followed by [facility] Huang but no future appt scheduled yet. Next Rheum appt 2/28/25.    TM will follow up next quarter to check on adherence.     Jessica Awad, Pharm.D.[facility]  [address] [address]  [email or web address]  Office: [phone]

## 2025-02-11 ENCOUNTER — OFFICE VISIT (OUTPATIENT)
Dept: PULMONOLOGY | Facility: CLINIC | Age: 35
End: 2025-02-11
Payer: COMMERCIAL

## 2025-02-11 VITALS
OXYGEN SATURATION: 99 % | SYSTOLIC BLOOD PRESSURE: 148 MMHG | WEIGHT: 150 LBS | BODY MASS INDEX: 28.32 KG/M2 | HEART RATE: 101 BPM | HEIGHT: 61 IN | DIASTOLIC BLOOD PRESSURE: 80 MMHG

## 2025-02-11 DIAGNOSIS — R76.8 ELEVATED IGE LEVEL: Primary | ICD-10-CM

## 2025-02-11 DIAGNOSIS — Z11.59 NEED FOR HEPATITIS C SCREENING TEST: ICD-10-CM

## 2025-02-11 DIAGNOSIS — R06.02 SOB (SHORTNESS OF BREATH): ICD-10-CM

## 2025-02-11 DIAGNOSIS — J45.909 UNCOMPLICATED ASTHMA, UNSPECIFIED ASTHMA SEVERITY, UNSPECIFIED WHETHER PERSISTENT: ICD-10-CM

## 2025-02-11 LAB — HGB BLD-MCNC: 13.4 G/DL

## 2025-02-11 PROCEDURE — 85018 HEMOGLOBIN: CPT | Mod: QW

## 2025-02-11 RX ORDER — MONTELUKAST SODIUM 10 MG/1
10 TABLET ORAL AT BEDTIME
Qty: 90 TABLET | Refills: 6 | Status: SHIPPED | OUTPATIENT
Start: 2025-02-11

## 2025-02-11 RX ORDER — FLUTICASONE FUROATE AND VILANTEROL 200; 25 UG/1; UG/1
1 POWDER RESPIRATORY (INHALATION) DAILY
Qty: 60 EACH | Refills: 6 | Status: SHIPPED | OUTPATIENT
Start: 2025-02-11

## 2025-02-11 NOTE — LETTER
2/11/2025      Michelle Joshua  [address] [facility] [address]      Dear Colleague,    Thank you for referring your patient, Michelle Joshua, to the [facility]. Please see a copy of my visit note below.    Pulmonary Clinic Outpatient Consultation    Assessment and Plan:   34F with a history of asthma, vasculitis, here for evaluation of SOB. PFTs were normal. CT chest did not show any parenchymal abnormalities. Her IgE is markedly elevated. Overall she appears to have poorly controlled asthma, likely TH2-driven with IgE-mediated inflammation. No peripheral eosinophilia. She also has multiple other complicating issues including some type of vasculitis, possible underlying heart disease and ear and sinus issues. We will optimize her inhaler regimen and refer her to allergy/immunology to discuss biologic therapy. We discussed the importance of smoking cessation. Lung exam and SpO2 are normal today.     Recommendations:  - stop the flovent  - start high dose Breo ellipta; 1 puff once daily. Rinse/gargle/spit after use  - continue albuterol nebulizer and/or inhaler as needed  - allergy/immunology referral placed  - smoking cessation encouraged. Declines varenicline. Rx for nicotine gum given.  - start montelukast 10mg daily for better allergy control  - schedule echocardiogram ASAP  - follow up with ENT on 2/21/25 as scheduled.   - declines flu shot. PCV20 today. Defer other vaccinations to her PMD.     Follow up in 1 month for reassessment.    The longitudinal plan of care for the diagnosis(es)/condition(s) as documented were addressed during this visit. Due to the added complexity in care, I will continue to support Michelle in the subsequent management and with ongoing continuity of care.       Sav Grimm MD (Avi)  [facility] ([facility])  Send me a secure message using [employer] [phone]  Fax [phone]     CCx: shortness of  breath    HPI: 34F with a history of asthma, vasculitis, here for evaluation of SOB. Her asthma control worsened after she became pregnant in 2010.   She was on prednisone for a year for her heart and vasculitis issues; she was taken off last year. Her asthma control worsened after she was taken off prednisone. She has chronic dyspnea, cough and wheezing. She has chronic sinus congestion and sinus issues.   She has many allergies. She is allergic to dust, pollen, etc.     She smokes 1 pack per day. She has tried to quit in the past without success.  She is originally from [place].     ROS:  A 12-system review was obtained and was negative with the exception of the symptoms endorsed in the history of present illness.    PMH:  Past Medical History:   Diagnosis Date     Uncomplicated asthma      Unspecified otitis media     Otitis Media as a child        PSH:  Past Surgical History:   Procedure Laterality Date     HC REMOVE TONSILS/ADENOIDS,<13 Y/O      T & A <11y.o.     ZZHC CREATE EARDRUM OPENING,GEN ANESTH      P.E. Tubes       Allergies:  Allergies   Allergen Reactions     Aspirin Cough     Other Reaction(s): Runny Nose     Acetaminophen      Amoxicillin Hives     Amoxicillin-Pot Clavulanate Hives     Bees      Pcn [Penicillins]      Sulfa Antibiotics Hives     Sulfasalazine        Family HX:  Family History   Problem Relation Age of Onset     Hypertension Mother      Anxiety Disorder Mother      Depression Mother      Depression Sister      Anxiety Disorder Sister      Mental Illness Sister         bipolar       Social Hx:  Social History     Socioeconomic History     Marital status: Single     Spouse name: Not on file     Number of children: 0     Years of education: 10     Highest education level: Not on file   Occupational History     Occupation: student   Tobacco Use     Smoking status: Every Day     Current packs/day: 0.50     Types: Cigarettes     Smokeless tobacco: Never   Vaping Use     Vaping status:  Former   Substance and Sexual Activity     Alcohol use: Yes     Comment: occaionally.     Drug use: No     Sexual activity: Yes     Partners: Male   Other Topics Concern     Parent/sibling w/ CABG, MI or angioplasty before 65F 55M? No   Social History Narrative             Social Drivers of Health     Financial Resource Strain: Low Risk  (11/17/2023)    Financial Resource Strain      Within the past 12 months, have you or your family members you live with been unable to get utilities (heat, electricity) when it was really needed?: No   Recent Concern: Financial Resource Strain - High Risk (10/13/2023)    Financial Resource Strain      Within the past 12 months, have you or your family members you live with been unable to get utilities (heat, electricity) when it was really needed?: Yes   Food Insecurity: Low Risk  (11/17/2023)    Food Insecurity      Within the past 12 months, did you worry that your food would run out before you got money to buy more?: No      Within the past 12 months, did the food you bought just not last and you didn t have money to get more?: No   Recent Concern: Food Insecurity - High Risk (10/13/2023)    Food Insecurity      Within the past 12 months, did you worry that your food would run out before you got money to buy more?: No      Within the past 12 months, did the food you bought just not last and you didn t have money to get more?: Yes   Transportation Needs: Low Risk  (11/17/2023)    Transportation Needs      Within the past 12 months, has lack of transportation kept you from medical appointments, getting your medicines, non-medical meetings or appointments, work, or from getting things that you need?: No   Physical Activity: Not on file   Stress: Not on file   Social Connections: Unknown (3/30/2022)    Received from [facility], [facility]    Social Connections      Frequency of Communication with Friends and Family:  "Not on file   Interpersonal Safety: Not on file   Housing Stability: Low Risk  (11/17/2023)    Housing Stability      Do you have housing? : Yes      Are you worried about losing your housing?: No       Current Meds:  Current Outpatient Medications   Medication Sig Dispense Refill     Adalimumab 40 MG/0.4ML AJKT Inject 40 mg subcutaneously every 14 days. 0.8 each 5     albuterol (PROAIR HFA/PROVENTIL HFA/VENTOLIN HFA) 108 (90 Base) MCG/ACT inhaler Inhale 2 puffs into the lungs every 6 hours as needed for shortness of breath, wheezing or cough. 18 g 0     albuterol (PROVENTIL) (2.5 MG/3ML) 0.083% neb solution Take 1 vial (2.5 mg) by nebulization every 6 hours as needed for shortness of breath, wheezing or cough. 90 mL 0     atorvastatin (LIPITOR) 20 MG tablet Take 1 tablet (20 mg) by mouth daily. 90 tablet 1     EPINEPHrine (ANY BX GENERIC EQUIV) 0.3 MG/0.3ML injection 2-pack Inject 0.3 mLs (0.3 mg) into the muscle as needed for anaphylaxis May repeat one time in 5-15 minutes if response to initial dose is inadequate. 2 each 11     fluticasone (FLONASE) 50 MCG/ACT nasal spray Spray 2 sprays into both nostrils daily as needed for rhinitis or allergies. 16 g 0     fluticasone (FLOVENT HFA) 220 MCG/ACT inhaler Inhale 1 puff into the lungs 2 times daily. 12 g 0     folic acid (FOLVITE) 1 MG tablet Take 1 tablet (1 mg) by mouth daily. 90 tablet 3     loratadine (CLARITIN) 10 MG tablet Take 1 tablet (10 mg) by mouth daily 90 tablet 3     losartan (COZAAR) 100 MG tablet Take 1 tablet (100 mg) by mouth daily. 90 tablet 1     methotrexate 2.5 MG tablet Take 10 tablets (25 mg) by mouth every 7 days. 40 tablet 5     azithromycin (ZITHROMAX) 250 MG tablet Take 2 tablets day one then one tablet daily for four days (Patient not taking: Reported on 2/11/2025) 6 tablet 0       Physical Exam:  BP (!) 148/80   Pulse 101   Ht 1.549 m (5' 1\")   Wt 68 kg (150 lb)   SpO2 99%   BMI 28.34 kg/m    Gen: alert, oriented, no " distress  HEENT: nasal turbinates are unremarkable, no oropharyngeal lesions, no cervical or supraclavicular lymphadenopathy  CV: RRR, nl S1/S2, gallop noted most audible in the RUSB  Resp: CTAB, no focal crackles or wheezes  Skin: no apparent rashes  Ext: no cyanosis, clubbing or edema  Neuro: alert, nonfocal    Labs:  Reviewed  C3, C4 wnl  MPO ab neg  PR3 ab neg  Hgb 13.4  IgE 2266  Other IG levels and IgG subclasses wnl  No significant peripheral eosinophilia; last absolute eos 300 on 25.     Imaging studies:  Personally reviewed the CT chest    CTA CHEST, ABDOMEN, PELVIS 10/14/2024 5:26 PM     CLINICAL HISTORY: aortitis for follow up; Aortitis (H).      COMPARISONS: CT abdomen pelvis 2017     REFERRING PROVIDER: RICH SMITH     TECHNIQUE: Unenhanced CT chest, abdomen, pelvis. After the uneventful  administration of intravenous contrast, EKG gated CTA aortic root. CTA  chest, abdomen, pelvis. Coronal and sagittal reconstructions were  produced. Lung MIP produced.     CONTRAST: 124 mL Isovue 370     DOSE (DLP): 1754 mGy*cm     FINDINGS: CTA: No periaortic thickening or stranding. No beading. No  dissection. No stenosis.     Patent aorta. Normal branching pattern. Right innominate artery  measures 15 mm in diameter. Right innominate, subclavian, and carotid,  left carotid, and left subclavian arteries patent. Bilateral vertebral  arteries patent.     Celiac, superior mesenteric, bilateral single renal, and inferior  mesenteric arteries patent. Replaced right hepatic artery originates  from the superior mesenteric artery.     Non specific bilateral common iliac artery wall thickening. Adjacent  fat is without stranding. Bilateral common, internal, and external  iliac arteries patent. Left corona mortis. Bilateral common femoral  arteries patent.     Aortic measurements:  Sinuses of Valsalva: 42 mm  Sinotubular junction: 37 mm  Ascendin mm  Arch: 29 mm  Proximal descendin mm  Mid  descendin mm  Diaphragm: 28 mm  Below superior mesenteric origin: 26 mm  Infrarenal: 18 mm  Above bifurcation: 15 mm     Right common iliac artery: 12 mm     Left common iliac artery: 12 mm     CT: 3 mm right middle lobe nodule. 2 mm left lower lobe pleural based  nodule.      Subcutaneous air bubbles in the right lower quadrant.     Right ovarian follicles or cysts.                                                                      IMPRESSION:  1. Non specific bilateral common iliac artery wall thickening,  possible sequelae of arteritis. No adjacent fat stranding to suggest  active inflammation. No stenosis, beading, or dissection.     2. Aortitis not suggested.     3. Enlarged aortic measurements:  A. Sinuses of Valsalva: 42 mm  B. Ascending thoracic aorta: 49 mm  C. Below superior mesenteric artery origin: 26 mm     4. Sub 6 mm pulmonary nodules. Per Fleischner Society recommendations,  if the patient is at low risk for lung cancer, no follow up is needed.  If the patient is at high risk for lung cancer, optional chest CT in  12 months could be performed for reevaluation.    Pulmonary Function Testing  2025  FEV1 2.49L, 96%  FVC 2.94L, 96%  Ratio 0.85  No BD testing done.  TLC 4.3L, 98%  Dlco 105% cary for hgb  F/v curve appears normal.    Again, thank you for allowing me to participate in the care of your patient.        Sincerely,        Sav Grimm MD    Electronically signed

## 2025-02-11 NOTE — PROGRESS NOTES
Pulmonary Clinic Outpatient Consultation    Assessment and Plan:   34F with a history of asthma, vasculitis, here for evaluation of SOB. PFTs were normal. CT chest did not show any parenchymal abnormalities. Her IgE is markedly elevated. Overall she appears to have poorly controlled asthma, likely TH2-driven with IgE-mediated inflammation. No peripheral eosinophilia. She also has multiple other complicating issues including some type of vasculitis, possible underlying heart disease and ear and sinus issues. We will optimize her inhaler regimen and refer her to allergy/immunology to discuss biologic therapy. We discussed the importance of smoking cessation. Lung exam and SpO2 are normal today.     Recommendations:  - stop the flovent  - start high dose Breo ellipta; 1 puff once daily. Rinse/gargle/spit after use  - continue albuterol nebulizer and/or inhaler as needed  - allergy/immunology referral placed  - smoking cessation encouraged. Declines varenicline. Rx for nicotine gum given.  - start montelukast 10mg daily for better allergy control  - schedule echocardiogram ASAP  - follow up with ENT on 2/21/25 as scheduled.   - declines flu shot. PCV20 today. Defer other vaccinations to her PMD.     Follow up in 1 month for reassessment.    The longitudinal plan of care for the diagnosis(es)/condition(s) as documented were addressed during this visit. Due to the added complexity in care, I will continue to support Michelle in the subsequent management and with ongoing continuity of care.       Sav Grimm MD (Avi)  [facility] Pulmonary [facility] Critical [facility] ([facility])  Send me a secure message using [facility] [phone]  Fax [phone]     CCx: shortness of breath    HPI: 34F with a history of asthma, vasculitis, here for evaluation of SOB. Her asthma control worsened after she became pregnant in 2010.   She was on prednisone for a year for her heart and vasculitis issues; she was taken off last year.  Her asthma control worsened after she was taken off prednisone. She has chronic dyspnea, cough and wheezing. She has chronic sinus congestion and sinus issues.   She has many allergies. She is allergic to dust, pollen, etc.     She smokes 1 pack per day. She has tried to quit in the past without success.  She is originally from [place].     ROS:  A 12-system review was obtained and was negative with the exception of the symptoms endorsed in the history of present illness.    PMH:  Past Medical History:   Diagnosis Date    Uncomplicated asthma     Unspecified otitis media     Otitis Media as a child        PSH:  Past Surgical History:   Procedure Laterality Date    HC REMOVE TONSILS/ADENOIDS,<12 Y/O      T & A <11y.o.    ZZHC CREATE EARDRUM OPENING,GEN ANESTH      P.E. Tubes       Allergies:  Allergies   Allergen Reactions    Aspirin Cough     Other Reaction(s): Runny Nose    Acetaminophen     Amoxicillin Hives    Amoxicillin-Pot Clavulanate Hives    Bees     Pcn [Penicillins]     Sulfa Antibiotics Hives    Sulfasalazine        Family HX:  Family History   Problem Relation Age of Onset    Hypertension Mother     Anxiety Disorder Mother     Depression Mother     Depression Sister     Anxiety Disorder Sister     Mental Illness Sister         bipolar       Social Hx:  Social History     Socioeconomic History    Marital status: Single     Spouse name: Not on file    Number of children: 0    Years of education: 10    Highest education level: Not on file   Occupational History    Occupation: student   Tobacco Use    Smoking status: Every Day     Current packs/day: 0.50     Types: Cigarettes    Smokeless tobacco: Never   Vaping Use    Vaping status: Former   Substance and Sexual Activity    Alcohol use: Yes     Comment: occaionally.    Drug use: No    Sexual activity: Yes     Partners: Male   Other Topics Concern    Parent/sibling w/ CABG, MI or angioplasty before 65F 55M? No   Social History Narrative              Social Drivers of Health     Financial Resource Strain: Low Risk  (11/17/2023)    Financial Resource Strain     Within the past 12 months, have you or your family members you live with been unable to get utilities (heat, electricity) when it was really needed?: No   Recent Concern: Financial Resource Strain - High Risk (10/13/2023)    Financial Resource Strain     Within the past 12 months, have you or your family members you live with been unable to get utilities (heat, electricity) when it was really needed?: Yes   Food Insecurity: Low Risk  (11/17/2023)    Food Insecurity     Within the past 12 months, did you worry that your food would run out before you got money to buy more?: No     Within the past 12 months, did the food you bought just not last and you didn t have money to get more?: No   Recent Concern: Food Insecurity - High Risk (10/13/2023)    Food Insecurity     Within the past 12 months, did you worry that your food would run out before you got money to buy more?: No     Within the past 12 months, did the food you bought just not last and you didn t have money to get more?: Yes   Transportation Needs: Low Risk  (11/17/2023)    Transportation Needs     Within the past 12 months, has lack of transportation kept you from medical appointments, getting your medicines, non-medical meetings or appointments, work, or from getting things that you need?: No   Physical Activity: Not on file   Stress: Not on file   Social Connections: Unknown (3/30/2022)    Received from [facility], [facility]    Social Connections     Frequency of Communication with Friends and Family: Not on file   Interpersonal Safety: Not on file   Housing Stability: Low Risk  (11/17/2023)    Housing Stability     Do you have housing? : Yes     Are you worried about losing your housing?: No       Current Meds:  Current Outpatient Medications   Medication Sig Dispense  "Refill    Adalimumab 40 MG/0.4ML AJKT Inject 40 mg subcutaneously every 14 days. 0.8 each 5    albuterol (PROAIR HFA/PROVENTIL HFA/VENTOLIN HFA) 108 (90 Base) MCG/ACT inhaler Inhale 2 puffs into the lungs every 6 hours as needed for shortness of breath, wheezing or cough. 18 g 0    albuterol (PROVENTIL) (2.5 MG/3ML) 0.083% neb solution Take 1 vial (2.5 mg) by nebulization every 6 hours as needed for shortness of breath, wheezing or cough. 90 mL 0    atorvastatin (LIPITOR) 20 MG tablet Take 1 tablet (20 mg) by mouth daily. 90 tablet 1    EPINEPHrine (ANY BX GENERIC EQUIV) 0.3 MG/0.3ML injection 2-pack Inject 0.3 mLs (0.3 mg) into the muscle as needed for anaphylaxis May repeat one time in 5-15 minutes if response to initial dose is inadequate. 2 each 11    fluticasone (FLONASE) 50 MCG/ACT nasal spray Spray 2 sprays into both nostrils daily as needed for rhinitis or allergies. 16 g 0    fluticasone (FLOVENT HFA) 220 MCG/ACT inhaler Inhale 1 puff into the lungs 2 times daily. 12 g 0    folic acid (FOLVITE) 1 MG tablet Take 1 tablet (1 mg) by mouth daily. 90 tablet 3    loratadine (CLARITIN) 10 MG tablet Take 1 tablet (10 mg) by mouth daily 90 tablet 3    losartan (COZAAR) 100 MG tablet Take 1 tablet (100 mg) by mouth daily. 90 tablet 1    methotrexate 2.5 MG tablet Take 10 tablets (25 mg) by mouth every 7 days. 40 tablet 5    azithromycin (ZITHROMAX) 250 MG tablet Take 2 tablets day one then one tablet daily for four days (Patient not taking: Reported on 2/11/2025) 6 tablet 0       Physical Exam:  BP (!) 148/80   Pulse 101   Ht 1.549 m (5' 1\")   Wt 68 kg (150 lb)   SpO2 99%   BMI 28.34 kg/m    Gen: alert, oriented, no distress  HEENT: nasal turbinates are unremarkable, no oropharyngeal lesions, no cervical or supraclavicular lymphadenopathy  CV: RRR, nl S1/S2, gallop noted most audible in the RUSB  Resp: CTAB, no focal crackles or wheezes  Skin: no apparent rashes  Ext: no cyanosis, clubbing or edema  Neuro: " alert, nonfocal    Labs:  Reviewed  C3, C4 wnl  MPO ab neg  PR3 ab neg  Hgb 13.4  IgE 2266  Other IG levels and IgG subclasses wnl  No significant peripheral eosinophilia; last absolute eos 300 on 25.     Imaging studies:  Personally reviewed the CT chest    CTA CHEST, ABDOMEN, PELVIS 10/14/2024 5:26 PM     CLINICAL HISTORY: aortitis for follow up; Aortitis (H).      COMPARISONS: CT abdomen pelvis 2017     REFERRING PROVIDER: RICH SMITH     TECHNIQUE: Unenhanced CT chest, abdomen, pelvis. After the uneventful  administration of intravenous contrast, EKG gated CTA aortic root. CTA  chest, abdomen, pelvis. Coronal and sagittal reconstructions were  produced. Lung MIP produced.     CONTRAST: 124 mL Isovue 370     DOSE (DLP): 1754 mGy*cm     FINDINGS: CTA: No periaortic thickening or stranding. No beading. No  dissection. No stenosis.     Patent aorta. Normal branching pattern. Right innominate artery  measures 15 mm in diameter. Right innominate, subclavian, and carotid,  left carotid, and left subclavian arteries patent. Bilateral vertebral  arteries patent.     Celiac, superior mesenteric, bilateral single renal, and inferior  mesenteric arteries patent. Replaced right hepatic artery originates  from the superior mesenteric artery.     Non specific bilateral common iliac artery wall thickening. Adjacent  fat is without stranding. Bilateral common, internal, and external  iliac arteries patent. Left corona mortis. Bilateral common femoral  arteries patent.     Aortic measurements:  Sinuses of Valsalva: 42 mm  Sinotubular junction: 37 mm  Ascendin mm  Arch: 29 mm  Proximal descendin mm  Mid descendin mm  Diaphragm: 28 mm  Below superior mesenteric origin: 26 mm  Infrarenal: 18 mm  Above bifurcation: 15 mm     Right common iliac artery: 12 mm     Left common iliac artery: 12 mm     CT: 3 mm right middle lobe nodule. 2 mm left lower lobe pleural based  nodule.      Subcutaneous air  bubbles in the right lower quadrant.     Right ovarian follicles or cysts.                                                                      IMPRESSION:  1. Non specific bilateral common iliac artery wall thickening,  possible sequelae of arteritis. No adjacent fat stranding to suggest  active inflammation. No stenosis, beading, or dissection.     2. Aortitis not suggested.     3. Enlarged aortic measurements:  A. Sinuses of Valsalva: 42 mm  B. Ascending thoracic aorta: 49 mm  C. Below superior mesenteric artery origin: 26 mm     4. Sub 6 mm pulmonary nodules. Per Fleischner Society recommendations,  if the patient is at low risk for lung cancer, no follow up is needed.  If the patient is at high risk for lung cancer, optional chest CT in  12 months could be performed for reevaluation.    Pulmonary Function Testing  2/11/2025  FEV1 2.49L, 96%  FVC 2.94L, 96%  Ratio 0.85  No BD testing done.  TLC 4.3L, 98%  Dlco 105% cary for hgb  F/v curve appears normal.

## 2025-02-12 LAB
DLCOCOR-%PRED-PRE: 105 %
DLCOCOR-PRE: 21.05 ML/MIN/MMHG
DLCOUNC-%PRED-PRE: 105 %
DLCOUNC-PRE: 21.05 ML/MIN/MMHG
DLCOUNC-PRED: 19.92 ML/MIN/MMHG
ERV-%PRED-PRE: 51 %
ERV-PRE: 0.56 L
ERV-PRED: 1.1 L
EXPTIME-PRE: 6.75 SEC
FEF2575-%PRED-PRE: 103 %
FEF2575-PRE: 3.08 L/SEC
FEF2575-PRED: 2.96 L/SEC
FEFMAX-%PRED-PRE: 98 %
FEFMAX-PRE: 6.37 L/SEC
FEFMAX-PRED: 6.49 L/SEC
FEV1-%PRED-PRE: 96 %
FEV1-PRE: 2.49 L
FEV1FEV6-PRE: 85 %
FEV1FEV6-PRED: 85 %
FEV1FVC-PRE: 85 %
FEV1FVC-PRED: 85 %
FEV1SVC-PRE: 86 %
FEV1SVC-PRED: 80 %
FIFMAX-PRE: 4.01 L/SEC
FRCPLETH-%PRED-PRE: 79 %
FRCPLETH-PRE: 1.95 L
FRCPLETH-PRED: 2.46 L
FVC-%PRED-PRE: 96 %
FVC-PRE: 2.94 L
FVC-PRED: 3.04 L
IC-%PRED-PRE: 106 %
IC-PRE: 2.35 L
IC-PRED: 2.2 L
RVPLETH-%PRED-PRE: 103 %
RVPLETH-PRE: 1.39 L
RVPLETH-PRED: 1.34 L
TLCPLETH-%PRED-PRE: 98 %
TLCPLETH-PRE: 4.3 L
TLCPLETH-PRED: 4.35 L
VA-%PRED-PRE: 94 %
VA-PRE: 4.06 L
VC-%PRED-PRE: 90 %
VC-PRE: 2.91 L
VC-PRED: 3.22 L

## 2025-02-21 PROBLEM — J32.9 SINUSITIS, CHRONIC: Status: ACTIVE | Noted: 2025-02-21

## 2025-02-24 ENCOUNTER — MYC MEDICAL ADVICE (OUTPATIENT)
Dept: PULMONOLOGY | Facility: CLINIC | Age: 35
End: 2025-02-24
Payer: COMMERCIAL

## 2025-02-24 DIAGNOSIS — J45.909 UNCOMPLICATED ASTHMA, UNSPECIFIED ASTHMA SEVERITY, UNSPECIFIED WHETHER PERSISTENT: ICD-10-CM

## 2025-02-25 RX ORDER — FLUTICASONE FUROATE AND VILANTEROL 200; 25 UG/1; UG/1
1 POWDER RESPIRATORY (INHALATION) DAILY
Qty: 60 EACH | Refills: 11 | Status: SHIPPED | OUTPATIENT
Start: 2025-02-25

## 2025-02-25 NOTE — TELEPHONE ENCOUNTER
REFERRAL INFORMATION:  Referring By:   Referring Clinic:   Reason for Visit/Diagnosis: Consult- new slot with Jemima Woo or community provider with Hearing test before vist      FUTURE VISIT INFORMATION:  Appointment Date: 6/12/25  Appointment Time: 1:10 PM     NOTES STATUS DETAILS   OFFICE NOTE from referring provider Internal 2/21/25 -Manuel Perez MD    IMAGING  *pertaining image & report*       CT, MRI, PET, NM, US, XRAYS PACS [facility]  11/1/24- CTA Head Neck     [facility]   12/13/20 CT head        oral

## 2025-03-01 DIAGNOSIS — I35.1 AORTIC VALVE INSUFFICIENCY, ETIOLOGY OF CARDIAC VALVE DISEASE UNSPECIFIED: ICD-10-CM

## 2025-03-01 DIAGNOSIS — I77.6 AORTITIS: Primary | ICD-10-CM

## 2025-03-01 DIAGNOSIS — M31.4 TAKAYASU'S ARTERITIS (H): ICD-10-CM

## 2025-03-01 DIAGNOSIS — I34.0 MITRAL VALVE INSUFFICIENCY, UNSPECIFIED ETIOLOGY: ICD-10-CM

## 2025-03-03 ENCOUNTER — TELEPHONE (OUTPATIENT)
Dept: CARDIOLOGY | Facility: CLINIC | Age: 35
End: 2025-03-03
Payer: COMMERCIAL

## 2025-03-03 NOTE — TELEPHONE ENCOUNTER
Patient Contacted for the patient to call back and schedule the following:    Appointment type: NEW CARD  Provider: MARGARET  Return date: 05/169/25  Specialty phone number: [phone]  Additional appointment(s) needed: N/A  Additonal Notes: N/A

## 2025-03-04 ENCOUNTER — TELEPHONE (OUTPATIENT)
Dept: RHEUMATOLOGY | Facility: CLINIC | Age: 35
End: 2025-03-04
Payer: COMMERCIAL

## 2025-03-04 NOTE — TELEPHONE ENCOUNTER
LM with patient to review results and reschedule for no show rheumatology appointment     Antonia Angeles RN

## 2025-03-04 NOTE — TELEPHONE ENCOUNTER
----- Message from René Stock sent at 3/1/2025  1:35 PM CST -----  Valvular heart disease present, recommend cardiology evaluation   Rheumatology appointment was no-show, recommend rescheduling     René Stock MD

## 2025-03-04 NOTE — TELEPHONE ENCOUNTER
Patient returned call and is requesting appointment to be scheduled for a video visit. Writer sent message to provider to advise.     Antonia Angeles RN

## 2025-03-05 NOTE — TELEPHONE ENCOUNTER
RECORDS RECEIVED FROM:    DATE RECEIVED:    NOTES STATUS DETAILS   OFFICE NOTE from referring provider  Internal Dr René Sy @ [facility] Rheumatology:  3/3/25 telephone encounter  12/19/24   MEDICATION LIST Internal    GENERAL CARDIO RECORDS   (ALL APPOINTMENT TYPES)     LABS (CBC,BMP,CMP, TSH) Internal    EKG (STRIPS & REPORTS) Care Everywhere [facility]:  3/26/22   ECHOS (IMAGES AND REPORTS) Internal [facility]:  2/27/25  10/31/24   CT/CTA (IMAGES AND REPORTS) PACS [facility]:  CTA Chest 10/14/24    [facility]:  CTA Chest 3/27/23

## 2025-03-06 ENCOUNTER — LAB (OUTPATIENT)
Dept: LAB | Facility: CLINIC | Age: 35
End: 2025-03-06
Payer: COMMERCIAL

## 2025-03-06 ENCOUNTER — VIRTUAL VISIT (OUTPATIENT)
Dept: RHEUMATOLOGY | Facility: CLINIC | Age: 35
End: 2025-03-06
Attending: STUDENT IN AN ORGANIZED HEALTH CARE EDUCATION/TRAINING PROGRAM
Payer: COMMERCIAL

## 2025-03-06 ENCOUNTER — TELEPHONE (OUTPATIENT)
Dept: RHEUMATOLOGY | Facility: CLINIC | Age: 35
End: 2025-03-06

## 2025-03-06 VITALS — WEIGHT: 145 LBS | BODY MASS INDEX: 27.38 KG/M2 | HEIGHT: 61 IN

## 2025-03-06 DIAGNOSIS — M31.8 SYSTEMIC VASCULITIS (H): ICD-10-CM

## 2025-03-06 DIAGNOSIS — M31.8 SYSTEMIC VASCULITIS (H): Primary | ICD-10-CM

## 2025-03-06 DIAGNOSIS — I77.6 AORTITIS: ICD-10-CM

## 2025-03-06 DIAGNOSIS — I34.0 MITRAL VALVE INSUFFICIENCY, UNSPECIFIED ETIOLOGY: ICD-10-CM

## 2025-03-06 DIAGNOSIS — I35.1 AORTIC VALVE INSUFFICIENCY, ETIOLOGY OF CARDIAC VALVE DISEASE UNSPECIFIED: ICD-10-CM

## 2025-03-06 LAB
ALBUMIN UR-MCNC: NEGATIVE MG/DL
APPEARANCE UR: CLEAR
BACTERIA #/AREA URNS HPF: ABNORMAL /HPF
BASOPHILS # BLD AUTO: 0.1 10E3/UL (ref 0–0.2)
BASOPHILS NFR BLD AUTO: 1 %
BILIRUB UR QL STRIP: NEGATIVE
COLOR UR AUTO: YELLOW
EOSINOPHIL # BLD AUTO: 0.5 10E3/UL (ref 0–0.7)
EOSINOPHIL NFR BLD AUTO: 8 %
ERYTHROCYTE [DISTWIDTH] IN BLOOD BY AUTOMATED COUNT: 13 % (ref 10–15)
ERYTHROCYTE [SEDIMENTATION RATE] IN BLOOD BY WESTERGREN METHOD: 30 MM/HR (ref 0–20)
GLUCOSE UR STRIP-MCNC: NEGATIVE MG/DL
HCT VFR BLD AUTO: 38.4 % (ref 35–47)
HGB BLD-MCNC: 13 G/DL (ref 11.7–15.7)
HGB UR QL STRIP: NEGATIVE
IMM GRANULOCYTES # BLD: 0 10E3/UL
IMM GRANULOCYTES NFR BLD: 0 %
KETONES UR STRIP-MCNC: NEGATIVE MG/DL
LEUKOCYTE ESTERASE UR QL STRIP: NEGATIVE
LYMPHOCYTES # BLD AUTO: 2.5 10E3/UL (ref 0.8–5.3)
LYMPHOCYTES NFR BLD AUTO: 39 %
MCH RBC QN AUTO: 33.1 PG (ref 26.5–33)
MCHC RBC AUTO-ENTMCNC: 33.9 G/DL (ref 31.5–36.5)
MCV RBC AUTO: 98 FL (ref 78–100)
MONOCYTES # BLD AUTO: 0.4 10E3/UL (ref 0–1.3)
MONOCYTES NFR BLD AUTO: 6 %
NEUTROPHILS # BLD AUTO: 2.9 10E3/UL (ref 1.6–8.3)
NEUTROPHILS NFR BLD AUTO: 47 %
NITRATE UR QL: NEGATIVE
NRBC # BLD AUTO: 0 10E3/UL
NRBC BLD AUTO-RTO: 0 /100
PH UR STRIP: 6 [PH] (ref 5–7)
PLATELET # BLD AUTO: 310 10E3/UL (ref 150–450)
RBC # BLD AUTO: 3.93 10E6/UL (ref 3.8–5.2)
RBC #/AREA URNS AUTO: ABNORMAL /HPF
SP GR UR STRIP: 1.02 (ref 1–1.03)
SQUAMOUS #/AREA URNS AUTO: ABNORMAL /LPF
UROBILINOGEN UR STRIP-ACNC: 0.2 E.U./DL
WBC # BLD AUTO: 6.3 10E3/UL (ref 4–11)
WBC #/AREA URNS AUTO: ABNORMAL /HPF

## 2025-03-06 RX ORDER — FOLIC ACID 1 MG/1
1 TABLET ORAL DAILY
Qty: 90 TABLET | Refills: 3 | Status: SHIPPED | OUTPATIENT
Start: 2025-03-06

## 2025-03-06 ASSESSMENT — PAIN SCALES - GENERAL: PAINLEVEL_OUTOF10: NO PAIN (0)

## 2025-03-06 NOTE — TELEPHONE ENCOUNTER
Called pt to schedule 2 mo follow-up, per Dr. Stock, vasculitis slot. Pt preferred phone number has no voicemail capability. LVM w/ instructions to call back to schedule. Sending T[employer]Up message also.  Mar Pagan [place]

## 2025-03-06 NOTE — LETTER
3/6/2025       RE: Michelle Joshua  [address]  [place] [facility] [address]     Dear Colleague,    Thank you for referring your patient, Michelle Joshua, to the [facility] RHEUMATOLOGY at [facility]. Please see a copy of my visit note below.    Virtual Visit Details    Type of service:  Video Visit     Joined the call at 3/6/2025, 8:32:37 am.  Left the call at 3/6/2025, 9:12:22 am.  You were on the call for 39 minutes 45 seconds    Originating Location (pt. Location): Home    Distant Location (provider location):  On-site  Platform used for Video Visit: [facility]      RHEUMATOLOGY OUTPATIENT CLINIC NOTE     Referring Provider:   Bimal Pearl PA-C  [address] [facility] [address]  [place] [address]     Lab review      RF:  Positive, 20 (normal less than 12) 2022  CCP Ab:  Negative       TITA: Positive, 1: 160, homogenous  dsDNA: negative      Complement C3: normal  Complement C4: normal     ANCA by IFA: Negative   MPO: Negative  LA-3 Abs: Negative    Serum IgG4: Normal    Imaging review     Echo heart 2/2025:  Left ventricular function is decreased. The ejection fraction is 50-55% (borderline).  Global right ventricular function is normal.  Moderate mitral insufficiency is present.  Moderate aortic insufficiency is present.  Aortic root and ascending dilatation is present. Sinuses of Valsalva 3.8 cm (index 2.2). Ascending aorta 4.6 cm (indexed at 2.8cm/m2).  IVC diameter <2.1 cm collapsing >50% with sniff suggests a normal RA pressure of 3 mmHg.  No pericardial effusion is present.     CTA chest, abdomen, pelvis with contrast, 10/2024:  1. Non specific bilateral common iliac artery wall thickening, possible sequelae of arteritis. No adjacent fat stranding to suggest active inflammation. No stenosis, beading, or dissection. 2. Aortitis not suggested.  3. Enlarged aortic measurements:  A. Sinuses of Valsalva: 42 mm  B. Ascending thoracic aorta: 49 mm  C.  Below superior mesenteric artery origin: 26 mm  4. Sub 6 mm pulmonary nodules. Per Fleischner Society recommendations,if the patient is at low risk for lung cancer, no follow up is needed. If the patient is at high risk for lung cancer, optional chest CT in 12 months could be performed for reevaluation.  5. Ascending thoracic aortic aorta larger. 49 mm, previously 43 mm in 2022. Infrarenal aortic wall thickening measured 2.9 mm in thickness in 2022, less than 1 mm on 10/14/2024. Bilateral common iliac artery wall thickening is new from 2022.      CTA head and neck with contrast, 11/2024:  1. Head CTA demonstrates no aneurysm or stenosis of the major intracranial arteries.   2. Neck CTA demonstrates no stenosis of the major cervical arteries      X-ray hands, 11/2014:   No erosive disease     X-ray feet, 11/2014:   No erosive change     CTA chest, abdomen, pelvis with contrast, 3/2023:  1. Thickening of the suprarenal and proximal infrarenal aortic wall and periaortic thickening/straning in the distal abdominal aorta consistent with aortitis.  2. Focal irregular enlargement of the ascending aorta and descending thoracic aorta. Maximum ascending aorta diameters are 43 x 42 mm with area/height ratio of 9.3 cm2/m. Maximum descending thoracic aorta diameters are 36 x 32 mm.  3. Moderate stenosis in the proximal inferior mesenteric artery.  4. Patent iliac and common femoral arteries.  5. Compared to prior study on 12/16/22: Maximum ascending aorta diameters have increased. Distal periaortic wall thickening and soft tissue stranding appears more prominent.     CT chest, abdomen, pelvis with contrast, 3/2022:   1. Prominent aorta thickening identified, noted of the distal ascending aorta throughout the aortic arch, descending thoracic aorta, and ending at the level of the renal arteries c/w aortitis.   A. The maximal thickening is at the level or just below the level of the diaphragm at 8-9 mm.   B. There is aneurysmal  dilatation in the mid descending thoracic aorta up to 3.7 cm.   C. There is irregular aortitis-type change extending into the base of the brachiocephalic and left subclavian vessels.   D. No other branch vessels were definitely involved with aortitis changes. In-flow vessels appear normal in thickness and renal arteries are widely patent.     CT abdomen pelvis with contrast, 1/2017:  No acute changes in the abdomen or pelvis to account for patient's symptoms. No bowel obstruction or diverticulitis. Appendix is normal.       Subjective    Visit date March 6, 2025    Michelle is a 34 year old female who presents today for follow up.    Since the last visit,    - Workup 1/7/2025 showed:  Hemoglobin elevated  WBC  elevated  Platelets  within normal limits   AST  within normal limits   Creatinine  within normal limits     She was evaluated by ENT, there was nasal crusting as well as sinus disease and recommended mupirocin ointment    She was evaluated by pulmonary and inhaler was recommended as well as allergy consultation    She was evaluated by neurology and plan for EMG    Today, Michelle mentioned the following:    She is presenting to video visit with her mother jorgito     She is using saline spray and is helping her nose and ear fullness, however she remains with ear fullness    She has not received the inhalers yet so she remains with shortness of breath on exertion.     She is scheduled with Opthalmology     She denies fevers, unintentional weight loss however she has been having night sweats. She feels night sweats is worse since being off methotrexate.     She has been off both methotrexate and humira since January 2025    She sometimes gets cramps sometimes in her legs with activities over the past 2 months     ROS    Current Medications   Methotrexate 25 mg weekly (Sundays), currently off for 2 months   Folic acid 1 mg daily   Humira injection every 2 weeks, currently off for 2 months     Past Medical history  "  Past Medical History:   Diagnosis Date     Uncomplicated asthma      Unspecified otitis media     Otitis Media as a child       Objective  Ht 1.549 m (5' 1\")   Wt 65.8 kg (145 lb)   BMI 27.40 kg/m        PHYSICAL EXAMINATION  Physical Exam  Alert  Able to speak full sentence  No visible skin rash      Assessment & Plan    # Large vessel vasculitis  # Aortitis  # Radiographic thickening aorta [ascending, descending thoracic, abdominal]  # Radiographic mild ectasia left subclavian  # Radiographic thickening proximal inferior mesenteric  # Radiographic thickening of bilateral iliac arteries    # Exertional shortness of breath, history of asthma    # Recurrent ear infections  # Recurrent eye redness with photophobia  # Lower extremity tingling and numbness  # Negative ANCA serology, normal IgG4 level    # Family history of autoimmunity  # Chronic immunosuppression  # Screening for chronic infections  # High risk medication requiring frequent lab tests for toxicity monitoring  # Longitudinal care    # Large vessel vasculitis  # Aortitis  # Radiographic thickening aorta [ascending, descending thoracic, abdominal]  # Radiographic mild ectasia left subclavian  # Radiographic thickening proximal inferior mesenteric  # Radiographic thickening of bilateral iliac arteries     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      ANCA serologies was negative as well as IgG4 in the serum.    Unfortunately there has been intervals of time where she was not taking methotrexate or Humira and has been intermittently on prednisone, fortunately her symptoms were stable in November and early December after resuming methotrexate and Humira.    However she has been experiencing recurrent ear " infections and more recently she has been having night sweats and identified to have mitral and aortic valve insufficiency. She has been holding methotrexate and humira for 2 months.     There is a concern for possible endocarditis given valve insufficiency along with persistent ear infection and night sweats therefore evaluation with infectious disease and cardiology is prudent. If there is no evidence of infection then the valve insufficiency can be attributed to Takayasu arteritis and resuming immunosuppression would be the next step.    I recommended checking updated labs as well as blood cultures to start the evaluation for endocarditis, if her blood cultures are positive then she will need to be evaluated in the emergency room.    I discussed warning signs including fevers, dizziness, chills for which an emergent evaluation in the emergency room would be warranted.     Plan:  - Hold methotrexate 25 mg weekly and Humira till infection is ruled out   - Continue folic acid 1 mg daily  - Infectious disease consultation  - Cardiology consultation    # Exertional shortness of breath, history of asthma  ANCA Negative.  IgE levels elevated, likely from concomitant asthma, less likely from IgG4   No reported parenchymal lung disease on CT imaging  Evaluated by pulmonary, referred to allergy    # Recurrent ear infections  Evaluated by ENT, there might be plan for sinus biopsy     # Recurrent eye redness with photophobia  Ophthalmology referral to rule out uveitis     # Lower extremity tingling and numbness  Neurology consultation completed, recommended EMG     # Chronic immunosuppression  # Screening for chronic infections  2024  Hepatitis B surface antigen: Negative  Hepatitis B core: Negative  Hepatitis B surface antibody: Negative   Hepatitis C antibody: Negative   QuantiFERON gold: Negative     # High risk medication requiring frequent lab tests for toxicity monitoring  History of methotrexate and Humira use  Labs  reviewed on 1/7/2025, CBC,AST, Creatinine no signs of concerning toxicity   Standing orders in place        Plan:   MTM pharmacy follow-up  Standing orders for monitoring        # Longitudinal care  The longitudinal plan of care for the diagnosis(es)/condition(s) as documented were addressed during this visit. Due to the added complexity in care, I will continue to support Michelle in the subsequent management and with ongoing continuity of care.     Review of the result(s) of each unique test - as HPI   Ordering of each unique test  Prescription drug management    44 minutes spent by me on the date of the encounter doing chart review, history and exam, documentation and further activities per the note      Return in about 2 months (around 5/6/2025) for Follow up.    René Stock MD  [facility] RHEUMATOLOGY        Again, thank you for allowing me to participate in the care of your patient.      Sincerely,    René Stock MD

## 2025-03-06 NOTE — PATIENT INSTRUCTIONS
Our plan for today is:    Infectious disease consultation    Cardiology consultation    ENT consultation     Pulmonary follow up    Neurology follow up    - Tests:    Labs today including blood cultures and inflammatory markers     - Medications:    - Continue to hold Methotrexate and Humira till infection is ruled out.

## 2025-03-06 NOTE — PROGRESS NOTES
Virtual Visit Details    Type of service:  Video Visit     Joined the call at 3/6/2025, 8:32:37 am.  Left the call at 3/6/2025, 9:12:22 am.  You were on the call for 39 minutes 45 seconds    Originating Location (pt. Location): Home    Distant Location (provider location):  On-site  Platform used for Video Visit: [facility]      RHEUMATOLOGY OUTPATIENT CLINIC NOTE     Referring Provider:   Bimal Pearl PA-C  [address] [facility] [address]  [place] [address]     Lab review      RF:  Positive, 20 (normal less than 12) 2022  CCP Ab:  Negative       TITA: Positive, 1: 160, homogenous  dsDNA: negative      Complement C3: normal  Complement C4: normal     ANCA by IFA: Negative   MPO: Negative  SD-3 Abs: Negative    Serum IgG4: Normal    Imaging review     Echo heart 2/2025:  Left ventricular function is decreased. The ejection fraction is 50-55% (borderline).  Global right ventricular function is normal.  Moderate mitral insufficiency is present.  Moderate aortic insufficiency is present.  Aortic root and ascending dilatation is present. Sinuses of Valsalva 3.8 cm (index 2.2). Ascending aorta 4.6 cm (indexed at 2.8cm/m2).  IVC diameter <2.1 cm collapsing >50% with sniff suggests a normal RA pressure of 3 mmHg.  No pericardial effusion is present.     CTA chest, abdomen, pelvis with contrast, 10/2024:  1. Non specific bilateral common iliac artery wall thickening, possible sequelae of arteritis. No adjacent fat stranding to suggest active inflammation. No stenosis, beading, or dissection. 2. Aortitis not suggested.  3. Enlarged aortic measurements:  A. Sinuses of Valsalva: 42 mm  B. Ascending thoracic aorta: 49 mm  C. Below superior mesenteric artery origin: 26 mm  4. Sub 6 mm pulmonary nodules. Per Fleischner Society recommendations,if the patient is at low risk for lung cancer, no follow up is needed. If the patient is at high risk for lung cancer, optional chest CT in 12 months could be performed for  reevaluation.  5. Ascending thoracic aortic aorta larger. 49 mm, previously 43 mm in 2022. Infrarenal aortic wall thickening measured 2.9 mm in thickness in 2022, less than 1 mm on 10/14/2024. Bilateral common iliac artery wall thickening is new from 2022.      CTA head and neck with contrast, 11/2024:  1. Head CTA demonstrates no aneurysm or stenosis of the major intracranial arteries.   2. Neck CTA demonstrates no stenosis of the major cervical arteries      X-ray hands, 11/2014:   No erosive disease     X-ray feet, 11/2014:   No erosive change     CTA chest, abdomen, pelvis with contrast, 3/2023:  1. Thickening of the suprarenal and proximal infrarenal aortic wall and periaortic thickening/straning in the distal abdominal aorta consistent with aortitis.  2. Focal irregular enlargement of the ascending aorta and descending thoracic aorta. Maximum ascending aorta diameters are 43 x 42 mm with area/height ratio of 9.3 cm2/m. Maximum descending thoracic aorta diameters are 36 x 32 mm.  3. Moderate stenosis in the proximal inferior mesenteric artery.  4. Patent iliac and common femoral arteries.  5. Compared to prior study on 12/16/22: Maximum ascending aorta diameters have increased. Distal periaortic wall thickening and soft tissue stranding appears more prominent.     CT chest, abdomen, pelvis with contrast, 3/2022:   1. Prominent aorta thickening identified, noted of the distal ascending aorta throughout the aortic arch, descending thoracic aorta, and ending at the level of the renal arteries c/w aortitis.   A. The maximal thickening is at the level or just below the level of the diaphragm at 8-9 mm.   B. There is aneurysmal dilatation in the mid descending thoracic aorta up to 3.7 cm.   C. There is irregular aortitis-type change extending into the base of the brachiocephalic and left subclavian vessels.   D. No other branch vessels were definitely involved with aortitis changes. In-flow vessels appear normal in  "thickness and renal arteries are widely patent.     CT abdomen pelvis with contrast, 1/2017:  No acute changes in the abdomen or pelvis to account for patient's symptoms. No bowel obstruction or diverticulitis. Appendix is normal.       Subjective     Visit date March 6, 2025    Michelle is a 34 year old female who presents today for follow up.    Since the last visit,    - Workup 1/7/2025 showed:  Hemoglobin elevated  WBC  elevated  Platelets  within normal limits   AST  within normal limits   Creatinine  within normal limits     She was evaluated by ENT, there was nasal crusting as well as sinus disease and recommended mupirocin ointment    She was evaluated by pulmonary and inhaler was recommended as well as allergy consultation    She was evaluated by neurology and plan for EMG    Today, Michelle mentioned the following:    She is presenting to video visit with her mother jorgito     She is using saline spray and is helping her nose and ear fullness, however she remains with ear fullness    She has not received the inhalers yet so she remains with shortness of breath on exertion.     She is scheduled with Opthalmology     She denies fevers, unintentional weight loss however she has been having night sweats. She feels night sweats is worse since being off methotrexate.     She has been off both methotrexate and humira since January 2025    She sometimes gets cramps sometimes in her legs with activities over the past 2 months     ROS    Current Medications   Methotrexate 25 mg weekly (Sundays), currently off for 2 months   Folic acid 1 mg daily   Humira injection every 2 weeks, currently off for 2 months     Past Medical history   Past Medical History:   Diagnosis Date    Uncomplicated asthma     Unspecified otitis media     Otitis Media as a child       Objective   Ht 1.549 m (5' 1\")   Wt 65.8 kg (145 lb)   BMI 27.40 kg/m        PHYSICAL EXAMINATION  Physical Exam  Alert  Able to speak full sentence  No visible " skin rash      Assessment & Plan     # Large vessel vasculitis  # Aortitis  # Radiographic thickening aorta [ascending, descending thoracic, abdominal]  # Radiographic mild ectasia left subclavian  # Radiographic thickening proximal inferior mesenteric  # Radiographic thickening of bilateral iliac arteries    # Exertional shortness of breath, history of asthma    # Recurrent ear infections  # Recurrent eye redness with photophobia  # Lower extremity tingling and numbness  # Negative ANCA serology, normal IgG4 level    # Family history of autoimmunity  # Chronic immunosuppression  # Screening for chronic infections  # High risk medication requiring frequent lab tests for toxicity monitoring  # Longitudinal care    # Large vessel vasculitis  # Aortitis  # Radiographic thickening aorta [ascending, descending thoracic, abdominal]  # Radiographic mild ectasia left subclavian  # Radiographic thickening proximal inferior mesenteric  # Radiographic thickening of bilateral iliac arteries     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      ANCA serologies was negative as well as IgG4 in the serum.    Unfortunately there has been intervals of time where she was not taking methotrexate or Humira and has been intermittently on prednisone, fortunately her symptoms were stable in November and early December after resuming methotrexate and Humira.    However she has been experiencing recurrent ear infections and more recently she has been having night sweats and identified to have mitral and aortic valve insufficiency. She has been holding methotrexate and humira for 2 months.     There is a concern for possible endocarditis given valve insufficiency along with persistent ear infection and  night sweats therefore evaluation with infectious disease and cardiology is prudent. If there is no evidence of infection then the valve insufficiency can be attributed to Takayasu arteritis and resuming immunosuppression would be the next step.    I recommended checking updated labs as well as blood cultures to start the evaluation for endocarditis, if her blood cultures are positive then she will need to be evaluated in the emergency room.    I discussed warning signs including fevers, dizziness, chills for which an emergent evaluation in the emergency room would be warranted.     Plan:  - Hold methotrexate 25 mg weekly and Humira till infection is ruled out   - Continue folic acid 1 mg daily  - Infectious disease consultation  - Cardiology consultation    # Exertional shortness of breath, history of asthma  ANCA Negative.  IgE levels elevated, likely from concomitant asthma, less likely from IgG4   No reported parenchymal lung disease on CT imaging  Evaluated by pulmonary, referred to allergy    # Recurrent ear infections  Evaluated by ENT, there might be plan for sinus biopsy     # Recurrent eye redness with photophobia  Ophthalmology referral to rule out uveitis     # Lower extremity tingling and numbness  Neurology consultation completed, recommended EMG     # Chronic immunosuppression  # Screening for chronic infections  2024  Hepatitis B surface antigen: Negative  Hepatitis B core: Negative  Hepatitis B surface antibody: Negative   Hepatitis C antibody: Negative   QuantiFERON gold: Negative     # High risk medication requiring frequent lab tests for toxicity monitoring  History of methotrexate and Humira use  Labs reviewed on 1/7/2025, CBC,AST, Creatinine no signs of concerning toxicity   Standing orders in place        Plan:   MT pharmacy follow-up  Standing orders for monitoring        # Longitudinal care  The longitudinal plan of care for the diagnosis(es)/condition(s) as documented were addressed during  this visit. Due to the added complexity in care, I will continue to support Michelle in the subsequent management and with ongoing continuity of care.     Review of the result(s) of each unique test - as HPI   Ordering of each unique test  Prescription drug management    44 minutes spent by me on the date of the encounter doing chart review, history and exam, documentation and further activities per the note      Return in about 2 months (around 5/6/2025) for Follow up.    René Stock MD  [facility] RHEUMATOLOGY

## 2025-03-10 ENCOUNTER — TELEPHONE (OUTPATIENT)
Dept: RHEUMATOLOGY | Facility: CLINIC | Age: 35
End: 2025-03-10
Payer: COMMERCIAL

## 2025-03-10 NOTE — TELEPHONE ENCOUNTER
Call to patient to discuss lab results. Reviewed Dr. Stock's lab note with patient. Patient became tearful and upset as she said she thought she would be able to start back on medication. Writer explained that ID needed to clear patient prior to being able to start medication. Patient expressed that she didn't understand why ID would be able to clear her because she stated she was told she needs to see ENT for recurrent ear infections before starting on medication but that appointment isn't until April. Patient requests more information from Dr. Stock. She states she is feeling overwhelmed by all of the doctor's appointments and having a hard time not being on her medication.     Antonia Angeles RN

## 2025-03-11 ENCOUNTER — VIRTUAL VISIT (OUTPATIENT)
Dept: PHARMACY | Facility: CLINIC | Age: 35
End: 2025-03-11
Attending: STUDENT IN AN ORGANIZED HEALTH CARE EDUCATION/TRAINING PROGRAM
Payer: COMMERCIAL

## 2025-03-11 DIAGNOSIS — J40 BRONCHITIS: ICD-10-CM

## 2025-03-11 DIAGNOSIS — M31.8 SYSTEMIC VASCULITIS (H): Primary | ICD-10-CM

## 2025-03-11 DIAGNOSIS — Z78.9 TAKES DIETARY SUPPLEMENTS: ICD-10-CM

## 2025-03-11 DIAGNOSIS — J32.0 CHRONIC MAXILLARY SINUSITIS: ICD-10-CM

## 2025-03-11 DIAGNOSIS — I77.6 AORTITIS: ICD-10-CM

## 2025-03-11 DIAGNOSIS — J45.31 MILD PERSISTENT ASTHMA WITH ACUTE EXACERBATION: ICD-10-CM

## 2025-03-11 LAB
BACTERIA BLD CULT: NO GROWTH
BACTERIA BLD CULT: NO GROWTH

## 2025-03-11 RX ORDER — MULTIVITAMIN
1 TABLET ORAL DAILY
Qty: 30 TABLET | Refills: 5 | Status: SHIPPED | OUTPATIENT
Start: 2025-03-11

## 2025-03-11 NOTE — TELEPHONE ENCOUNTER
I called Michelle and discussed lab results, She will await input from infectious disease and if cleared then restart methotrexate and humira.     René Stock MD

## 2025-03-15 ENCOUNTER — MYC MEDICAL ADVICE (OUTPATIENT)
Dept: PULMONOLOGY | Facility: CLINIC | Age: 35
End: 2025-03-15
Payer: COMMERCIAL

## 2025-03-15 ENCOUNTER — MYC REFILL (OUTPATIENT)
Dept: PULMONOLOGY | Facility: CLINIC | Age: 35
End: 2025-03-15
Payer: COMMERCIAL

## 2025-03-15 DIAGNOSIS — J45.909 UNCOMPLICATED ASTHMA, UNSPECIFIED ASTHMA SEVERITY, UNSPECIFIED WHETHER PERSISTENT: Primary | ICD-10-CM

## 2025-03-15 DIAGNOSIS — J45.909 UNCOMPLICATED ASTHMA, UNSPECIFIED ASTHMA SEVERITY, UNSPECIFIED WHETHER PERSISTENT: ICD-10-CM

## 2025-03-15 NOTE — PROGRESS NOTES
Medication Therapy Management (MTM) Encounter    ASSESSMENT:                            Medication Adherence/Access: Encouraged patient to set alarms to help remember her own medications and  new medications from [facility] when she can.    Systemic Vasculitis/Aoritis: Would benefit from continuing to hold Humira and methotrexate until cleared to restart by ID provider. Discussed since she has been off medications for several weeks it will likely take at least 4 weeks to see improvement in symptoms once medications are restarted.    Bronchitis/Chronic Sinusitis/Asthma: Would benefit from picking up and starting Breo inhaler to help with SOB symptoms. Reviewed if she has any difficulty getting inhaler filled she should contact the pulmonology clinic.    Supplements: Safe for patient to start daily multivitamin. Will send in a prescription to [facility] so insurance will cover. Discussed pharmacy can use any brand covered by insurance and she should follow  the directions on the bottle.    PLAN:                            1. Start a daily adult multivitamin. I sent in a new prescription to [facility] and requested they use the brand covered by your insurance.    2.  your Breo inhaler at [facility]. If they cannot fill it or you have issues with insurance coverage please contact the pulmonology office.    3. Complete your ID visit on 3/21/25 to see if you can restart methotrexate and Humira.    Follow-up: Return in 4 weeks (on 4/8/2025) for MTM Pharmacist Visit.    SUBJECTIVE/OBJECTIVE:                          Michelle Joshua is a 34 year old female seen for a follow-up visit.       Reason for visit: Vasculitis follow up, questions about supplements    Allergies/ADRs: Reviewed in chart  Past Medical History: Reviewed in chart  Tobacco: She reports that she has been smoking cigarettes. She has never used smokeless tobacco.Nicotine/Tobacco Cessation Plan  Not discussed today  Alcohol: 1-3 beverages /  week    Medication Adherence/Access: Has several life stressors happening currently including her mother being hospitalized. Trying to continue taking her medications correctly, picking up her medications from the pharmacy, and managing her other family members health needs has been challenging.    Systemic Vasculitis/Aoritis:   Humira 40 mg every 14 days (holding currently)  Methotrexate 25 mg weekly (holding currently)  Folic acid 1 mg daily  Atorvastatin 20 mg daily  Losartan 50 mg daily     Reports she was diagnosed with Takayasu arteritis in 2022 and previously had taken prednisone, Humira, and methotrexate. Restarted Humira and methotrexate however has been holding since 12/12/24 due to bronchitis. Notes she was tolerating medications well prior to holding them. Has been told if ID clears her on 3/21/25 she can restart Humira and methotrexate. Has not been feeling good off her medications - notes night sweats and fatigue mainly.    Last lab monitoring completed: 3/6/25    Bronchitis/Chronic Sinusitis/Asthma:  Breo 200/25 mcg 1 puff daily (not started yet)  Montelukast 10 mg nightly  Fluticasone 50 mcg nasal spray 2 sprays in each nostril daily  Albuterol 2.5 mg nebs four times daily as needed   Saline nasal spray twice daily      Reports she continues to have SOB on exertion - has not been able to  Breo inhaler yet due to having to help mother who is hospitalized currently. Has been using other medications. Nasal sprays help with nasal congestion but not ear fullness. No side effects noted.    Supplements:  No current medications    Would like to start a daily vitamin - ideally a prenatal but does need it covered by insurance due to cost of OTC medications.     Today's Vitals: There were no vitals taken for this visit.  ----------------    I spent 18 minutes with this patient today. All changes were made via collaborative practice agreement with René Stock MD.     A summary of these  recommendations was sent via [employer].    Reina Rodriguez, PharmD  Medication Therapy Management Pharmacist  [facility]  Phone: [phone]    Telemedicine Visit Details  The patient's medications can be safely assessed via a telemedicine encounter.  Type of service:  Telephone visit  Originating Location (pt. Location): Home    Distant Location (provider location):  On-site  Start Time: 11:30 AM  End Time: 11:48 AM     Medication Therapy Recommendations  Takes dietary supplements   1 Rationale: Untreated condition - Needs additional medication therapy - Indication   Recommendation: Start Medication - MULTIVITAMIN ADULT (MINERALS) PO - Daily   Status: Accepted per CPA   Identified Date: 3/11/2025 Completed Date: 3/11/2025

## 2025-03-16 NOTE — PATIENT INSTRUCTIONS
"Recommendations from today's MTM visit:                                                       1. Start a daily adult multivitamin. I sent in a new prescription to [facility] and requested they use the brand covered by your insurance.    2.  your Breo inhaler at [facility]. If they cannot fill it or you have issues with insurance coverage please contact the pulmonology office.    3. Complete your ID visit on 3/21/25 to see if you can restart methotrexate and Humira.    Follow-up: Return in 4 weeks (on 4/8/2025) for MTM Pharmacist Visit.    It was great speaking with you today.  I value your experience and would be very thankful for your time in providing feedback in our clinic survey. In the next few days, you may receive an email or text message from [employer] with a link to a survey related to your  clinical pharmacist.\"     To schedule another MTM appointment, please call the clinic directly or you may call the MTM scheduling line at [phone].    My Clinical Pharmacist's contact information:                                                      Please feel free to contact me with any questions or concerns you have.      Reina Rodriguez, PharmD  Medication Therapy Management Pharmacist  [facility]  Phone: [phone]     "

## 2025-03-17 ENCOUNTER — TELEPHONE (OUTPATIENT)
Dept: PULMONOLOGY | Facility: CLINIC | Age: 35
End: 2025-03-17
Payer: COMMERCIAL

## 2025-03-17 RX ORDER — FLUTICASONE FUROATE AND VILANTEROL 200; 25 UG/1; UG/1
1 POWDER RESPIRATORY (INHALATION) DAILY
Qty: 60 EACH | Refills: 11 | Status: SHIPPED | OUTPATIENT
Start: 2025-03-17 | End: 2025-03-20

## 2025-03-17 NOTE — TELEPHONE ENCOUNTER
Prior Authorization Retail Medication Request    Medication/Dose: Breo ellipta 300-25mcg    Diagnosis and ICD code (if different than what is on RX):  J45.909  New/renewal/insurance change PA/secondary ins. PA:  Previously Tried and Failed:  flovent  Rationale:  needs step up to ICS/LABA    Insurance   Primary: [facility]  Insurance ID:  [id number]     Secondary (if applicable):  Insurance ID:      Pharmacy Information (if different than what is on RX)  Name:  [facility]  Phone:  [phone]  Fax:[phone]    Clinic Information  Preferred routing pool for dept communication: [facility] RN POOL    COVERMYMEDS KEY:  [id number]

## 2025-03-18 NOTE — TELEPHONE ENCOUNTER
Central Prior Authorization Team - Phone: [phone]     PA Initiation    Medication: BREO ELLIPTA 200-25 MCG/ACT IN AEPB  Insurance Company: [employer] - Phone [phone] Fax [phone]  Pharmacy Filling the Rx: [id number] [place], [place] - [address] AT [facility] & [address] [place] [address]  Filling Pharmacy Phone: [phone]  Filling Pharmacy Fax: [phone]  Start Date: 3/18/2025

## 2025-03-20 RX ORDER — BUDESONIDE AND FORMOTEROL FUMARATE DIHYDRATE 160; 4.5 UG/1; UG/1
2 AEROSOL RESPIRATORY (INHALATION) 2 TIMES DAILY
Qty: 10.2 G | Refills: 11 | Status: SHIPPED | OUTPATIENT
Start: 2025-03-20

## 2025-03-20 NOTE — TELEPHONE ENCOUNTER
Central Prior Authorization Team - Phone: [phone]     PRIOR AUTHORIZATION DENIED    Medication: BREO ELLIPTA 200-25 MCG/ACT IN AEPB  Insurance Company: [employer] - Phone [phone] Fax [phone]  Denial Date: 3/19/2025    Denial Reason(s):             Appeal Information:       Patient Notified: NO  Unfortunately, we cannot call the patient with denials because we do not know what next steps the MD will take nor can we give medical advice, please notify the patient of what they are to expect for the continuation of their therapy from the provider.

## 2025-03-21 ENCOUNTER — TELEPHONE (OUTPATIENT)
Dept: RHEUMATOLOGY | Facility: CLINIC | Age: 35
End: 2025-03-21

## 2025-03-21 ENCOUNTER — LAB (OUTPATIENT)
Dept: LAB | Facility: CLINIC | Age: 35
End: 2025-03-21
Payer: COMMERCIAL

## 2025-03-21 DIAGNOSIS — M31.4 TAKAYASU'S ARTERITIS (H): ICD-10-CM

## 2025-03-21 DIAGNOSIS — I35.1 AORTIC VALVE INSUFFICIENCY, ETIOLOGY OF CARDIAC VALVE DISEASE UNSPECIFIED: ICD-10-CM

## 2025-03-21 DIAGNOSIS — I34.0 MITRAL VALVE INSUFFICIENCY, UNSPECIFIED ETIOLOGY: ICD-10-CM

## 2025-03-21 DIAGNOSIS — R20.2 PARESTHESIAS: ICD-10-CM

## 2025-03-21 LAB
FOLATE SERPL-MCNC: 5.9 NG/ML (ref 4.6–34.8)
TSH SERPL DL<=0.005 MIU/L-ACNC: 3.92 UIU/ML (ref 0.3–4.2)
VIT D+METAB SERPL-MCNC: 14 NG/ML (ref 20–50)

## 2025-03-21 PROCEDURE — 84207 ASSAY OF VITAMIN B-6: CPT | Mod: 90 | Performed by: PATHOLOGY

## 2025-03-21 PROCEDURE — 86635 COCCIDIOIDES ANTIBODY: CPT | Mod: 90 | Performed by: PATHOLOGY

## 2025-03-21 PROCEDURE — 86612 BLASTOMYCES ANTIBODY: CPT | Mod: 90 | Performed by: PATHOLOGY

## 2025-03-21 PROCEDURE — 84443 ASSAY THYROID STIM HORMONE: CPT | Performed by: PATHOLOGY

## 2025-03-21 PROCEDURE — 86698 HISTOPLASMA ANTIBODY: CPT | Mod: 90 | Performed by: PATHOLOGY

## 2025-03-21 PROCEDURE — 82306 VITAMIN D 25 HYDROXY: CPT | Performed by: PSYCHIATRY & NEUROLOGY

## 2025-03-21 PROCEDURE — 82746 ASSAY OF FOLIC ACID SERUM: CPT | Performed by: PSYCHIATRY & NEUROLOGY

## 2025-03-21 PROCEDURE — 86638 Q FEVER ANTIBODY: CPT | Mod: 90 | Performed by: PATHOLOGY

## 2025-03-21 PROCEDURE — 86606 ASPERGILLUS ANTIBODY: CPT | Mod: 90 | Performed by: PATHOLOGY

## 2025-03-21 PROCEDURE — 87103 BLOOD FUNGUS CULTURE: CPT | Performed by: INTERNAL MEDICINE

## 2025-03-21 PROCEDURE — 86611 BARTONELLA ANTIBODY: CPT | Mod: 90 | Performed by: PATHOLOGY

## 2025-03-21 PROCEDURE — 86622 BRUCELLA ANTIBODY: CPT | Mod: 90 | Performed by: PATHOLOGY

## 2025-03-21 PROCEDURE — 99000 SPECIMEN HANDLING OFFICE-LAB: CPT | Performed by: PATHOLOGY

## 2025-03-21 PROCEDURE — 36415 COLL VENOUS BLD VENIPUNCTURE: CPT | Performed by: PATHOLOGY

## 2025-03-21 NOTE — TELEPHONE ENCOUNTER
JOSE and sent Vendavo message to patient to relay information from provider.     Antonia Angeles RN

## 2025-03-21 NOTE — TELEPHONE ENCOUNTER
----- Message from René Stock sent at 3/21/2025  2:55 PM CDT -----  Jayson Talbot,    Please let Michelle know that it would be okay to resume her medications, both methotrexate and humira.     If the extended infectious disease workup is negative and she is not feeling well in 2 weeks from today then she can start low dose prednisone 5 mg daily. I will send her a prescription for this as well.    Thank you

## 2025-03-23 LAB
B QUINTANA IGG TITR SER: NORMAL {TITER}
B QUINTANA IGM TITR SER: NORMAL {TITER}

## 2025-03-24 ENCOUNTER — MYC REFILL (OUTPATIENT)
Dept: RHEUMATOLOGY | Facility: CLINIC | Age: 35
End: 2025-03-24
Payer: COMMERCIAL

## 2025-03-24 ENCOUNTER — MYC REFILL (OUTPATIENT)
Dept: OTHER | Facility: CLINIC | Age: 35
End: 2025-03-24
Payer: COMMERCIAL

## 2025-03-24 ENCOUNTER — MYC REFILL (OUTPATIENT)
Dept: FAMILY MEDICINE | Facility: CLINIC | Age: 35
End: 2025-03-24
Payer: COMMERCIAL

## 2025-03-24 DIAGNOSIS — M31.4 TAKAYASU'S ARTERITIS (H): ICD-10-CM

## 2025-03-24 DIAGNOSIS — M31.8 SYSTEMIC VASCULITIS (H): ICD-10-CM

## 2025-03-24 DIAGNOSIS — I77.6 AORTITIS: ICD-10-CM

## 2025-03-24 DIAGNOSIS — Z91.030 H/O BEE STING ALLERGY: ICD-10-CM

## 2025-03-24 LAB
ASPERGILLUS AB TITR SER CF: NORMAL {TITER}
B DERMAT AB SER-ACNC: 0.9 IV
BRUCELLA AB TITR SER AGGL: NORMAL {TITER}
C BURNET PH1 IGG SER QL IF: NEGATIVE
C BURNET PH2 IGG SER QL IF: NEGATIVE
COCCIDIOIDES AB TITR SER CF: NORMAL {TITER}
H CAPSUL MYC AB TITR SER CF: NORMAL {TITER}
H CAPSUL YST AB TITR SER CF: NORMAL {TITER}

## 2025-03-24 RX ORDER — LOSARTAN POTASSIUM 100 MG/1
100 TABLET ORAL DAILY
Qty: 90 TABLET | Refills: 1 | Status: SHIPPED | OUTPATIENT
Start: 2025-03-24

## 2025-03-24 RX ORDER — EPINEPHRINE 0.3 MG/.3ML
0.3 INJECTION SUBCUTANEOUS PRN
Qty: 2 EACH | Refills: 11 | Status: SHIPPED | OUTPATIENT
Start: 2025-03-24

## 2025-03-24 RX ORDER — PREDNISONE 5 MG/1
5 TABLET ORAL DAILY
Qty: 60 TABLET | Refills: 0 | Status: CANCELLED | OUTPATIENT
Start: 2025-03-24

## 2025-03-24 NOTE — TELEPHONE ENCOUNTER
Call to patient to relay recommendations from . Patient verbalized understanding and has no further questions or concerns at this time.     Antonia Angeles RN

## 2025-03-24 NOTE — TELEPHONE ENCOUNTER
Unable to fill per [facility] protocol.  Medication and pharmacy loaded.    Kristen BETTS, RN    [facility]  Office: [phone]  Fax: [phone]

## 2025-03-25 DIAGNOSIS — R07.89 OTHER CHEST PAIN: ICD-10-CM

## 2025-03-25 DIAGNOSIS — R20.2 PARESTHESIAS: Primary | ICD-10-CM

## 2025-03-25 DIAGNOSIS — M31.4 TAKAYASU'S ARTERITIS (H): Primary | ICD-10-CM

## 2025-03-25 DIAGNOSIS — E55.9 HYPOVITAMINOSIS D: ICD-10-CM

## 2025-03-25 RX ORDER — ERGOCALCIFEROL 1.25 MG/1
50000 CAPSULE, LIQUID FILLED ORAL WEEKLY
Qty: 8 CAPSULE | Refills: 0 | Status: SHIPPED | OUTPATIENT
Start: 2025-03-25 | End: 2025-05-14

## 2025-03-26 LAB — PYRIDOXAL PHOS SERPL-SCNC: 12.2 NMOL/L

## 2025-03-27 LAB — BACTERIA BLD CULT: NORMAL

## 2025-04-02 ENCOUNTER — NURSE TRIAGE (OUTPATIENT)
Dept: FAMILY MEDICINE | Facility: CLINIC | Age: 35
End: 2025-04-02

## 2025-04-02 ENCOUNTER — APPOINTMENT (OUTPATIENT)
Dept: PHARMACY | Facility: CLINIC | Age: 35
End: 2025-04-02
Attending: STUDENT IN AN ORGANIZED HEALTH CARE EDUCATION/TRAINING PROGRAM
Payer: COMMERCIAL

## 2025-04-02 ENCOUNTER — VIRTUAL VISIT (OUTPATIENT)
Dept: INTERNAL MEDICINE | Facility: CLINIC | Age: 35
End: 2025-04-02
Payer: COMMERCIAL

## 2025-04-02 DIAGNOSIS — Z86.69 HISTORY OF RECURRENT EAR INFECTION: ICD-10-CM

## 2025-04-02 DIAGNOSIS — M31.8 SYSTEMIC VASCULITIS (H): Primary | ICD-10-CM

## 2025-04-02 DIAGNOSIS — I34.0 MITRAL INSUFFICIENCY: ICD-10-CM

## 2025-04-02 DIAGNOSIS — H92.01 DISCOMFORT OF RIGHT EAR: Primary | ICD-10-CM

## 2025-04-02 DIAGNOSIS — M31.4 TAKAYASU'S ARTERITIS (H): Primary | ICD-10-CM

## 2025-04-02 DIAGNOSIS — I77.6 AORTITIS: ICD-10-CM

## 2025-04-02 NOTE — TELEPHONE ENCOUNTER
"S-(situation): Patient calling wondering if her provider can prescribe her something for an ear infection.     B-(background): Patient has been struggling with have ear infections her whole life and has appointment with ENT on 4/9.     A-(assessment): Patient reports right ear pain for the last two weeks. Pain is now at a 10/10 and yesterday her right ear started having tan watery discharge. She is also having right side of the face pain and a headache. No fevers. Patient says she's allergic to Tylenol and unable to take ibuprofen due to her other medications she is on.     R-(recommendations): Advised that an appointment would most likely be needed for her symptoms. She said she had a video visit with a provider at another clinic but stated that they weren't able to diagnose her with an ear infection over the video visit. She is wanting antibiotics to get this treated as she has had an ear infection before and knows what it feels like.   Told her I would send this over to her team and provider and to expect a call back within the hour. Patient understands this plan.     Sara Mejia RN      Reason for Disposition   Ear pain    Additional Information   Negative: Clear or white discharge and swimmer's ear suspected (e.g., recent swimming, ear pain occurs or increases when the earlobe is pulled up and down)   Negative: Bloody ear discharge and after an ear injury   Negative: Earwax is main concern   Negative: Bloody or clear ear discharge and after a head or face injury   Negative: Unexplained bleeding and lasts > 10 minutes or large amount  (Exception: If a few drops of blood, continue with triage.)   Negative: Fever > 103 F (39.4 C)   Negative: Patient sounds very sick or weak to the triager    Answer Assessment - Initial Assessment Questions  1. LOCATION: \"Which ear is involved?\"       Right ear   2. COLOR: \"What is the color of the discharge?\"       Like a greenish/yellow color   3. CONSISTENCY: \"How runny is the " "discharge? Could it be water?\"       =Runny   4. ONSET: \"When did you first notice the discharge?\"      Yesterday   5. PAIN: \"Is there any earache?\" \"How bad is it?\"  (Scale 0-10; none, mild, moderate or severe)     10/10 pain   6. OBJECTS: \"Have you put anything in your ear?\" (e.g., Q-tip, other object)       No   7. OTHER SYMPTOMS: \"Do you have any other symptoms?\" (e.g., headache, fever, dizziness, vomiting, runny nose)      Right side of her face hurts, headache on the right side  8. PREGNANCY: \"Is there any chance you are pregnant?\" \"When was your last menstrual period?\"      No    Protocols used: Ear - [id number]-JB    "

## 2025-04-02 NOTE — TELEPHONE ENCOUNTER
Pt called back.     RN offered first available appt tomorrow, as no further appts available today.   Pt declined, noting she wanted a phonecall today from her PCP JS.   Advised pt that PCP is currently out of the office, and an appointment is necessary for evaluation and assessment prior to medication prescribing.  Pt then requested a phonecall from Dr. Youssef today instead.   Advised pt that Dr. Youssef is not currently in the office.   Offered pt appointment options for tomorrow morning, or discussed option of a local UC or ED if pt would like to be seen today or if pain is severe.   Pt expressed frustration.   Again RN offered pt appointment options at [facility] for tomorrow morning, however pt only wanted to be seen by Dr. Youssef or Emmanuel Pearl, both of whom are out of office tomorrow.     After further discussion of options, pt agreed to be scheduled with Dr. Chapa at 8am tomorrow morning.   Pt ended phonecall.     Kady ACOSTA RN

## 2025-04-02 NOTE — PROGRESS NOTES
"Michelle is a 34 year old who is being evaluated via a billable video visit.    How would you like to obtain your AVS? MyChart  If the video visit is dropped, the invitation should be resent by: Text to cell phone: [phone]  Will anyone else be joining your video visit? No      Assessment & Plan     Discomfort of right ear >6 months  History of recurrent ear infection    Symptoms for 6 months now- ear ache/discomfort with an ear discharge.feels like a popping sensation.symptoms more on right than left.  Appointment with ENT team next week.  Patient is upset during the visit.  Expresses frustration, wants an antibiotic to be sent in.  I recommended an in person visit for assessment of the ear and patient hung up.        BMI  Estimated body mass index is 27.4 kg/m  as calculated from the following:    Height as of 3/21/25: 1.549 m (5' 1\").    Weight as of 3/21/25: 65.8 kg (145 lb).             Subjective   Michelle is a 34 year old, presenting for the following health issues:  Otitis Media        4/2/2025     1:51 PM   Additional Questions   Roomed by hope r   Accompanied by self         4/2/2025     1:51 PM   Patient Reported Additional Medications   Patient reports taking the following new medications oral antibiotic         HPI      Symptoms for 6 months now- ear ache/discomfort with an ear discharge.feels like a popping sensation.symptoms more on right than left.  Appointment with ENT team next week.  Patient is upset during the visit.  Expresses frustration, wants an antibiotic to be sent in.  I recommended an in person visit for assessment of the ear and patient hung up.    Review of Systems  Constitutional, HEENT, cardiovascular, pulmonary, gi and gu systems are negative, except as otherwise noted.      Objective    Vitals - Patient Reported  Pain Score: No Pain (0)        Physical Exam   GENERAL: alert and no distress  EYES: Eyes grossly normal to inspection.  No discharge or erythema, or obvious " scleral/conjunctival abnormalities.  RESP: No audible wheeze, cough, or visible cyanosis.    SKIN: Visible skin clear. No significant rash, abnormal pigmentation or lesions.  NEURO: Cranial nerves grossly intact.  Mentation and speech appropriate for age.  PSYCH: Appropriate affect, tone, and pace of words          Video-Visit Details    Type of service:  Video Visit   Originating Location (pt. Location): Home    Distant Location (provider location):  On-site  Platform used for Video Visit: Evelyn  Signed Electronically by: Kay Plasencia MD

## 2025-04-02 NOTE — TELEPHONE ENCOUNTER
Left message for patient to call [facility] back  When patient calls back please transfer to an RN  Thanks,  Charlotte MIRZA RN

## 2025-04-03 ENCOUNTER — OFFICE VISIT (OUTPATIENT)
Dept: FAMILY MEDICINE | Facility: CLINIC | Age: 35
End: 2025-04-03
Payer: COMMERCIAL

## 2025-04-03 VITALS
OXYGEN SATURATION: 97 % | HEART RATE: 98 BPM | SYSTOLIC BLOOD PRESSURE: 180 MMHG | DIASTOLIC BLOOD PRESSURE: 75 MMHG | TEMPERATURE: 97.3 F | RESPIRATION RATE: 18 BRPM | WEIGHT: 145.1 LBS | BODY MASS INDEX: 27.42 KG/M2

## 2025-04-03 DIAGNOSIS — Z71.6 ENCOUNTER FOR SMOKING CESSATION COUNSELING: ICD-10-CM

## 2025-04-03 DIAGNOSIS — I50.20 HEART FAILURE WITH REDUCED EJECTION FRACTION (H): ICD-10-CM

## 2025-04-03 DIAGNOSIS — J45.51 SEVERE PERSISTENT ASTHMA WITH ACUTE EXACERBATION (H): ICD-10-CM

## 2025-04-03 DIAGNOSIS — H66.004 RECURRENT ACUTE SUPPURATIVE OTITIS MEDIA OF RIGHT EAR WITHOUT SPONTANEOUS RUPTURE OF TYMPANIC MEMBRANE: Primary | ICD-10-CM

## 2025-04-03 DIAGNOSIS — M31.4 TAKAYASU'S ARTERITIS (H): ICD-10-CM

## 2025-04-03 DIAGNOSIS — H10.31 ACUTE BACTERIAL CONJUNCTIVITIS OF RIGHT EYE: ICD-10-CM

## 2025-04-03 DIAGNOSIS — M31.8 SYSTEMIC VASCULITIS (H): ICD-10-CM

## 2025-04-03 LAB — BACTERIA BLD CULT: NORMAL

## 2025-04-03 RX ORDER — DOXYCYCLINE HYCLATE 100 MG
100 TABLET ORAL 2 TIMES DAILY
Qty: 20 TABLET | Refills: 0 | Status: SHIPPED | OUTPATIENT
Start: 2025-04-03

## 2025-04-03 RX ORDER — ERYTHROMYCIN 5 MG/G
OINTMENT OPHTHALMIC
Qty: 3.5 G | Refills: 0 | Status: SHIPPED | OUTPATIENT
Start: 2025-04-03 | End: 2025-04-10

## 2025-04-03 ASSESSMENT — ASTHMA QUESTIONNAIRES
QUESTION_2 LAST FOUR WEEKS HOW OFTEN HAVE YOU HAD SHORTNESS OF BREATH: MORE THAN ONCE A DAY
QUESTION_4 LAST FOUR WEEKS HOW OFTEN HAVE YOU USED YOUR RESCUE INHALER OR NEBULIZER MEDICATION (SUCH AS ALBUTEROL): THREE OR MORE TIMES PER DAY
QUESTION_5 LAST FOUR WEEKS HOW WOULD YOU RATE YOUR ASTHMA CONTROL: POORLY CONTROLLED
QUESTION_1 LAST FOUR WEEKS HOW MUCH OF THE TIME DID YOUR ASTHMA KEEP YOU FROM GETTING AS MUCH DONE AT WORK, SCHOOL OR AT HOME: ALL OF THE TIME
ACT_TOTALSCORE: 6
QUESTION_3 LAST FOUR WEEKS HOW OFTEN DID YOUR ASTHMA SYMPTOMS (WHEEZING, COUGHING, SHORTNESS OF BREATH, CHEST TIGHTNESS OR PAIN) WAKE YOU UP AT NIGHT OR EARLIER THAN USUAL IN THE MORNING: FOUR OR MORE NIGHTS A WEEK

## 2025-04-03 ASSESSMENT — PAIN SCALES - GENERAL: PAINLEVEL_OUTOF10: SEVERE PAIN (10)

## 2025-04-03 NOTE — PROGRESS NOTES
"  Assessment & Plan     1. Recurrent acute suppurative otitis media of right ear without spontaneous rupture of tympanic membrane (Primary)  Plan:- doxycycline hyclate (VIBRA-TABS) 100 MG tablet; Take 1 tablet (100 mg) by mouth 2 times daily.  Dispense: 20 tablet; Refill: 0    2. Acute bacterial conjunctivitis of right eye  Plan:- erythromycin (ROMYCIN) 5 MG/GM ophthalmic ointment; Apply 1 cm ribbon in affected eye(s) four times a day.  Dispense: 3.5 g; Refill: 0    3. Severe persistent asthma with acute exacerbation (H)  Plan: advised prednisone.        4. Systemic vasculitis (H)  5. Takayasu's arteritis (H)  Plan-followed by specialist.     6. Uncontrolled hypertension  Plan: she has not taken losaratn for a few days and willing to resume       7. Encounter for smoking cessation counseling  Plan; We discussed smoking cessation and  is encouraged .   Assistance with complete cessation .    Discussed  nicotine patches.                BMI  Estimated body mass index is 27.42 kg/m  as calculated from the following:    Height as of 3/21/25: 1.549 m (5' 1\").    Weight as of this encounter: 65.8 kg (145 lb 1.6 oz).   {Weight Management Plan needed for ACO:042798}      {FOLLOW UP PLANS (Optional) Includes COVID19 Treatment Plan:960604}    Mechelle Martinez is a 34 year old, presenting for the following health issues:  Otalgia, Conjunctivitis, and Conjunctivitis    History of Present Illness       Reason for visit:  Ear She is missing 7 dose(s) of medications per week.          Concern - Ear pain   Onset: 6 months    Description: Symptoms more prominent in Right ear  Eye and Ear Discharge color- greenish/yellow    Feels like popping sensation   Already has an ENT appt in June 2025   Intensity: severe  Progression of Symptoms:  same  Accompanying Signs & Symptoms: None   Previous history of similar problem: None   Precipitating factors:        Worsened by: None  Alleviating factors:        Improved by: None  Therapies " tried and outcome: None  {additonal problems for provider to add (Optional):673659}    {ROS Picklists (Optional):910326}      Objective    BP (!) 180/75 (BP Location: Left arm, Patient Position: Sitting, Cuff Size: Adult Regular)   Pulse 98   Temp 97.3  F (36.3  C) (Skin)   Resp 18   Wt 65.8 kg (145 lb 1.6 oz)   LMP  (LMP Unknown)   SpO2 97%   Breastfeeding No   BMI 27.42 kg/m    Body mass index is 27.42 kg/m .  Physical Exam   {Exam List (Optional):271006}    {Diagnostic Test Results (Optional):328135}        Signed Electronically by: Shantelle Chapa MD  {Email feedback regarding this note to [email or web address]   :809669}

## 2025-04-03 NOTE — PATIENT INSTRUCTIONS
1. Recurrent acute suppurative otitis media of right ear without spontaneous rupture of tympanic membrane (Primary)    - doxycycline hyclate (VIBRA-TABS) 100 MG tablet; Take 1 tablet (100 mg) by mouth 2 times daily.  Dispense: 20 tablet; Refill: 0    2. Acute bacterial conjunctivitis of right eye    - erythromycin (ROMYCIN) 5 MG/GM ophthalmic ointment; Apply 1 cm ribbon in affected eye(s) four times a day.  Dispense: 3.5 g; Refill: 0    3. Severe persistent asthma with acute exacerbation (H)      4. Systemic vasculitis (H)  5. Takayasu's arteritis (H)  Please  prednisone     6. Heart failure with reduced ejection fraction (H)  Plan: follow cardiologist    7. Encounter for smoking cessation counseling  Keep trying to quite

## 2025-04-10 LAB — BACTERIA BLD CULT: NORMAL

## 2025-04-11 PROBLEM — H92.09 OTALGIA, UNSPECIFIED LATERALITY: Status: ACTIVE | Noted: 2025-04-11

## 2025-04-11 PROBLEM — J34.89 NASAL CRUSTING: Status: ACTIVE | Noted: 2025-04-11

## 2025-04-11 PROBLEM — L90.5 SCAR OF NOSE: Status: ACTIVE | Noted: 2025-04-11

## 2025-04-15 NOTE — PATIENT INSTRUCTIONS
"Recommendations from today's MTM visit:                                                       1. Continue to hold Humira and methotrexate until your ear infection is treated and better. Once this infection clears you can restart these medications.    2. Please call your primary care doctor's office to discuss in person visit options or go to urgent care to have your ear infection treated.    Follow-up: Return in about 2 weeks (around 4/16/2025) for MTM Pharmacist Visit.    It was great speaking with you today.  I value your experience and would be very thankful for your time in providing feedback in our clinic survey. In the next few days, you may receive an email or text message from [facility] [employer] with a link to a survey related to your  clinical pharmacist.\"     To schedule another MTM appointment, please call the clinic directly or you may call the MTM scheduling line at [phone].    My Clinical Pharmacist's contact information:                                                      Please feel free to contact me with any questions or concerns you have.      Reina Rodriguez, PharmD  Medication Therapy Management Pharmacist  [facility]  Phone: [phone]     "

## 2025-04-15 NOTE — PROGRESS NOTES
Medication Therapy Management (MTM) Encounter    ASSESSMENT:                            Medication Adherence/Access: No current issues identified.    Systemic Vasculitis/Aoritis: Reviewed patient should continue to hold Humira and methotrexate despite ID clearance due to new ear infection. Will plan to restart after completing antibiotic therapy for this. Discussed in detail pharmacist's cannot prescribe medications and Dr. Stock would not be the correct doctor to examine her infection. Recommend she call her PCP office back to discuss the options they have for in person visits or go to urgent care to be evaluated. Patient understands and will plan to call PCP office.    PLAN:                            1. Continue to hold Humira and methotrexate until your ear infection is treated and better. Once this infection clears you can restart these medications.    2. Please call your primary care doctor's office to discuss in person visit options or go to urgent care to have your ear infection treated.    Follow-up: Return in about 2 weeks (around 4/16/2025) for MTM Pharmacist Visit.    SUBJECTIVE/OBJECTIVE:                          Michelle Joshua is a 34 year old female seen for a follow-up visit.       Reason for visit: Seeking care options for ear infection, questions about restarting medications    Allergies/ADRs: Reviewed in chart  Past Medical History: Reviewed in chart  Tobacco: She reports that she has been smoking cigarettes. She has never used smokeless tobacco.Nicotine/Tobacco Cessation Plan  Information offered: Patient not interested at this time  Alcohol: 1-3 beverages / week    Medication Adherence/Access: Has several life stressors happening currently. Trying to continue taking her medications correctly, picking up her medications from the pharmacy, and managing her other family members health needs has been challenging.     Systemic Vasculitis/Aoritis:   Humira 40 mg every 14 days (holding  currently)  Methotrexate 25 mg weekly (holding currently)  Folic acid 1 mg daily  Atorvastatin 20 mg daily  Losartan 100 mg daily     Reports she was diagnosed with Takayasu arteritis in 2022 and previously had taken prednisone, Humira, and methotrexate. Restarted Humira and methotrexate however has been holding since 12/12/24 due to bronchitis and now a recurrent ear infection. Notes ear infections are normal for her and she wants a new antibiotic prescription to help treat this. Did try getting this through a virtual appointment but was told they are unable to assess her condition virtually. Was told it was ok to restart methotrexate and Humira per ID however wants to confirm that she should continue to hold due to infection.     Last lab monitoring completed: 3/6/25    Today's Vitals: There were no vitals taken for this visit.  ----------------    I spent 19 minutes with this patient today. All changes were made via collaborative practice agreement with René Stock.     A summary of these recommendations was sent via [employer].    Reina Rodriguez, PharmD  Medication Therapy Management Pharmacist  [facility]  Phone: [phone]    Telemedicine Visit Details  The patient's medications can be safely assessed via a telemedicine encounter.  Type of service:  Telephone visit  Originating Location (pt. Location): Home    Distant Location (provider location):  Off-site  Start Time: 3:00 PM  End Time: 3:19 PM     Medication Therapy Recommendations  No medication therapy recommendations to display

## 2025-04-17 LAB — BACTERIA BLD CULT: NORMAL

## 2025-04-25 DIAGNOSIS — J32.0 CHRONIC MAXILLARY SINUSITIS: ICD-10-CM

## 2025-04-28 RX ORDER — FLUTICASONE PROPIONATE 50 MCG
SPRAY, SUSPENSION (ML) NASAL
Qty: 16 G | Refills: 0 | Status: SHIPPED | OUTPATIENT
Start: 2025-04-28

## 2025-05-12 ENCOUNTER — APPOINTMENT (OUTPATIENT)
Dept: CT IMAGING | Facility: CLINIC | Age: 35
End: 2025-05-12
Attending: EMERGENCY MEDICINE
Payer: COMMERCIAL

## 2025-05-12 ENCOUNTER — HOSPITAL ENCOUNTER (EMERGENCY)
Facility: CLINIC | Age: 35
Discharge: HOME | End: 2025-05-12
Attending: EMERGENCY MEDICINE | Admitting: EMERGENCY MEDICINE
Payer: COMMERCIAL

## 2025-05-12 ENCOUNTER — APPOINTMENT (OUTPATIENT)
Dept: GENERAL RADIOLOGY | Facility: CLINIC | Age: 35
End: 2025-05-12
Attending: EMERGENCY MEDICINE
Payer: COMMERCIAL

## 2025-05-12 VITALS
DIASTOLIC BLOOD PRESSURE: 68 MMHG | OXYGEN SATURATION: 99 % | SYSTOLIC BLOOD PRESSURE: 154 MMHG | HEART RATE: 111 BPM | TEMPERATURE: 97.8 F | RESPIRATION RATE: 16 BRPM

## 2025-05-12 DIAGNOSIS — S09.90XA CLOSED HEAD INJURY, INITIAL ENCOUNTER: ICD-10-CM

## 2025-05-12 DIAGNOSIS — S16.1XXA CERVICAL STRAIN, INITIAL ENCOUNTER: ICD-10-CM

## 2025-05-12 DIAGNOSIS — S39.013A STRAIN OF PELVIS, INITIAL ENCOUNTER: ICD-10-CM

## 2025-05-12 DIAGNOSIS — S40.012A CONTUSION OF LEFT SHOULDER, INITIAL ENCOUNTER: ICD-10-CM

## 2025-05-12 LAB
HCG UR QL: NEGATIVE
INTERNAL QC OK POCT: NORMAL
POCT KIT EXPIRATION DATE: NORMAL
POCT KIT LOT NUMBER: NORMAL

## 2025-05-12 PROCEDURE — 99284 EMERGENCY DEPT VISIT MOD MDM: CPT | Performed by: EMERGENCY MEDICINE

## 2025-05-12 PROCEDURE — 72170 X-RAY EXAM OF PELVIS: CPT

## 2025-05-12 PROCEDURE — 72125 CT NECK SPINE W/O DYE: CPT

## 2025-05-12 PROCEDURE — 99285 EMERGENCY DEPT VISIT HI MDM: CPT | Mod: 25 | Performed by: EMERGENCY MEDICINE

## 2025-05-12 PROCEDURE — 81025 URINE PREGNANCY TEST: CPT | Performed by: EMERGENCY MEDICINE

## 2025-05-12 PROCEDURE — 250N000013 HC RX MED GY IP 250 OP 250 PS 637: Performed by: EMERGENCY MEDICINE

## 2025-05-12 PROCEDURE — 70450 CT HEAD/BRAIN W/O DYE: CPT

## 2025-05-12 PROCEDURE — 73030 X-RAY EXAM OF SHOULDER: CPT | Mod: LT

## 2025-05-12 RX ORDER — OXYCODONE HYDROCHLORIDE 5 MG/1
5 TABLET ORAL ONCE
Refills: 0 | Status: COMPLETED | OUTPATIENT
Start: 2025-05-12 | End: 2025-05-12

## 2025-05-12 RX ORDER — OXYCODONE HYDROCHLORIDE 5 MG/1
5 TABLET ORAL EVERY 8 HOURS PRN
Qty: 10 TABLET | Refills: 0 | Status: SHIPPED | OUTPATIENT
Start: 2025-05-12 | End: 2025-05-15

## 2025-05-12 RX ADMIN — OXYCODONE 5 MG: 5 TABLET ORAL at 11:00

## 2025-05-12 ASSESSMENT — ACTIVITIES OF DAILY LIVING (ADL)
ADLS_ACUITY_SCORE: 45
ADLS_ACUITY_SCORE: 45

## 2025-05-12 ASSESSMENT — COLUMBIA-SUICIDE SEVERITY RATING SCALE - C-SSRS
6. HAVE YOU EVER DONE ANYTHING, STARTED TO DO ANYTHING, OR PREPARED TO DO ANYTHING TO END YOUR LIFE?: NO
1. IN THE PAST MONTH, HAVE YOU WISHED YOU WERE DEAD OR WISHED YOU COULD GO TO SLEEP AND NOT WAKE UP?: NO
2. HAVE YOU ACTUALLY HAD ANY THOUGHTS OF KILLING YOURSELF IN THE PAST MONTH?: NO

## 2025-05-12 NOTE — CARE PLAN
05/12/25 0950   Aggressive/Violent Post Event Doc   When was the event?   --    Where was the event?   --    What was event?  --    Precipitating events, if known?  --

## 2025-05-12 NOTE — ED NOTES
Patient arrived with 13 y/o son as well. He was brought to Peds to be evaluated. Mom is in agreement.
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
## 2025-05-12 NOTE — DISCHARGE INSTRUCTIONS
Take oxycodone as needed for pain.  Apply ice to the painful areas for 20 minutes at a time, 3-4 times per day.  Gentle stretching to areas that are sore.  Activity as tolerated.    Follow-up with your primary care clinic in 1 week if still symptomatic.    Return if fever or other concerns.

## 2025-05-19 ENCOUNTER — PRE VISIT (OUTPATIENT)
Dept: CARDIOLOGY | Facility: CLINIC | Age: 35
End: 2025-05-19
Payer: COMMERCIAL

## 2025-05-20 ENCOUNTER — TELEPHONE (OUTPATIENT)
Dept: NEUROLOGY | Facility: CLINIC | Age: 35
End: 2025-05-20
Payer: COMMERCIAL

## 2025-05-20 DIAGNOSIS — H10.31 ACUTE BACTERIAL CONJUNCTIVITIS OF RIGHT EYE: ICD-10-CM

## 2025-05-20 DIAGNOSIS — J45.31 MILD PERSISTENT ASTHMA WITH ACUTE EXACERBATION: ICD-10-CM

## 2025-05-20 RX ORDER — FLUTICASONE PROPIONATE 220 UG/1
1 AEROSOL, METERED RESPIRATORY (INHALATION) 2 TIMES DAILY
Qty: 12 G | Refills: 0 | Status: SHIPPED | OUTPATIENT
Start: 2025-05-20

## 2025-05-20 RX ORDER — ALBUTEROL SULFATE 90 UG/1
AEROSOL, METERED RESPIRATORY (INHALATION)
Qty: 18 G | Refills: 0 | Status: SHIPPED | OUTPATIENT
Start: 2025-05-20

## 2025-05-20 NOTE — TELEPHONE ENCOUNTER
[facility] Task Note    TASK    Neurology Rx Refill  Medication vitamin D2 (ERGOCALCIFEROL) 17229 units (1250 mcg) capsule    Dose Take 1 capsule (50,000 Units) by mouth once a week for 8 doses.    Last refill ordered (m/d/y) 03/25/2025   Last quantity ordered 8   Last # refills 0   Last clinic visit with ordering provider (m/d/y) 01/14/2025   Next clinic visit with ordering provider (m/d/y) None Scheduled   All pertinent protocol data (lab date/result)    Pertinent information from patient's message        FOLLOW-UP      ADDITIONAL COMMENTS  Rx was filled until 05/14 so I am unable to T it up.     Irene Purdy

## 2025-05-22 DIAGNOSIS — J32.0 CHRONIC MAXILLARY SINUSITIS: ICD-10-CM

## 2025-05-22 RX ORDER — FLUTICASONE PROPIONATE 50 MCG
SPRAY, SUSPENSION (ML) NASAL
Qty: 16 G | Refills: 1 | Status: SHIPPED | OUTPATIENT
Start: 2025-05-22

## 2025-05-24 DIAGNOSIS — J45.31 MILD PERSISTENT ASTHMA WITH ACUTE EXACERBATION: ICD-10-CM

## 2025-05-27 RX ORDER — FLUTICASONE PROPIONATE 220 UG/1
1 AEROSOL, METERED RESPIRATORY (INHALATION) 2 TIMES DAILY
Qty: 12 G | Refills: 0 | OUTPATIENT
Start: 2025-05-27

## 2025-06-09 ENCOUNTER — LAB (OUTPATIENT)
Dept: LAB | Facility: CLINIC | Age: 35
End: 2025-06-09
Payer: COMMERCIAL

## 2025-06-09 DIAGNOSIS — R20.2 PARESTHESIAS: ICD-10-CM

## 2025-06-09 DIAGNOSIS — M31.4 TAKAYASU'S ARTERITIS (H): ICD-10-CM

## 2025-06-09 LAB — VIT B12 SERPL-MCNC: 723 PG/ML (ref 232–1245)

## 2025-06-09 PROCEDURE — 36415 COLL VENOUS BLD VENIPUNCTURE: CPT

## 2025-06-09 PROCEDURE — 82607 VITAMIN B-12: CPT

## 2025-06-10 ENCOUNTER — VIRTUAL VISIT (OUTPATIENT)
Dept: PHARMACY | Facility: CLINIC | Age: 35
End: 2025-06-10
Attending: STUDENT IN AN ORGANIZED HEALTH CARE EDUCATION/TRAINING PROGRAM
Payer: COMMERCIAL

## 2025-06-10 DIAGNOSIS — I77.6 AORTITIS: ICD-10-CM

## 2025-06-10 DIAGNOSIS — M31.8 SYSTEMIC VASCULITIS (H): Primary | ICD-10-CM

## 2025-06-10 RX ORDER — ERYTHROMYCIN 5 MG/G
OINTMENT OPHTHALMIC
Qty: 3.5 G | Refills: 0 | OUTPATIENT
Start: 2025-06-10

## 2025-06-10 NOTE — PATIENT INSTRUCTIONS
"Recommendations from today's MTM visit:                                                       Follow up with ENT on 6/12/25 as scheduled.    Complete ordered labs while in clinic on 6/12/25 - either before or after scheduled appointments.    Follow-up: Return in about 3 months (around 9/10/2025) for MTM Pharmacist Visit.    It was great speaking with you today.  I value your experience and would be very thankful for your time in providing feedback in our clinic survey. In the next few days, you may receive an email or text message from [employer] with a link to a survey related to your  clinical pharmacist.\"     To schedule another MTM appointment, please call the clinic directly or you may call the MTM scheduling line at [phone].    My Clinical Pharmacist's contact information:                                                      Please feel free to contact me with any questions or concerns you have.      Reina Rodriguez, PharmD  Medication Therapy Management Pharmacist  [facility]  Phone: [phone]     "

## 2025-06-10 NOTE — PROGRESS NOTES
Medication Therapy Management (MTM) Encounter    ASSESSMENT:                            Medication Adherence/Access: No issues identified.    Systemic Vasculitis/Aoritis:   Patient to follow-up with ENT on Thursday to assess ear infection as planned. Will plan to go in to get follow up labs before or after her appointment on Thursday.     PLAN:                            Follow up with ENT on 6/12/25 as scheduled.    Complete ordered labs while in clinic on 6/12/25 - either before or after scheduled appointments.    Follow-up: Return in about 3 months (around 9/10/2025) for MTM Pharmacist Visit.    SUBJECTIVE/OBJECTIVE:                          Michelle Joshua is a 34 year old female seen for a follow-up visit.       Reason for visit: Methotrexate/Humira follow-up.    Allergies/ADRs: Reviewed in chart  Past Medical History: Reviewed in chart  Tobacco: She reports that she has been smoking cigarettes. She has never used smokeless tobacco.Nicotine/Tobacco Cessation Plan  Not discussed today  Alcohol: 1-3 beverages / week    Medication Adherence/Access: no issues reported.    Systemic Vasculitis/Aoritis:   Humira 40 mg every 14 days   Methotrexate 25 mg weekly   Folic acid 1 mg daily  Atorvastatin 20 mg daily  Losartan 100 mg daily    Patient stated that they have an ENT and audiology appointment this Thursday to follow up for her recurrent ear infections. Reported that the ear infections still have not improved, and that her headaches are still getting worse. Stated that she has completed her antibiotic therapy. Patient mentioned that they just restarted the Humira and methotrexate last Friday. Reported no side effects with restart of medications. Doesn't state any issues with the process of obtaining the Humira and Methotrexate. Tried to do labs last week but did not have active orders.    Last lab monitoring completed: 3/6/25    Today's Vitals: There were no vitals taken for this visit.  ----------------    I spent 10  minutes with this patient today. All changes were made via collaborative practice agreement with René Stock.     A summary of these recommendations was sent via [employer].    Telemedicine Visit Details  The patient's medications can be safely assessed via a telemedicine encounter.  Type of service:  Telephone visit  Originating Location (pt. Location): Home    Distant Location (provider location):  On-site  Start Time: 11:15 AM  End Time: 11:25 AM     Medication Therapy Recommendations  No medication therapy recommendations to display

## 2025-06-11 ENCOUNTER — TELEPHONE (OUTPATIENT)
Dept: RHEUMATOLOGY | Facility: CLINIC | Age: 35
End: 2025-06-11
Payer: COMMERCIAL

## 2025-06-11 NOTE — TELEPHONE ENCOUNTER
Call to patient at request of [facility] pharmacist to follow up as patient is requesting a letter from Dr. Stock. Patient reports that there was mold found in her apartment and her  has requested a letter stating what types of mold would affect her health with her specific health condition. Writer asked patient what type of mold was found and patient reports it is still being determined. Patient states she needs this letter sooner than her appointment with Dr. Stock and that she requests him to write it because he knows the most about her health conditions.     She reports cough and breathing have been affected because of this mold. She also reports she has been getting migraines. She reports she has had a migraine for around a month or longer. She states she has light sensitivity and blurry vision. She reports the blurry vision has been going on since last year. Patient has not followed up with ophthalmology, neurology or PCP for this. Writer advised patient to be seen by PCP asap to address migraines that she has been experiencing.     Message routed to provider to advise on letter.     Antonia Angeles RN

## 2025-06-12 ENCOUNTER — OFFICE VISIT (OUTPATIENT)
Dept: AUDIOLOGY | Facility: CLINIC | Age: 35
End: 2025-06-12
Payer: COMMERCIAL

## 2025-06-12 ENCOUNTER — OFFICE VISIT (OUTPATIENT)
Dept: OTOLARYNGOLOGY | Facility: CLINIC | Age: 35
End: 2025-06-12
Payer: COMMERCIAL

## 2025-06-12 ENCOUNTER — PRE VISIT (OUTPATIENT)
Dept: OTOLARYNGOLOGY | Facility: CLINIC | Age: 35
End: 2025-06-12

## 2025-06-12 VITALS
TEMPERATURE: 97 F | WEIGHT: 143 LBS | DIASTOLIC BLOOD PRESSURE: 78 MMHG | BODY MASS INDEX: 27 KG/M2 | HEART RATE: 102 BPM | HEIGHT: 61 IN | OXYGEN SATURATION: 99 % | SYSTOLIC BLOOD PRESSURE: 152 MMHG

## 2025-06-12 DIAGNOSIS — H92.03 OTALGIA, BILATERAL: Primary | ICD-10-CM

## 2025-06-12 DIAGNOSIS — H92.03 EAR PAIN, BILATERAL: Primary | ICD-10-CM

## 2025-06-12 DIAGNOSIS — H93.8X3 EAR FULLNESS, BILATERAL: ICD-10-CM

## 2025-06-12 DIAGNOSIS — Z01.10 EXAMINATION OF EARS AND HEARING: ICD-10-CM

## 2025-06-12 PROCEDURE — 1125F AMNT PAIN NOTED PAIN PRSNT: CPT | Performed by: REGISTERED NURSE

## 2025-06-12 PROCEDURE — 3078F DIAST BP <80 MM HG: CPT | Performed by: REGISTERED NURSE

## 2025-06-12 PROCEDURE — 3077F SYST BP >= 140 MM HG: CPT | Performed by: REGISTERED NURSE

## 2025-06-12 PROCEDURE — 99214 OFFICE O/P EST MOD 30 MIN: CPT | Performed by: REGISTERED NURSE

## 2025-06-12 ASSESSMENT — PAIN SCALES - GENERAL: PAINLEVEL_OUTOF10: SEVERE PAIN (10)

## 2025-06-12 NOTE — NURSING NOTE
"Chief Complaint   Patient presents with    Consult     Hearing loss    Blood pressure (!) 152/78, pulse 102, temperature 97  F (36.1  C), height 1.549 m (5' 1\"), weight 64.9 kg (143 lb), SpO2 99%, not currently breastfeeding.    .  Stiven Nina LPN    "

## 2025-06-12 NOTE — PROGRESS NOTES
AUDIOLOGY REPORT    SUMMARY: Audiology visit completed. See audiogram for results.      RECOMMENDATIONS: Follow-up with ENT.    Newton Chatman  Audiologist  [place] License  #[id number]

## 2025-06-12 NOTE — LETTER
6/12/2025       RE: Michelle Joshua  [address] [facility] [address]     Dear Colleague,    Thank you for referring your patient, Michelle Joshua, to the [facility] NO[facility] AND [facility] [place] [facility]. Please see a copy of my visit note below.      Otolaryngology Clinic  June 12, 2025    Chief Complaint:   Bilateral otalgia       History of Present Illness:   Michelle Joshua is a 34 year old female who presents today for evaluation of bilateral otalgia and recurrent ear infections. Patient reports history of bilateral otalgia and ear infections that have been persistent and very bothersome over the past few months.  Patient reports that they have been treated with antibiotics without relief.  Reports severe ear pain bilaterally.  Also reports decreased hearing bilaterally.  History of ear infections and tubes as a child.  Patient also has history of persistent and bothersome headaches.    Past Medical History:  Past Medical History:   Diagnosis Date     Mitral valve stenosis and aortic valve insufficiency      Uncomplicated asthma      Unspecified otitis media     Otitis Media as a child       Past Surgical History:  Past Surgical History:   Procedure Laterality Date     HC REMOVE TONSILS/ADENOIDS,<10 Y/O      T & A <11y.o.     ZZHC CREATE EARDRUM OPENING,GEN ANESTH      P.E. Tubes       Medications:  Current Outpatient Medications   Medication Sig Dispense Refill     Adalimumab 40 MG/0.4ML AJKT Inject 40 mg subcutaneously every 14 days. 0.8 each 5     albuterol (PROVENTIL) (2.5 MG/3ML) 0.083% neb solution Take 1 vial (2.5 mg) by nebulization every 6 hours as needed for shortness of breath, wheezing or cough. 90 mL 0     atorvastatin (LIPITOR) 20 MG tablet Take 1 tablet (20 mg) by mouth daily. 90 tablet 1     budesonide-formoterol (SYMBICORT/BREYNA) 160-4.5 MCG/ACT Inhaler Inhale 2 puffs into the lungs 2 times daily. 10.2 g 11      EPINEPHrine (ANY BX GENERIC EQUIV) 0.3 MG/0.3ML injection 2-pack Inject 0.3 mLs (0.3 mg) into the muscle as needed for anaphylaxis. May repeat one time in 5-15 minutes if response to initial dose is inadequate. 2 each 11     fluticasone (FLONASE) 50 MCG/ACT nasal spray SHAKE LIQUID AND USE 2 SPRAYS IN EACH NOSTRIL DAILY AS NEEDED FOR RHINITIS OR ALLERGIES 16 g 1     fluticasone (FLOVENT HFA) 220 MCG/ACT inhaler INHALE 1 PUFF INTO THE LUNGS TWICE DAILY 12 g 0     folic acid (FOLVITE) 1 MG tablet Take 1 tablet (1 mg) by mouth daily. 90 tablet 3     loratadine (CLARITIN) 10 MG tablet Take 1 tablet (10 mg) by mouth daily 90 tablet 3     losartan (COZAAR) 100 MG tablet Take 1 tablet (100 mg) by mouth daily. 90 tablet 1     methotrexate 2.5 MG tablet Take 10 tablets (25 mg) by mouth every 7 days. 40 tablet 5     montelukast (SINGULAIR) 10 MG tablet Take 1 tablet (10 mg) by mouth at bedtime. 90 tablet 6     multivitamin (ONE-DAILY) tablet Take 1 tablet by mouth daily. 30 tablet 5     nicotine polacrilex (NICORETTE) 4 MG gum 4 mg, buccal, every hour as needed for nicotine withdrawal symptoms. Chew until tingling, then place between cheek and gum. Repeat. Do not swallow. Not to exceed 96 mg (24 pieces) in a 24 hour period 160 each 3     sodium chloride (OCEAN) 0.65 % nasal spray 2 sprays, both sides of the nose twice a day 30 mL 3     VENTOLIN  (90 Base) MCG/ACT inhaler INHALE 2 PUFFS INTO THE LUNGS EVERY 6 HOURS AS NEEDED FOR SHORTNESS OF BREATH OR WHEEZING OR COUGH 18 g 0     azelastine (ASTELIN) 0.1 % nasal spray Spray 1 spray into both nostrils 2 times daily. 30 mL 0     predniSONE (DELTASONE) 5 MG tablet Take 1 tablet (5 mg) by mouth daily. 30 tablet 0       Allergies:  Allergies   Allergen Reactions     Acetaminophen      Amoxicillin Hives     Amoxicillin-Pot Clavulanate Hives     Bees      Pcn [Penicillins]      Sulfa Antibiotics Hives     Sulfasalazine         Social History:  Social History     Tobacco Use  "    Smoking status: Every Day     Current packs/day: 0.50     Types: Cigarettes     Passive exposure: Never     Smokeless tobacco: Never   Vaping Use     Vaping status: Former   Substance Use Topics     Alcohol use: Yes     Comment: occaionally.     Drug use: No       ROS: 10 point ROS neg other than the symptoms noted above in the HPI.    Physical Exam:    BP (!) 152/78   Pulse 102   Temp 97  F (36.1  C)   Ht 1.549 m (5' 1\")   Wt 64.9 kg (143 lb)   SpO2 99%   BMI 27.02 kg/m       Constitutional:  The patient was unaccompanied, well-groomed, and in no acute distress.     Skin: Normal:  warm and pink without rash    Neurologic: Alert and oriented x 3.  CN's III-XII within normal limits.  Voice normal.    Psychiatric: The patient's affect was calm, cooperative, and appropriate.     Communication:  Normal; communicates verbally, normal voice quality.    Respiratory: Breathing comfortably without stridor or exertion of accessory muscles.    Ears: Pinnae and tragus non-tender.  EAC's and TM's were clear.     Neck: Significant tenderness and tension of bilateral temporomandibular joints and SCMs.      Otologic microscope exam:    Right ear was examined under the microscope.  Normal appearing TM, nicely aerated middle ear space.     Left ear was also examined under the microscope.  Normal appearing TM, nicely aerated middle ear space.       Audiogram: 6/12/2025 - data independently reviewed  Normal hearing bilaterally  % at 50 dB bilaterally  Tympanograms: type A bilaterally    Assessment and Plan:  1. Otalgia, bilateral (Primary)  Patient with history of significant bilateral otalgia and recurrent ear infections.  Today, patient has significant ear pain consistent with previous symptoms that have been diagnosed as an ear infection.  On exam today, there is no evidence of infection, effusion or retraction.  Hearing is normal and tympanograms are also normal.  Patient does have significant tenderness with " palpation of the temporomandibular joints and of the sternocleidomastoid muscles bilaterally.  Suspect that patient's ear pain may be due to this inflammation and tension of the joints and muscles.  Patient also has significant headaches which may also be contributing to tension.    Reassured patient that hearing is normal and there is no evidence of infection on today's exam.  There is no indication for need of PE tubes or any intervention for their ears.  Recommend that patient continue to follow with neurology for headache management.  Patient may also benefit from an appointment with their dentist for TMJ evaluation.    Patient can follow-up in clinic as needed for any new otologic symptoms.    Jemima Vargas DNP, APRN, CNP  Otolaryngology  Head & Neck Surgery  [phone]    30 minutes spent by me on the date of the encounter doing chart review, history and exam, documentation and further activities per the note      Again, thank you for allowing me to participate in the care of your patient.      Sincerely,    Judy Vargas, NP

## 2025-06-13 ENCOUNTER — RESULTS FOLLOW-UP (OUTPATIENT)
Dept: NEUROLOGY | Facility: CLINIC | Age: 35
End: 2025-06-13

## 2025-06-17 ENCOUNTER — VIRTUAL VISIT (OUTPATIENT)
Dept: FAMILY MEDICINE | Facility: CLINIC | Age: 35
End: 2025-06-17
Payer: COMMERCIAL

## 2025-06-17 DIAGNOSIS — M31.4 TAKAYASU'S ARTERITIS (H): ICD-10-CM

## 2025-06-17 DIAGNOSIS — H92.09 OTALGIA, UNSPECIFIED LATERALITY: ICD-10-CM

## 2025-06-17 DIAGNOSIS — R51.9 DAILY HEADACHE: Primary | ICD-10-CM

## 2025-06-17 PROCEDURE — 98006 SYNCH AUDIO-VIDEO EST MOD 30: CPT | Performed by: PHYSICIAN ASSISTANT

## 2025-06-17 NOTE — PROGRESS NOTES
"Michelle is a 34 year old who is being evaluated via a billable video visit.    How would you like to obtain your AVS? MyChart  If the video visit is dropped, the invitation should be resent by: Text to cell phone: [phone]  Will anyone else be joining your video visit? No      Assessment & Plan     Daily headache  Discussed this very well could be migraines.  Due to the complexity of her medical history including heart disease, rheumatological conditions, will reach out to neuro for any medical advise vs helping patient get in with visit    Otalgia, unspecified laterality  As above.  No ear pathology per patient from recent ENT visit    Takayasu's arteritis (H)  Working with cardio, ha follow up later this summer, on cancel list to get in sooner          BMI  Estimated body mass index is 27.02 kg/m  as calculated from the following:    Height as of 6/12/25: 1.549 m (5' 1\").    Weight as of 6/12/25: 64.9 kg (143 lb).             Subjective   Michelle is a 34 year old, presenting for the following health issues:  No chief complaint on file.      6/17/2025    10:57 AM   Additional Questions   Roomed by liz   Accompanied by self     Video Start Time: 11:26 AM    History of Present Illness       Reason for visit:  Multiple issues, will discuss with provider.         Michelle has struggled the last few months with multiple chronic conditions.  She does have trouble with coordinating care and does tend to no show or cancel to visit.  She is looking to get back into cardio secondary to some valve issues seen on recent echo along with Takayasu's.  She has been working with rheumatology, recently restarted adalimumab.  Has not followed in person recent, but has one scheduled.  More recently she has been dealing with chronic eat and head pain.  Been working with ENT and has tried antibiotic for infection, and is currently on prednisone.  Last saw them a couple of days ago.  Note not done, but per patient no sign of " infection, and most likely migraines.  Was told to reach out to neurology who she has been working with.  No follow up planned, was looking to get EMG.            Review of Systems  Constitutional, HEENT, cardiovascular, pulmonary, gi and gu systems are negative, except as otherwise noted.      Objective           Vitals:  No vitals were obtained today due to virtual visit.    Physical Exam   No exam done          Video-Visit Details    Type of service:  Video Visit   Video End Time:11:54 AM  Originating Location (pt. Location): Home    Distant Location (provider location):  Off-site  Platform used for Video Visit: Other: started video, finished telephone  Signed Electronically by: Bimal Pearl PA-C

## 2025-06-17 NOTE — Clinical Note
Dr. Aguiar,  I had a call with Michelle this am, and there is concern her daily headache is migraine.  She has been working with ENT and at her last visit, 6/12, she was told that ears and sinus look fine and that this is probably migraine.  Progress not done yet to confirm.  She does not have follow up scheduled with you yet, so my question is:  Should I help her get scheduled with you to discuss, or do you feel comfortable given her medical history in starting medication to try and help.  Thanks so much  Emmanuel Pearl PA-C

## 2025-06-23 NOTE — PROGRESS NOTES
Otolaryngology Clinic  June 12, 2025    Chief Complaint:   Bilateral otalgia       History of Present Illness:   Michelle Joshua is a 34 year old female who presents today for evaluation of bilateral otalgia and recurrent ear infections. Patient reports history of bilateral otalgia and ear infections that have been persistent and very bothersome over the past few months.  Patient reports that they have been treated with antibiotics without relief.  Reports severe ear pain bilaterally.  Also reports decreased hearing bilaterally.  History of ear infections and tubes as a child.  Patient also has history of persistent and bothersome headaches.    Past Medical History:  Past Medical History:   Diagnosis Date    Mitral valve stenosis and aortic valve insufficiency     Uncomplicated asthma     Unspecified otitis media     Otitis Media as a child       Past Surgical History:  Past Surgical History:   Procedure Laterality Date    HC REMOVE TONSILS/ADENOIDS,<12 Y/O      T & A <11y.o.    ZZHC CREATE EARDRUM OPENING,GEN ANESTH      P.E. Tubes       Medications:  Current Outpatient Medications   Medication Sig Dispense Refill    Adalimumab 40 MG/0.4ML AJKT Inject 40 mg subcutaneously every 14 days. 0.8 each 5    albuterol (PROVENTIL) (2.5 MG/3ML) 0.083% neb solution Take 1 vial (2.5 mg) by nebulization every 6 hours as needed for shortness of breath, wheezing or cough. 90 mL 0    atorvastatin (LIPITOR) 20 MG tablet Take 1 tablet (20 mg) by mouth daily. 90 tablet 1    budesonide-formoterol (SYMBICORT/BREYNA) 160-4.5 MCG/ACT Inhaler Inhale 2 puffs into the lungs 2 times daily. 10.2 g 11    EPINEPHrine (ANY BX GENERIC EQUIV) 0.3 MG/0.3ML injection 2-pack Inject 0.3 mLs (0.3 mg) into the muscle as needed for anaphylaxis. May repeat one time in 5-15 minutes if response to initial dose is inadequate. 2 each 11    fluticasone (FLONASE) 50 MCG/ACT nasal spray SHAKE LIQUID AND USE 2 SPRAYS IN EACH NOSTRIL DAILY AS NEEDED FOR  RHINITIS OR ALLERGIES 16 g 1    fluticasone (FLOVENT HFA) 220 MCG/ACT inhaler INHALE 1 PUFF INTO THE LUNGS TWICE DAILY 12 g 0    folic acid (FOLVITE) 1 MG tablet Take 1 tablet (1 mg) by mouth daily. 90 tablet 3    loratadine (CLARITIN) 10 MG tablet Take 1 tablet (10 mg) by mouth daily 90 tablet 3    losartan (COZAAR) 100 MG tablet Take 1 tablet (100 mg) by mouth daily. 90 tablet 1    methotrexate 2.5 MG tablet Take 10 tablets (25 mg) by mouth every 7 days. 40 tablet 5    montelukast (SINGULAIR) 10 MG tablet Take 1 tablet (10 mg) by mouth at bedtime. 90 tablet 6    multivitamin (ONE-DAILY) tablet Take 1 tablet by mouth daily. 30 tablet 5    nicotine polacrilex (NICORETTE) 4 MG gum 4 mg, buccal, every hour as needed for nicotine withdrawal symptoms. Chew until tingling, then place between cheek and gum. Repeat. Do not swallow. Not to exceed 96 mg (24 pieces) in a 24 hour period 160 each 3    sodium chloride (OCEAN) 0.65 % nasal spray 2 sprays, both sides of the nose twice a day 30 mL 3    VENTOLIN  (90 Base) MCG/ACT inhaler INHALE 2 PUFFS INTO THE LUNGS EVERY 6 HOURS AS NEEDED FOR SHORTNESS OF BREATH OR WHEEZING OR COUGH 18 g 0    azelastine (ASTELIN) 0.1 % nasal spray Spray 1 spray into both nostrils 2 times daily. 30 mL 0    predniSONE (DELTASONE) 5 MG tablet Take 1 tablet (5 mg) by mouth daily. 30 tablet 0       Allergies:  Allergies   Allergen Reactions    Acetaminophen     Amoxicillin Hives    Amoxicillin-Pot Clavulanate Hives    Bees     Pcn [Penicillins]     Sulfa Antibiotics Hives    Sulfasalazine         Social History:  Social History     Tobacco Use    Smoking status: Every Day     Current packs/day: 0.50     Types: Cigarettes     Passive exposure: Never    Smokeless tobacco: Never   Vaping Use    Vaping status: Former   Substance Use Topics    Alcohol use: Yes     Comment: occaionally.    Drug use: No       ROS: 10 point ROS neg other than the symptoms noted above in the HPI.    Physical Exam:    BP  "(!) 152/78   Pulse 102   Temp 97  F (36.1  C)   Ht 1.549 m (5' 1\")   Wt 64.9 kg (143 lb)   SpO2 99%   BMI 27.02 kg/m       Constitutional:  The patient was unaccompanied, well-groomed, and in no acute distress.     Skin: Normal:  warm and pink without rash    Neurologic: Alert and oriented x 3.  CN's III-XII within normal limits.  Voice normal.    Psychiatric: The patient's affect was calm, cooperative, and appropriate.     Communication:  Normal; communicates verbally, normal voice quality.    Respiratory: Breathing comfortably without stridor or exertion of accessory muscles.    Ears: Pinnae and tragus non-tender.  EAC's and TM's were clear.     Neck: Significant tenderness and tension of bilateral temporomandibular joints and SCMs.      Otologic microscope exam:    Right ear was examined under the microscope.  Normal appearing TM, nicely aerated middle ear space.     Left ear was also examined under the microscope.  Normal appearing TM, nicely aerated middle ear space.       Audiogram: 6/12/2025 - data independently reviewed  Normal hearing bilaterally  % at 50 dB bilaterally  Tympanograms: type A bilaterally    Assessment and Plan:  1. Otalgia, bilateral (Primary)  Patient with history of significant bilateral otalgia and recurrent ear infections.  Today, patient has significant ear pain consistent with previous symptoms that have been diagnosed as an ear infection.  On exam today, there is no evidence of infection, effusion or retraction.  Hearing is normal and tympanograms are also normal.  Patient does have significant tenderness with palpation of the temporomandibular joints and of the sternocleidomastoid muscles bilaterally.  Suspect that patient's ear pain may be due to this inflammation and tension of the joints and muscles.  Patient also has significant headaches which may also be contributing to tension.    Reassured patient that hearing is normal and there is no evidence of infection on " today's exam.  There is no indication for need of PE tubes or any intervention for their ears.  Recommend that patient continue to follow with neurology for headache management.  Patient may also benefit from an appointment with their dentist for TMJ evaluation.    Patient can follow-up in clinic as needed for any new otologic symptoms.    Jemima Vargas DNP, APRN, CNP  Otolaryngology  Head & Neck Surgery  [phone]    30 minutes spent by me on the date of the encounter doing chart review, history and exam, documentation and further activities per the note

## 2025-06-24 ENCOUNTER — VIRTUAL VISIT (OUTPATIENT)
Dept: RHEUMATOLOGY | Facility: CLINIC | Age: 35
End: 2025-06-24
Attending: STUDENT IN AN ORGANIZED HEALTH CARE EDUCATION/TRAINING PROGRAM
Payer: COMMERCIAL

## 2025-06-24 ENCOUNTER — TELEPHONE (OUTPATIENT)
Dept: RHEUMATOLOGY | Facility: CLINIC | Age: 35
End: 2025-06-24

## 2025-06-24 ENCOUNTER — TELEPHONE (OUTPATIENT)
Dept: NEUROLOGY | Facility: CLINIC | Age: 35
End: 2025-06-24
Payer: COMMERCIAL

## 2025-06-24 VITALS — BODY MASS INDEX: 27.38 KG/M2 | WEIGHT: 145 LBS | HEIGHT: 61 IN

## 2025-06-24 DIAGNOSIS — I77.6 AORTITIS: ICD-10-CM

## 2025-06-24 DIAGNOSIS — I35.1 AORTIC VALVE INSUFFICIENCY, ETIOLOGY OF CARDIAC VALVE DISEASE UNSPECIFIED: ICD-10-CM

## 2025-06-24 DIAGNOSIS — I34.0 MITRAL VALVE INSUFFICIENCY, UNSPECIFIED ETIOLOGY: ICD-10-CM

## 2025-06-24 DIAGNOSIS — Z91.030 H/O BEE STING ALLERGY: ICD-10-CM

## 2025-06-24 DIAGNOSIS — M31.8 SYSTEMIC VASCULITIS (H): ICD-10-CM

## 2025-06-24 DIAGNOSIS — Z79.899 HIGH RISK MEDICATION USE: ICD-10-CM

## 2025-06-24 DIAGNOSIS — M31.4 TAKAYASU'S ARTERITIS (H): Primary | ICD-10-CM

## 2025-06-24 RX ORDER — EPINEPHRINE 0.3 MG/.3ML
0.3 INJECTION SUBCUTANEOUS PRN
Qty: 2 EACH | Refills: 11 | Status: CANCELLED | OUTPATIENT
Start: 2025-06-24

## 2025-06-24 RX ORDER — FOLIC ACID 1 MG/1
1 TABLET ORAL DAILY
Qty: 90 TABLET | Refills: 3 | Status: SHIPPED | OUTPATIENT
Start: 2025-06-24

## 2025-06-24 RX ORDER — METHOTREXATE 2.5 MG/1
25 TABLET ORAL
Qty: 40 TABLET | Refills: 5 | Status: SHIPPED | OUTPATIENT
Start: 2025-06-24

## 2025-06-24 ASSESSMENT — PAIN SCALES - GENERAL: PAINLEVEL_OUTOF10: SEVERE PAIN (8)

## 2025-06-24 NOTE — PATIENT INSTRUCTIONS
Our plan for today is:    Follow up with primary care provider for Blood pressure control.     Cardiology consultation    Neurology and Ophthalmology follow up    - Tests:    - Labs when possible     - Repeat CT scans with next follow up appointment     - Medications:    Continue Methotrexate 25 mg weekly (Sundays)    Continue Folic acid 1 mg daily     Continue Humira injection every 2 weeks

## 2025-06-24 NOTE — NURSING NOTE
Current patient location: [address] [facility] [address]    Is the patient currently in the state of [place]? YES    Visit mode: VIDEO    If the visit is dropped, the patient can be reconnected by:VIDEO VISIT: Text to cell phone:   Telephone Information:   Mobile [phone]       Will anyone else be joining the visit? NO  (If patient encounters technical issues they should call [phone]    Are changes needed to the allergy or medication list? No    Patient denies any changes since echeck-in completion and states all information entered during echeck-in remains accurate.    Are refills needed on medications prescribed by this physician? Discuss with provider, Epi pen and all medications provider can refill per pt    Rooming Documentation:  Not applicable    No other vitals to report today    Reason for visit: RECHAILYN Capellan MA VVF

## 2025-06-24 NOTE — PROGRESS NOTES
Virtual Visit Details    Type of service:  Video Visit     Joined the call at 6/24/2025, 9:31:06 am.  Left the call at 6/24/2025, 10:13:45 am.  You were on the call for 42 minutes 38 seconds.    Originating Location (pt. Location): Home    Distant Location (provider location):  On-site  Platform used for Video Visit: [facility]    RHEUMATOLOGY OUTPATIENT CLINIC NOTE     Referring Provider:   Bimal Pearl PA-C  [address] [facility] [address]  [place] [address]     Lab review      RF:  Positive, 20 (normal less than 12) 2022  CCP Ab:  Negative       TITA: Positive, 1: 160, homogenous  dsDNA: negative      Complement C3: normal  Complement C4: normal     ANCA by IFA: Negative   MPO: Negative  FL-3 Abs: Negative    Serum IgG4: Normal    Imaging review     Echo heart 2/2025:  Left ventricular function is decreased. The ejection fraction is 50-55% (borderline).  Global right ventricular function is normal.  Moderate mitral insufficiency is present.  Moderate aortic insufficiency is present.  Aortic root and ascending dilatation is present. Sinuses of Valsalva 3.8 cm (index 2.2). Ascending aorta 4.6 cm (indexed at 2.8cm/m2).  IVC diameter <2.1 cm collapsing >50% with sniff suggests a normal RA pressure of 3 mmHg.  No pericardial effusion is present.     CTA chest, abdomen, pelvis with contrast, 10/2024:  1. Non specific bilateral common iliac artery wall thickening, possible sequelae of arteritis. No adjacent fat stranding to suggest active inflammation. No stenosis, beading, or dissection. 2. Aortitis not suggested.  3. Enlarged aortic measurements:  A. Sinuses of Valsalva: 42 mm  B. Ascending thoracic aorta: 49 mm  C. Below superior mesenteric artery origin: 26 mm  4. Sub 6 mm pulmonary nodules. Per Fleischner Society recommendations,if the patient is at low risk for lung cancer, no follow up is needed. If the patient is at high risk for lung cancer, optional chest CT in 12 months could be performed for  reevaluation.  5. Ascending thoracic aortic aorta larger. 49 mm, previously 43 mm in 2022. Infrarenal aortic wall thickening measured 2.9 mm in thickness in 2022, less than 1 mm on 10/14/2024. Bilateral common iliac artery wall thickening is new from 2022.      CTA head and neck with contrast, 11/2024:  1. Head CTA demonstrates no aneurysm or stenosis of the major intracranial arteries.   2. Neck CTA demonstrates no stenosis of the major cervical arteries      X-ray hands, 11/2014:   No erosive disease     X-ray feet, 11/2014:   No erosive change     CTA chest, abdomen, pelvis with contrast, 3/2023:  1. Thickening of the suprarenal and proximal infrarenal aortic wall and periaortic thickening/straning in the distal abdominal aorta consistent with aortitis.  2. Focal irregular enlargement of the ascending aorta and descending thoracic aorta. Maximum ascending aorta diameters are 43 x 42 mm with area/height ratio of 9.3 cm2/m. Maximum descending thoracic aorta diameters are 36 x 32 mm.  3. Moderate stenosis in the proximal inferior mesenteric artery.  4. Patent iliac and common femoral arteries.  5. Compared to prior study on 12/16/22: Maximum ascending aorta diameters have increased. Distal periaortic wall thickening and soft tissue stranding appears more prominent.     CT chest, abdomen, pelvis with contrast, 3/2022:   1. Prominent aorta thickening identified, noted of the distal ascending aorta throughout the aortic arch, descending thoracic aorta, and ending at the level of the renal arteries c/w aortitis.   A. The maximal thickening is at the level or just below the level of the diaphragm at 8-9 mm.   B. There is aneurysmal dilatation in the mid descending thoracic aorta up to 3.7 cm.   C. There is irregular aortitis-type change extending into the base of the brachiocephalic and left subclavian vessels.   D. No other branch vessels were definitely involved with aortitis changes. In-flow vessels appear normal in  thickness and renal arteries are widely patent.     CT abdomen pelvis with contrast, 1/2017:  No acute changes in the abdomen or pelvis to account for patient's symptoms. No bowel obstruction or diverticulitis. Appendix is normal.       Subjective     Visit date June 24, 2025    Michelle is a 34 year old female who presents today for follow up.    Since the last visit,    - Workup  3/6/2025 showed:    Hemoglobin within normal limits   WBC  within normal limits   Platelets  within normal limits   ESR  elevated  CRP  within normal limits   AST  within normal limits   Creatinine  within normal limits     She was evaluated by ENT, there was no concern for ear infection    She was evaluated by neurology and plan for EMG however she was a no-show    She was able to resume both methotrexate and humira however stopped both in April and May due to suspected ear infection, she was evaluated by ENT in mid June and was not felt to have ear infection. She restarted both medications in early June.    Today, Michelle mentioned the following:    She is presenting to video visit however her camera was not working    She is using saline spray and is helping her nose and ear fullness, however she remains with ear fullness and ear pain, she missed ENT follow up with Dr. Perez     She is using the inhalers however remains with shortness of breath on exertion.     She missed cardiology appointment in March and May 2025    She missed Opthalmology consult    She denies fevers, unintentional weight loss     She is now back on both methotrexate and humira     She does not get cramps on consistent basis in her legs with activities     ROS    Current Medications   Methotrexate 25 mg weekly (Sundays)  Folic acid 1 mg daily   Humira injection every 2 weeks    Past Medical history   Past Medical History:   Diagnosis Date    Mitral valve stenosis and aortic valve insufficiency     Uncomplicated asthma     Unspecified otitis media     Otitis Media as  "a child       Objective   Ht 1.549 m (5' 1\")   Wt 65.8 kg (145 lb)   LMP 05/18/2025 (Approximate)   BMI 27.40 kg/m        PHYSICAL EXAMINATION  Physical Exam  Camera was not working   Able to speak full sentence        Assessment & Plan     # Large vessel vasculitis  # Aortitis  # Radiographic thickening aorta [ascending, descending thoracic, abdominal]  # Radiographic mild ectasia left subclavian  # Radiographic thickening proximal inferior mesenteric  # Radiographic thickening of bilateral iliac arteries    # Exertional shortness of breath, history of asthma    # Recurrent ear infections  # Recurrent eye redness with photophobia  # Lower extremity tingling and numbness  # Negative ANCA serology, normal IgG4 level    # Family history of autoimmunity  # Chronic immunosuppression  # Screening for chronic infections  # High risk medication requiring frequent lab tests for toxicity monitoring  # Longitudinal care      Michelle is following with 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  Onset: 2022  Involvement: Chest pain, large vessel vasculitis involving aortic arch, descending thoracic aorta, abdominal aorta, left subclavian, proximal inferior mesenteric.  Valvular heart disease.  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      She continues to have episodes of ear pain that was initially presumed to be infection however after ENT evaluation infection is ruled out and she was able to resume methotrexate and Humira.  " She remains with exertional shortness of breath, headache there is no systemic symptoms currently.  Her blood pressure is uncontrolled and she does not check her blood pressure regularly at home.    We had an extensive discussion today about the importance of clinic appointments and follow-ups, Due to personal circumstances, she missed previous appointments.  She mentioned that she is currently on track and motivated to complete her evaluations including neurology, cardiology, ENT and ophthalmology.    We discussed continuing methotrexate and adalimumab at the current dose and get updated labs soon then she will need repeat imaging in few months to compare with previous CT scans last year to evaluate for radiographic monitoring of the disease    Plan:  - Continue methotrexate 25 mg weekly  - Continue folic acid 1 mg daily  - Continue adalimumab subcutaneous injection every 2 weeks  - Get updated labs   - CTA head and neck, CTA chest, abdomen vessel distal runoff in 3 months.      # Exertional shortness of breath, history of asthma  ANCA Negative.  IgE levels elevated, likely from concomitant asthma, less likely from IgG4   No reported parenchymal lung disease on CT imaging  Evaluated by pulmonary, referred to allergy  Pending Cardiology and cardiac MRI    # Recurrent ear infections  Evaluated by Rhinology ENT, there might be plan for sinus biopsy however missed follow up  Evaluated by Otology ENT, there was no concern for ear infection.      # Recurrent eye redness with photophobia  Ophthalmology referral to rule out uveitis.   Missed several appointments      # Lower extremity tingling and numbness  Neurology consultation completed, recommended EMG however missed follow up     # Chronic immunosuppression  # Screening for chronic infections  2024  Hepatitis B surface antigen: Negative  Hepatitis B core: Negative  Hepatitis B surface antibody: Negative   Hepatitis C antibody: Negative   QuantiFERON gold: Negative      # High risk medication requiring frequent lab tests for toxicity monitoring  History of methotrexate and Humira use    Labs reviewed on 3/6/2025, CBC,AST, Creatinine no signs of concerning toxicity   Standing orders in place      Plan:   MT pharmacy follow-up  Standing orders for monitoring        # Longitudinal care  The longitudinal plan of care for the diagnosis(es)/condition(s) as documented were addressed during this visit. Due to the added complexity in care, I will continue to support Michelle in the subsequent management and with ongoing continuity of care.     Review of the result(s) of each unique test - as HPI   Ordering of each unique test  Prescription drug management    53 minutes spent by me on the date of the encounter doing chart review, history and exam, documentation and further activities per the note      Return in about 4 months (around 10/24/2025) for Follow up.    René Stock MD  [facility] RHEUMATOLOGY

## 2025-06-24 NOTE — TELEPHONE ENCOUNTER
[facility] Call Center    Phone Message    May a detailed message be left on voicemail: yes     Reason for Call: Patient returned call and scheduled EMG for 07/17/2025. Patient is wanting Dr Aguiar to reach out to their PCP (Bimal Pearl PA-C) at [phone] to discuss a few things before they come in for the EMG. Please review and advise as needed.     Action Taken: Message routed to:  [facility] ([facility]): Neurology

## 2025-06-24 NOTE — LETTER
6/24/2025       RE: Michelle Joshua  [address]  [facility] [address]     Dear Colleague,    Thank you for referring your patient, Michelle Joshua, to the [facility] RHEUMATOLOGY at [facility]. Please see a copy of my visit note below.    Virtual Visit Details    Type of service:  Video Visit     Joined the call at 6/24/2025, 9:31:06 am.  Left the call at 6/24/2025, 10:13:45 am.  You were on the call for 42 minutes 38 seconds.    Originating Location (pt. Location): Home    Distant Location (provider location):  On-site  Platform used for Video Visit: [facility]    RHEUMATOLOGY OUTPATIENT CLINIC NOTE     Referring Provider:   Bimal Pearl PA-C  [address] [facility] [address]  [place] [address]     Lab review      RF:  Positive, 20 (normal less than 12) 2022  CCP Ab:  Negative       TITA: Positive, 1: 160, homogenous  dsDNA: negative      Complement C3: normal  Complement C4: normal     ANCA by IFA: Negative   MPO: Negative  OH-3 Abs: Negative    Serum IgG4: Normal    Imaging review     Echo heart 2/2025:  Left ventricular function is decreased. The ejection fraction is 50-55% (borderline).  Global right ventricular function is normal.  Moderate mitral insufficiency is present.  Moderate aortic insufficiency is present.  Aortic root and ascending dilatation is present. Sinuses of Valsalva 3.8 cm (index 2.2). Ascending aorta 4.6 cm (indexed at 2.8cm/m2).  IVC diameter <2.1 cm collapsing >50% with sniff suggests a normal RA pressure of 3 mmHg.  No pericardial effusion is present.     CTA chest, abdomen, pelvis with contrast, 10/2024:  1. Non specific bilateral common iliac artery wall thickening, possible sequelae of arteritis. No adjacent fat stranding to suggest active inflammation. No stenosis, beading, or dissection. 2. Aortitis not suggested.  3. Enlarged aortic measurements:  A. Sinuses of Valsalva: 42 mm  B. Ascending thoracic aorta: 49  mm  C. Below superior mesenteric artery origin: 26 mm  4. Sub 6 mm pulmonary nodules. Per Fleischner Society recommendations,if the patient is at low risk for lung cancer, no follow up is needed. If the patient is at high risk for lung cancer, optional chest CT in 12 months could be performed for reevaluation.  5. Ascending thoracic aortic aorta larger. 49 mm, previously 43 mm in 2022. Infrarenal aortic wall thickening measured 2.9 mm in thickness in 2022, less than 1 mm on 10/14/2024. Bilateral common iliac artery wall thickening is new from 2022.      CTA head and neck with contrast, 11/2024:  1. Head CTA demonstrates no aneurysm or stenosis of the major intracranial arteries.   2. Neck CTA demonstrates no stenosis of the major cervical arteries      X-ray hands, 11/2014:   No erosive disease     X-ray feet, 11/2014:   No erosive change     CTA chest, abdomen, pelvis with contrast, 3/2023:  1. Thickening of the suprarenal and proximal infrarenal aortic wall and periaortic thickening/straning in the distal abdominal aorta consistent with aortitis.  2. Focal irregular enlargement of the ascending aorta and descending thoracic aorta. Maximum ascending aorta diameters are 43 x 42 mm with area/height ratio of 9.3 cm2/m. Maximum descending thoracic aorta diameters are 36 x 32 mm.  3. Moderate stenosis in the proximal inferior mesenteric artery.  4. Patent iliac and common femoral arteries.  5. Compared to prior study on 12/16/22: Maximum ascending aorta diameters have increased. Distal periaortic wall thickening and soft tissue stranding appears more prominent.     CT chest, abdomen, pelvis with contrast, 3/2022:   1. Prominent aorta thickening identified, noted of the distal ascending aorta throughout the aortic arch, descending thoracic aorta, and ending at the level of the renal arteries c/w aortitis.   A. The maximal thickening is at the level or just below the level of the diaphragm at 8-9 mm.   B. There is  aneurysmal dilatation in the mid descending thoracic aorta up to 3.7 cm.   C. There is irregular aortitis-type change extending into the base of the brachiocephalic and left subclavian vessels.   D. No other branch vessels were definitely involved with aortitis changes. In-flow vessels appear normal in thickness and renal arteries are widely patent.     CT abdomen pelvis with contrast, 1/2017:  No acute changes in the abdomen or pelvis to account for patient's symptoms. No bowel obstruction or diverticulitis. Appendix is normal.       Subjective    Visit date June 24, 2025    Michelle is a 34 year old female who presents today for follow up.    Since the last visit,    - Workup  3/6/2025 showed:    Hemoglobin within normal limits   WBC  within normal limits   Platelets  within normal limits   ESR  elevated  CRP  within normal limits   AST  within normal limits   Creatinine  within normal limits     She was evaluated by ENT, there was no concern for ear infection    She was evaluated by neurology and plan for EMG however she was a no-show    She was able to resume both methotrexate and humira however stopped both in April and May due to suspected ear infection, she was evaluated by ENT in mid June and was not felt to have ear infection. She restarted both medications in early June.    Today, Michelle mentioned the following:    She is presenting to video visit however her camera was not working    She is using saline spray and is helping her nose and ear fullness, however she remains with ear fullness and ear pain, she missed ENT follow up with Dr. Perez     She is using the inhalers however remains with shortness of breath on exertion.     She missed cardiology appointment in March and May 2025    She missed Opthalmology consult    She denies fevers, unintentional weight loss     She is now back on both methotrexate and humira     She does not get cramps on consistent basis in her legs with activities  "    ROS    Current Medications   Methotrexate 25 mg weekly (Sundays)  Folic acid 1 mg daily   Humira injection every 2 weeks    Past Medical history   Past Medical History:   Diagnosis Date     Mitral valve stenosis and aortic valve insufficiency      Uncomplicated asthma      Unspecified otitis media     Otitis Media as a child       Objective  Ht 1.549 m (5' 1\")   Wt 65.8 kg (145 lb)   LMP 05/18/2025 (Approximate)   BMI 27.40 kg/m        PHYSICAL EXAMINATION  Physical Exam  Camera was not working   Able to speak full sentence        Assessment & Plan    # Large vessel vasculitis  # Aortitis  # Radiographic thickening aorta [ascending, descending thoracic, abdominal]  # Radiographic mild ectasia left subclavian  # Radiographic thickening proximal inferior mesenteric  # Radiographic thickening of bilateral iliac arteries    # Exertional shortness of breath, history of asthma    # Recurrent ear infections  # Recurrent eye redness with photophobia  # Lower extremity tingling and numbness  # Negative ANCA serology, normal IgG4 level    # Family history of autoimmunity  # Chronic immunosuppression  # Screening for chronic infections  # High risk medication requiring frequent lab tests for toxicity monitoring  # Longitudinal care      Michelle is following with 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  Onset: 2022  Involvement: Chest pain, large vessel vasculitis involving aortic arch, descending thoracic aorta, abdominal aorta, left subclavian, proximal inferior mesenteric.  Valvular heart disease.  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      She continues to have episodes of ear pain that was initially presumed to be infection however after ENT evaluation infection is ruled out and she was able to resume methotrexate and Humira.  She remains with exertional shortness of breath, headache there is no systemic symptoms currently.  Her blood pressure is uncontrolled and she does not check her blood pressure regularly at home.    We had an extensive discussion today about the importance of clinic appointments and follow-ups, Due to personal circumstances, she missed previous appointments.  She mentioned that she is currently on track and motivated to complete her evaluations including neurology, cardiology, ENT and ophthalmology.    We discussed continuing methotrexate and adalimumab at the current dose and get updated labs soon then she will need repeat imaging in few months to compare with previous CT scans last year to evaluate for radiographic monitoring of the disease    Plan:  - Continue methotrexate 25 mg weekly  - Continue folic acid 1 mg daily  - Continue adalimumab subcutaneous injection every 2 weeks  - Get updated labs   - CTA head and neck, CTA chest, abdomen vessel distal runoff in 3 months.      # Exertional shortness of breath, history of asthma  ANCA Negative.  IgE levels elevated, likely from concomitant asthma, less likely from IgG4   No reported parenchymal lung disease on CT imaging  Evaluated by pulmonary, referred to allergy  Pending Cardiology and cardiac MRI    # Recurrent ear infections  Evaluated by Rhinology ENT, there might be plan for sinus biopsy however missed follow up  Evaluated by Otology ENT, there was no concern for ear infection.      # Recurrent eye redness with photophobia  Ophthalmology referral to rule out uveitis.   Missed several appointments      # Lower extremity tingling and  numbness  Neurology consultation completed, recommended EMG however missed follow up     # Chronic immunosuppression  # Screening for chronic infections  2024  Hepatitis B surface antigen: Negative  Hepatitis B core: Negative  Hepatitis B surface antibody: Negative   Hepatitis C antibody: Negative   QuantiFERON gold: Negative     # High risk medication requiring frequent lab tests for toxicity monitoring  History of methotrexate and Humira use    Labs reviewed on 3/6/2025, CBC,AST, Creatinine no signs of concerning toxicity   Standing orders in place      Plan:   MT pharmacy follow-up  Standing orders for monitoring        # Longitudinal care  The longitudinal plan of care for the diagnosis(es)/condition(s) as documented were addressed during this visit. Due to the added complexity in care, I will continue to support Michelle in the subsequent management and with ongoing continuity of care.     Review of the result(s) of each unique test - as HPI   Ordering of each unique test  Prescription drug management    53 minutes spent by me on the date of the encounter doing chart review, history and exam, documentation and further activities per the note      Return in about 4 months (around 10/24/2025) for Follow up.    René Stock MD  [facility] RHEUMATOLOGY        Again, thank you for allowing me to participate in the care of your patient.      Sincerely,    René Stock MD

## 2025-06-24 NOTE — TELEPHONE ENCOUNTER
Left Voicemail (1st Attempt) and Sent Mychart (1st Attempt) for the patient to call back and schedule the following:    Appointment type: EMG  Provider: any EMG provider  Return date: next available  Specialty phone number: [phone]  Additional appointment(s) needed: NA  Additonal Notes: Please help schedule an EMG per Dr Aguiar that was placed 1/14/25, per Terri Pinedo on 6/24/2025 at 11:52 AM

## 2025-06-24 NOTE — TELEPHONE ENCOUNTER
Called pt and LVM asking her to call back to get scheduled for the following appts:    Follow up with Dr. Stock in early October, preferably in person and labs 1 week prior to that appt

## 2025-06-30 NOTE — TELEPHONE ENCOUNTER
Called pt and LVM asking them to call back. Closing encounter as I have reached out 3x now with no response.    I will try calling again in 1-2 months.

## 2025-07-17 ENCOUNTER — OFFICE VISIT (OUTPATIENT)
Dept: NEUROLOGY | Facility: CLINIC | Age: 35
End: 2025-07-17
Attending: PSYCHIATRY & NEUROLOGY
Payer: COMMERCIAL

## 2025-07-17 DIAGNOSIS — R20.2 PARESTHESIAS: ICD-10-CM

## 2025-07-17 DIAGNOSIS — M31.4 TAKAYASU'S ARTERITIS (H): ICD-10-CM

## 2025-07-17 NOTE — LETTER
2025       RE: Michelle Joshua  [address] [facility] [address]     Dear Colleague,    Thank you for referring your patient, Michelle Joshua, to the [facility] EMG CLIN[facility] [place] [facility]. Please see a copy of my visit note below.                        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  EMG was requested to evaluate for large fiber polyneuropathy, carpal tunnel syndrome, or ulnar neuropathy.    Techniques:    Motor and sensory nerve conduction studies were done with surface recording electrodes. Temperature was monitored and recorded throughout the study. Upper extremities were maintained at a temperature of 32 degrees Centigrade or higher and Lower extremities were maintained at a temperature of 31 degrees Centigrade or higher.      Results    Right fibular (EDB), tibial (AH), median (APB), and ulnar (ADM) motor nerve conduction studies and F-wave latencies were normal.  Right median, ulnar, radial, sural, and superficial fibular antidromic sensory nerve conduction studies were normal.    Needle EMG was deferred in consideration of the unique situation of the patient's presentation of pure sensory symptoms restricted to both feet with normal motor examination and normal motor and sensory nerve conduction studies, with no symptoms or signs of radiculopathy or  plexopathy.     Interpretation:    Normal study.  No electrodiagnostic evidence of large fiber polyneuropathy, right sided carpal tunnel syndrome, or ulnar neuropathy.        ___________________________  Ryland Leonard MD        Nerve Conduction Studies  Motor Sites      Latency Neg. Amp Neg. Amp Diff Segment Distance Velocity Neg. Dur Neg Area Diff Temperature Comment   Site (ms) Norm (mV) Norm (%)  cm m/s Norm (ms) (%) ( C)    Right Fibular (EDB) Motor   Ankle 4.1  < 6.0 8.3 -  Ankle-EDB 8   6.1  30.1    Bel Fibular Head 10.5 - 7.5 - -10 Bel Fibular Head-Ankle 28.5 45  > 38 6.2 -1 30.2    Pop Fossa 11.5 - 7.4 - -1 Pop Fossa-Bel Fibular Head 7.5 75  > 38 6.4 -1 30.2    Right Median (APB) Motor   Wrist 3.7  < 4.4 9.0  > 5.0  Wrist-APB 8   6.0  32.9    Elbow 7.3 - 8.6  > 5.0 -4 Elbow-Wrist 19.2 53  > 48 6.0 1 33.1    Right Tibial (AHB) Motor   Ankle 4.9  < 6.5 13.5  > 5.0  Ankle-AH 6.5   8.7  31    Knee 12.7 - 11.4 - -16 Knee-Ankle 32 41  > 38 9.3 -6 31.2    Right Ulnar (ADM) Motor   Wrist 2.5  < 3.5 11.4  > 5.0  Wrist-ADM 8   5.6  32.9    Below Elbow 5.9 - 10.7 - -6 Below Elbow-Wrist 18.8 55  > 48 5.6 -4 32.9    Above Elbow 7.5 - 10.4 - -3 Above Elbow-Below Elbow 10.7 67  > 48 5.9 1 32.9      F-Wave Sites      Min F-Lat Max-Min F-Lat Mean F-Lat   Site (ms) (ms) (ms)   Right Fibular F-Wave   Ankle 45.8 0.70 -   Right Median F-Wave   Wrist 26.8 0.60 27.1   Right Tibial F-Wave   Ankle 53.0 6.5 -   Right Ulnar F-Wave   Wrist 25.2 - 25.2     Sensory Sites      Onset Lat Peak Lat Amp (O-P) Amp (P-P) Segment Distance Velocity Temperature Comment   Site ms (ms)  V Norm ( V)  cm m/s Norm ( C)    Right Median Sensory   Wrist-Dig II 2.5 3.4 34  > 10 52 Wrist-Dig II 14 56  > 48 32.9    Right Radial Sensory   Forearm-Wrist 1.63 2.1 53  > 15 59 Forearm-Wrist 10 61 - 33    Right Superficial Fibular Sensory   Lower Leg-Ankle 2.6 3.2 47  > 3 50 Lower Leg-Ankle 12.5 48 - 30.6    Right Sural Sensory   Calf-Lat Malleolus 2.9  3.7 35  > 5 42 Calf-Lat Malleolus 14 48  > 38 29.8    Right Ulnar Sensory   Wrist-Dig V 1.95 2.5 21  > 8 33 Wrist-Dig V 12.5 64  > 48 33            Waveforms / Images:    Motor                Sensory                  F-Wave                        Again, thank you for allowing me to participate in the care of your patient.      Sincerely,    Ryland Leonard MD

## 2025-07-17 NOTE — PROGRESS NOTES
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  EMG was requested to evaluate for large fiber polyneuropathy, carpal tunnel syndrome, or ulnar neuropathy.    Techniques:    Motor and sensory nerve conduction studies were done with surface recording electrodes. Temperature was monitored and recorded throughout the study. Upper extremities were maintained at a temperature of 32 degrees Centigrade or higher and Lower extremities were maintained at a temperature of 31 degrees Centigrade or higher.      Results    Right fibular (EDB), tibial (AH), median (APB), and ulnar (ADM) motor nerve conduction studies and F-wave latencies were normal.  Right median, ulnar, radial, sural, and superficial fibular antidromic sensory nerve conduction studies were normal.    Needle EMG was deferred in consideration of the unique situation of the patient's presentation of pure sensory symptoms restricted to both feet with normal motor examination and normal motor and sensory nerve conduction studies, with no symptoms or signs of radiculopathy or plexopathy.     Interpretation:    Normal study.  No electrodiagnostic evidence of large fiber polyneuropathy, right sided carpal tunnel syndrome, or ulnar neuropathy.        ___________________________  Ryland Leonard MD        Nerve Conduction Studies  Motor Sites      Latency Neg. Amp Neg. Amp Diff  Segment Distance Velocity Neg. Dur Neg Area Diff Temperature Comment   Site (ms) Norm (mV) Norm (%)  cm m/s Norm (ms) (%) ( C)    Right Fibular (EDB) Motor   Ankle 4.1  < 6.0 8.3 -  Ankle-EDB 8   6.1  30.1    Bel Fibular Head 10.5 - 7.5 - -10 Bel Fibular Head-Ankle 28.5 45  > 38 6.2 -1 30.2    Pop Fossa 11.5 - 7.4 - -1 Pop Fossa-Bel Fibular Head 7.5 75  > 38 6.4 -1 30.2    Right Median (APB) Motor   Wrist 3.7  < 4.4 9.0  > 5.0  Wrist-APB 8   6.0  32.9    Elbow 7.3 - 8.6  > 5.0 -4 Elbow-Wrist 19.2 53  > 48 6.0 1 33.1    Right Tibial (AHB) Motor   Ankle 4.9  < 6.5 13.5  > 5.0  Ankle-AH 6.5   8.7  31    Knee 12.7 - 11.4 - -16 Knee-Ankle 32 41  > 38 9.3 -6 31.2    Right Ulnar (ADM) Motor   Wrist 2.5  < 3.5 11.4  > 5.0  Wrist-ADM 8   5.6  32.9    Below Elbow 5.9 - 10.7 - -6 Below Elbow-Wrist 18.8 55  > 48 5.6 -4 32.9    Above Elbow 7.5 - 10.4 - -3 Above Elbow-Below Elbow 10.7 67  > 48 5.9 1 32.9      F-Wave Sites      Min F-Lat Max-Min F-Lat Mean F-Lat   Site (ms) (ms) (ms)   Right Fibular F-Wave   Ankle 45.8 0.70 -   Right Median F-Wave   Wrist 26.8 0.60 27.1   Right Tibial F-Wave   Ankle 53.0 6.5 -   Right Ulnar F-Wave   Wrist 25.2 - 25.2     Sensory Sites      Onset Lat Peak Lat Amp (O-P) Amp (P-P) Segment Distance Velocity Temperature Comment   Site ms (ms)  V Norm ( V)  cm m/s Norm ( C)    Right Median Sensory   Wrist-Dig II 2.5 3.4 34  > 10 52 Wrist-Dig II 14 56  > 48 32.9    Right Radial Sensory   Forearm-Wrist 1.63 2.1 53  > 15 59 Forearm-Wrist 10 61 - 33    Right Superficial Fibular Sensory   Lower Leg-Ankle 2.6 3.2 47  > 3 50 Lower Leg-Ankle 12.5 48 - 30.6    Right Sural Sensory   Calf-Lat Malleolus 2.9 3.7 35  > 5 42 Calf-Lat Malleolus 14 48  > 38 29.8    Right Ulnar Sensory   Wrist-Dig V 1.95 2.5 21  > 8 33 Wrist-Dig V 12.5 64  > 48 33            Waveforms / Images:    Motor                Sensory                  F-Wave

## 2025-07-23 ENCOUNTER — APPOINTMENT (OUTPATIENT)
Dept: LAB | Facility: CLINIC | Age: 35
End: 2025-07-23
Attending: INTERNAL MEDICINE
Payer: COMMERCIAL

## 2025-08-16 ENCOUNTER — HEALTH MAINTENANCE LETTER (OUTPATIENT)
Age: 35
End: 2025-08-16

## 2025-08-28 ENCOUNTER — MYC REFILL (OUTPATIENT)
Dept: PULMONOLOGY | Facility: CLINIC | Age: 35
End: 2025-08-28
Payer: COMMERCIAL

## 2025-08-28 ENCOUNTER — MYC REFILL (OUTPATIENT)
Dept: FAMILY MEDICINE | Facility: CLINIC | Age: 35
End: 2025-08-28
Payer: COMMERCIAL

## 2025-08-28 ENCOUNTER — MYC REFILL (OUTPATIENT)
Dept: OTHER | Facility: CLINIC | Age: 35
End: 2025-08-28
Payer: COMMERCIAL

## 2025-08-28 DIAGNOSIS — J32.0 CHRONIC MAXILLARY SINUSITIS: ICD-10-CM

## 2025-08-28 DIAGNOSIS — J45.909 UNCOMPLICATED ASTHMA, UNSPECIFIED ASTHMA SEVERITY, UNSPECIFIED WHETHER PERSISTENT: ICD-10-CM

## 2025-08-28 DIAGNOSIS — I77.6 AORTITIS: ICD-10-CM

## 2025-08-28 RX ORDER — BUDESONIDE AND FORMOTEROL FUMARATE DIHYDRATE 160; 4.5 UG/1; UG/1
2 AEROSOL RESPIRATORY (INHALATION) 2 TIMES DAILY
Qty: 10.2 G | Refills: 2 | Status: SHIPPED | OUTPATIENT
Start: 2025-08-28

## 2025-08-28 RX ORDER — MONTELUKAST SODIUM 10 MG/1
10 TABLET ORAL AT BEDTIME
Qty: 90 TABLET | Refills: 0 | Status: SHIPPED | OUTPATIENT
Start: 2025-08-28

## 2025-08-28 RX ORDER — AZELASTINE 1 MG/ML
1 SPRAY, METERED NASAL 2 TIMES DAILY
Qty: 30 ML | Refills: 0 | Status: SHIPPED | OUTPATIENT
Start: 2025-08-28

## 2025-08-28 RX ORDER — LOSARTAN POTASSIUM 100 MG/1
100 TABLET ORAL DAILY
Qty: 90 TABLET | Refills: 1 | OUTPATIENT
Start: 2025-08-28

## 2025-08-28 RX ORDER — FLUTICASONE PROPIONATE 50 MCG
2 SPRAY, SUSPENSION (ML) NASAL DAILY
Qty: 16 G | Refills: 1 | Status: SHIPPED | OUTPATIENT
Start: 2025-08-28

## 2025-09-02 ENCOUNTER — TELEPHONE (OUTPATIENT)
Dept: PHARMACY | Facility: CLINIC | Age: 35
End: 2025-09-02
Payer: COMMERCIAL

## 2025-09-02 DIAGNOSIS — M31.8 SYSTEMIC VASCULITIS (H): ICD-10-CM

## 2025-09-02 DIAGNOSIS — I77.6 AORTITIS: ICD-10-CM

## 2025-09-02 RX ORDER — METHOTREXATE 2.5 MG/1
25 TABLET ORAL
Qty: 40 TABLET | Refills: 5 | Status: CANCELLED | OUTPATIENT
Start: 2025-09-02

## 2025-09-02 RX ORDER — FOLIC ACID 1 MG/1
1 TABLET ORAL DAILY
Qty: 90 TABLET | Refills: 3 | Status: CANCELLED | OUTPATIENT
Start: 2025-09-02